# Patient Record
Sex: MALE | Race: WHITE | HISPANIC OR LATINO | Employment: UNEMPLOYED | URBAN - METROPOLITAN AREA
[De-identification: names, ages, dates, MRNs, and addresses within clinical notes are randomized per-mention and may not be internally consistent; named-entity substitution may affect disease eponyms.]

---

## 2017-04-25 PROBLEM — Z72.0 TOBACCO ABUSE: Status: ACTIVE | Noted: 2017-04-25

## 2017-04-25 PROBLEM — I25.10 CORONARY ARTERY DISEASE INVOLVING NATIVE HEART: Status: ACTIVE | Noted: 2017-04-25

## 2017-06-02 PROBLEM — R06.09 DOE (DYSPNEA ON EXERTION): Status: ACTIVE | Noted: 2017-06-02

## 2017-06-02 PROBLEM — R97.20 ELEVATED PSA: Status: ACTIVE | Noted: 2017-06-02

## 2017-06-02 PROBLEM — I10 ESSENTIAL HYPERTENSION: Status: ACTIVE | Noted: 2017-06-02

## 2017-07-12 ENCOUNTER — HOSPITAL ENCOUNTER (OUTPATIENT)
Dept: SLEEP MEDICINE | Facility: HOSPITAL | Age: 67
Discharge: HOME | End: 2017-07-12
Attending: NURSE PRACTITIONER
Payer: MEDICARE

## 2017-07-12 DIAGNOSIS — G47.33 OBSTRUCTIVE SLEEP APNEA (ADULT) (PEDIATRIC): ICD-10-CM

## 2017-07-12 PROCEDURE — 95810 POLYSOM 6/> YRS 4/> PARAM: CPT

## 2017-09-19 PROBLEM — Z95.1 HX OF CABG: Status: ACTIVE | Noted: 2017-09-19

## 2017-09-19 PROBLEM — R07.9 ACUTE CHEST PAIN: Status: ACTIVE | Noted: 2017-09-19

## 2017-09-29 PROBLEM — Z91.148 NONCOMPLIANCE WITH MEDICATIONS: Status: ACTIVE | Noted: 2017-09-29

## 2018-10-23 ENCOUNTER — HOSPITAL ENCOUNTER (INPATIENT)
Facility: HOSPITAL | Age: 68
LOS: 12 days | Discharge: HOME HEALTH | DRG: 054 | End: 2018-11-04
Attending: INTERNAL MEDICINE | Admitting: INTERNAL MEDICINE
Payer: MEDICARE

## 2018-10-23 DIAGNOSIS — Z78.9 IMPAIRED MOBILITY AND ACTIVITIES OF DAILY LIVING: ICD-10-CM

## 2018-10-23 DIAGNOSIS — N50.89 TESTICLE SWELLING: ICD-10-CM

## 2018-10-23 DIAGNOSIS — Z75.8 DISCHARGE PLANNING ISSUES: ICD-10-CM

## 2018-10-23 DIAGNOSIS — I10 ESSENTIAL HYPERTENSION: ICD-10-CM

## 2018-10-23 DIAGNOSIS — R07.9 CHEST PAIN: ICD-10-CM

## 2018-10-23 DIAGNOSIS — Z74.09 IMPAIRED MOBILITY AND ACTIVITIES OF DAILY LIVING: ICD-10-CM

## 2018-10-23 DIAGNOSIS — R91.8 LUNG MASS: ICD-10-CM

## 2018-10-23 DIAGNOSIS — C34.90 NON-SMALL CELL CARCINOMA OF LUNG: ICD-10-CM

## 2018-10-23 DIAGNOSIS — C79.31 BRAIN METASTASIS: ICD-10-CM

## 2018-10-23 DIAGNOSIS — R53.81 DEBILITY: Primary | ICD-10-CM

## 2018-10-23 DIAGNOSIS — G93.6 VASOGENIC CEREBRAL EDEMA: ICD-10-CM

## 2018-10-23 DIAGNOSIS — E87.5 HYPERKALEMIA: ICD-10-CM

## 2018-10-23 LAB — POCT GLUCOSE: 141 MG/DL (ref 70–110)

## 2018-10-23 PROCEDURE — 11000001 HC ACUTE MED/SURG PRIVATE ROOM

## 2018-10-23 PROCEDURE — 25000003 PHARM REV CODE 250: Performed by: HOSPITALIST

## 2018-10-23 RX ORDER — HYDRALAZINE HYDROCHLORIDE 25 MG/1
25 TABLET, FILM COATED ORAL EVERY 8 HOURS PRN
Status: DISCONTINUED | OUTPATIENT
Start: 2018-10-23 | End: 2018-11-04 | Stop reason: HOSPADM

## 2018-10-23 RX ORDER — CARVEDILOL 12.5 MG/1
12.5 TABLET ORAL 2 TIMES DAILY
Status: DISCONTINUED | OUTPATIENT
Start: 2018-10-23 | End: 2018-10-26

## 2018-10-23 RX ADMIN — CARVEDILOL 12.5 MG: 12.5 TABLET, FILM COATED ORAL at 10:10

## 2018-10-23 NOTE — LETTER
October 26, 2018                 [facility]  [address]  [place]  [address]  Phone: [phone]  Fax: [phone] October 26, 2018     Maria L Medina            To Whom It May Concern:    Ms. Maria L Medina missed work/school on  10/26/2018 being at bedside as a family member. She may return to school on 10/27/2018 .  If you have any questions or concerns, please don't hesitate to call [facility] office at [phone].      Sincerely,        Deborah Pham MD  Internal Medicine      Department of [facility]

## 2018-10-23 NOTE — PLAN OF CARE
Please call extension [phone] (if nobody answers, this will flip to a beeper, so put in your call back number) upon patient arrival to floor for Hospital Medicine admit team assignment and for additional admit orders for the patient.  Do not page the attending, staff physician associated with the patient on arrival (may not be in-house at the time of arrival).  Rather, always call [phone] to reach the triage physician for orders and team assignment.        Acceptance Note     Patients name:  Lorenzo Rai, MRN: [id number]     Transferring Physician or Mid-Level provider/Clinic giving report:   Dr. Lorenzo Shanks at [facility] ED    Accepting Physician for admission to hospital:   MANDEEP Rogers MD     Date of acceptance:  10/23/18    Review of patient's allergies indicates:  No Known Allergies    Past Medical History:   Diagnosis Date    Hypertension     PSA elevation     Vitamin D insufficiency         Reason for transfer:  Possible lung malignancy with brain mets     Report from Physician/Mid-Level Provider:  Mr. Tran is a 66yo man with a long history of smoking cigars.  He is brought to the ED by his family due to progressive altered mental status.  He was noted to have very faint right facial droop and right leg weakness.  CT head in the ED along with CXR revealed high likelihood of cancer with brain met.  He was given Decadron x 1 at 10mg.  He has had no seizure activity noted and the sending MD feels he is stable for [facility] bed.    VS: Temp:  [97.4 °F (36.3 °C)] 97.4 °F (36.3 °C)  Pulse:  [88-98] 88  Resp:  [16] 16  SpO2:  [100 %] 100 %  BP: (165-171)/(76-88) 165/76    Labs:  Recent Results (from the past 24 hour(s))   CBC auto differential    Collection Time: 10/23/18  2:51 PM   Result Value Ref Range    WBC 11.67 3.90 - 12.70 K/uL    RBC 4.66 4.60 - 6.20 M/uL    Hemoglobin 12.6 (L) 14.0 - 18.0 g/dL    Hematocrit 40.8 40.0 - 54.0 %    MCV 88 82 - 98 fL    MCH 27.0 27.0 - 31.0 pg    MCHC 30.9 (L)  32.0 - 36.0 g/dL    RDW 13.6 11.5 - 14.5 %    Platelets 236 150 - 350 K/uL    MPV 10.3 9.2 - 12.9 fL    Gran # (ANC) 8.8 (H) 1.8 - 7.7 K/uL    Lymph # 1.0 1.0 - 4.8 K/uL    Mono # 1.0 0.3 - 1.0 K/uL    Eos # 0.7 (H) 0.0 - 0.5 K/uL    Baso # 0.06 0.00 - 0.20 K/uL    nRBC 0 0 /100 WBC    Gran% 76.2 (H) 38.0 - 73.0 %    Lymph% 8.8 (L) 18.0 - 48.0 %    Mono% 8.3 4.0 - 15.0 %    Eosinophil% 6.2 0.0 - 8.0 %    Basophil% 0.5 0.0 - 1.9 %    Differential Method Automated    Comprehensive metabolic panel    Collection Time: 10/23/18  2:51 PM   Result Value Ref Range    Sodium 138 136 - 145 mmol/L    Potassium 3.6 3.5 - 5.1 mmol/L    Chloride 104 95 - 110 mmol/L    CO2 20 (L) 23 - 29 mmol/L    Glucose 97 70 - 110 mg/dL    BUN, Bld 9 8 - 23 mg/dL    Creatinine 0.8 0.5 - 1.4 mg/dL    Calcium 9.1 8.7 - 10.5 mg/dL    Total Protein 7.9 6.0 - 8.4 g/dL    Albumin 2.5 (L) 3.5 - 5.2 g/dL    Total Bilirubin 0.5 0.1 - 1.0 mg/dL    Alkaline Phosphatase 74 55 - 135 U/L    AST 11 10 - 40 U/L    ALT 9 (L) 10 - 44 U/L    Anion Gap 14 8 - 16 mmol/L    eGFR if African American >60.0 >60 mL/min/1.73 m^2    eGFR if non African American >60.0 >60 mL/min/1.73 m^2   Ethanol    Collection Time: 10/23/18  2:51 PM   Result Value Ref Range    Alcohol, Medical, Serum <10 <10 mg/dL   Urinalysis, Reflex to Urine Culture Urine, Clean Catch    Collection Time: 10/23/18  4:21 PM   Result Value Ref Range    Specimen UA Urine, Clean Catch     Color, UA Yellow Yellow, Straw, Padmini    Appearance, UA Clear Clear    pH, UA 7.0 5.0 - 8.0    Specific Gravity, UA 1.015 1.005 - 1.030    Protein, UA Negative Negative    Glucose, UA Negative Negative    Ketones, UA Trace (A) Negative    Bilirubin (UA) Negative Negative    Occult Blood UA 1+ (A) Negative    Nitrite, UA Negative Negative    Urobilinogen, UA >=8.0 (A) <2.0 EU/dL    Leukocytes, UA Negative Negative   Drug screen panel, emergency    Collection Time: 10/23/18  4:21 PM   Result Value Ref Range     Benzodiazepines Negative     Methadone metabolites Negative     Cocaine (Metab.) Negative     Opiate Scrn, Ur Negative     Barbiturate Screen, Ur Negative     Amphetamine Screen, Ur Negative     THC Negative     Phencyclidine Negative     Creatinine, Random Ur 90.4 23.0 - 375.0 mg/dL    Toxicology Information SEE COMMENT    Urinalysis Microscopic    Collection Time: 10/23/18  4:21 PM   Result Value Ref Range    RBC, UA 18 (H) 0 - 4 /hpf    WBC, UA 1 0 - 5 /hpf    Bacteria, UA Rare None-Occ /hpf    Squam Epithel, UA 0 /hpf    Amorphous, UA Rare None-Moderate    Microscopic Comment SEE COMMENT        Radiograph:  XR CHEST AP PORTABLE    CLINICAL HISTORY:  Cough    COMPARISON:  Most recent is single AP portable view chest 09/19/2017.    FINDINGS:  Heart size is normal with prior surgical changes including sternal wires again noted.    Now evident is masslike consolidation left hilar/perihilar region as well as masslike consolidation question right infrahilar region.  CT chest to further assess.    Mild prominence of interstitial markings noted and may be on a chronic basis.  No effusion appreciated.      Impression       Masslike consolidation left hilum and perihilar region not similarly seen on comparison CT.  Masslike consolidation also questioned right infrahilar region.  CT chest to further assess.      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  Superimposed extra-axial cyst at the medial right parietooccipital region also noted.  Dysplastic appearance of brain parenchyma  posterior right frontal region which may in part reflect heterotopic gray matter.    Vasogenic edema left frontal and anterior parietal white matter with subtle mildly hyperdense nodular lesion questioned adjacent posteromedial left frontal cortex measuring approximately 1 cm.  Combination of findings raises concern for primary or secondary neoplasm.      Impression       Vasogenic edema left frontal and anterior left parietal region with question of approximately 1 cm nodular lesion posteromedial left frontal lobe the combination of findings concerning for neoplasm either primary or secondary although likely metastatic focus given findings on chest x-ray.  Follow-up MRI brain without and with contrast can be obtained to better assess.    Underlying developmental abnormality of brain including what appears to represent changes agenesis of corpus callosum, colpocephaly, extra-axial cyst medial right parietooccipital region and underlying dysplastic appearance of brain posterior right frontal region marginating the ventricle.      Electronically signed by: Chanell Grimes MD  Date: 10/23/2018     To Do List upon arrival:    1) Consult Pulmonary  2) Consult Neurosurgery  3) Metastatic cancer work up with CT chest, abdomen and pelvis  4) MRI brain with and without contrast  5) Patient got Decadron 10mg iv x 1 in the ED - continue this +/- Empiric Keppra after discussion with NSG

## 2018-10-24 PROBLEM — R91.8 MASS OF LEFT LUNG: Status: ACTIVE | Noted: 2018-10-24

## 2018-10-24 PROBLEM — G93.6 VASOGENIC CEREBRAL EDEMA: Status: ACTIVE | Noted: 2018-10-24

## 2018-10-24 LAB
ALBUMIN SERPL BCP-MCNC: 2.3 G/DL
ALP SERPL-CCNC: 72 U/L
ALT SERPL W/O P-5'-P-CCNC: 7 U/L
ANION GAP SERPL CALC-SCNC: 10 MMOL/L
AST SERPL-CCNC: 6 U/L
BASOPHILS # BLD AUTO: 0.02 K/UL
BASOPHILS NFR BLD: 0.3 %
BILIRUB SERPL-MCNC: 0.4 MG/DL
BILIRUB UR QL STRIP: NEGATIVE
BUN SERPL-MCNC: 12 MG/DL
CALCIUM SERPL-MCNC: 8.8 MG/DL
CHLORIDE SERPL-SCNC: 106 MMOL/L
CLARITY UR REFRACT.AUTO: CLEAR
CO2 SERPL-SCNC: 21 MMOL/L
COLOR UR AUTO: YELLOW
CREAT SERPL-MCNC: 1 MG/DL
DIFFERENTIAL METHOD: ABNORMAL
EOSINOPHIL # BLD AUTO: 0 K/UL
EOSINOPHIL NFR BLD: 0.4 %
ERYTHROCYTE [DISTWIDTH] IN BLOOD BY AUTOMATED COUNT: 13.5 %
EST. GFR  (AFRICAN AMERICAN): >60 ML/MIN/1.73 M^2
EST. GFR  (NON AFRICAN AMERICAN): >60 ML/MIN/1.73 M^2
ESTIMATED AVG GLUCOSE: 105 MG/DL
GLUCOSE SERPL-MCNC: 120 MG/DL
GLUCOSE UR QL STRIP: NEGATIVE
HBA1C MFR BLD HPLC: 5.3 %
HCT VFR BLD AUTO: 39.8 %
HGB BLD-MCNC: 12.5 G/DL
HGB UR QL STRIP: NEGATIVE
IMM GRANULOCYTES # BLD AUTO: 0.03 K/UL
IMM GRANULOCYTES NFR BLD AUTO: 0.4 %
KETONES UR QL STRIP: ABNORMAL
LEUKOCYTE ESTERASE UR QL STRIP: NEGATIVE
LYMPHOCYTES # BLD AUTO: 1.2 K/UL
LYMPHOCYTES NFR BLD: 15.4 %
MAGNESIUM SERPL-MCNC: 2.1 MG/DL
MCH RBC QN AUTO: 26.9 PG
MCHC RBC AUTO-ENTMCNC: 31.4 G/DL
MCV RBC AUTO: 86 FL
MONOCYTES # BLD AUTO: 0.6 K/UL
MONOCYTES NFR BLD: 7.4 %
NEUTROPHILS # BLD AUTO: 5.8 K/UL
NEUTROPHILS NFR BLD: 76.1 %
NITRITE UR QL STRIP: NEGATIVE
NRBC BLD-RTO: 0 /100 WBC
PH UR STRIP: 6 [PH] (ref 5–8)
PHOSPHATE SERPL-MCNC: 3.4 MG/DL
PLATELET # BLD AUTO: 276 K/UL
PMV BLD AUTO: 10.4 FL
POTASSIUM SERPL-SCNC: 3.8 MMOL/L
PROT SERPL-MCNC: 7.2 G/DL
PROT UR QL STRIP: NEGATIVE
RBC # BLD AUTO: 4.65 M/UL
SODIUM SERPL-SCNC: 137 MMOL/L
SP GR UR STRIP: 1 (ref 1–1.03)
TROPONIN I SERPL DL<=0.01 NG/ML-MCNC: 0.01 NG/ML
URN SPEC COLLECT METH UR: ABNORMAL
WBC # BLD AUTO: 7.55 K/UL

## 2018-10-24 PROCEDURE — 25000003 PHARM REV CODE 250: Performed by: HOSPITALIST

## 2018-10-24 PROCEDURE — 11000001 HC ACUTE MED/SURG PRIVATE ROOM

## 2018-10-24 PROCEDURE — A9585 GADOBUTROL INJECTION: HCPCS | Performed by: HOSPITALIST

## 2018-10-24 PROCEDURE — 63600175 PHARM REV CODE 636 W HCPCS: Performed by: STUDENT IN AN ORGANIZED HEALTH CARE EDUCATION/TRAINING PROGRAM

## 2018-10-24 PROCEDURE — 25500020 PHARM REV CODE 255: Performed by: STUDENT IN AN ORGANIZED HEALTH CARE EDUCATION/TRAINING PROGRAM

## 2018-10-24 PROCEDURE — 83735 ASSAY OF MAGNESIUM: CPT

## 2018-10-24 PROCEDURE — 85025 COMPLETE CBC W/AUTO DIFF WBC: CPT

## 2018-10-24 PROCEDURE — 84484 ASSAY OF TROPONIN QUANT: CPT

## 2018-10-24 PROCEDURE — 81003 URINALYSIS AUTO W/O SCOPE: CPT

## 2018-10-24 PROCEDURE — 99223 1ST HOSP IP/OBS HIGH 75: CPT | Mod: GC,,, | Performed by: INTERNAL MEDICINE

## 2018-10-24 PROCEDURE — 84100 ASSAY OF PHOSPHORUS: CPT

## 2018-10-24 PROCEDURE — 25500020 PHARM REV CODE 255: Performed by: HOSPITALIST

## 2018-10-24 PROCEDURE — 99223 1ST HOSP IP/OBS HIGH 75: CPT | Mod: GC,,, | Performed by: HOSPITALIST

## 2018-10-24 PROCEDURE — 83036 HEMOGLOBIN GLYCOSYLATED A1C: CPT

## 2018-10-24 PROCEDURE — 36415 COLL VENOUS BLD VENIPUNCTURE: CPT

## 2018-10-24 PROCEDURE — 25000003 PHARM REV CODE 250: Performed by: STUDENT IN AN ORGANIZED HEALTH CARE EDUCATION/TRAINING PROGRAM

## 2018-10-24 PROCEDURE — 51798 US URINE CAPACITY MEASURE: CPT

## 2018-10-24 PROCEDURE — 93005 ELECTROCARDIOGRAM TRACING: CPT

## 2018-10-24 PROCEDURE — 80053 COMPREHEN METABOLIC PANEL: CPT

## 2018-10-24 PROCEDURE — 93010 ELECTROCARDIOGRAM REPORT: CPT | Mod: ,,, | Performed by: INTERNAL MEDICINE

## 2018-10-24 PROCEDURE — 25000242 PHARM REV CODE 250 ALT 637 W/ HCPCS: Performed by: STUDENT IN AN ORGANIZED HEALTH CARE EDUCATION/TRAINING PROGRAM

## 2018-10-24 RX ORDER — TAMSULOSIN HYDROCHLORIDE 0.4 MG/1
0.4 CAPSULE ORAL DAILY
Status: DISCONTINUED | OUTPATIENT
Start: 2018-10-24 | End: 2018-11-04 | Stop reason: HOSPADM

## 2018-10-24 RX ORDER — IBUPROFEN 200 MG
1 TABLET ORAL DAILY
Status: DISCONTINUED | OUTPATIENT
Start: 2018-10-25 | End: 2018-11-04 | Stop reason: HOSPADM

## 2018-10-24 RX ORDER — IBUPROFEN 200 MG
24 TABLET ORAL
Status: DISCONTINUED | OUTPATIENT
Start: 2018-10-24 | End: 2018-11-04 | Stop reason: HOSPADM

## 2018-10-24 RX ORDER — PANTOPRAZOLE SODIUM 40 MG/1
40 TABLET, DELAYED RELEASE ORAL DAILY
Status: DISCONTINUED | OUTPATIENT
Start: 2018-10-24 | End: 2018-11-04 | Stop reason: HOSPADM

## 2018-10-24 RX ORDER — NAPROXEN SODIUM 220 MG/1
81 TABLET, FILM COATED ORAL DAILY
Status: DISCONTINUED | OUTPATIENT
Start: 2018-10-24 | End: 2018-10-24

## 2018-10-24 RX ORDER — FINASTERIDE 5 MG/1
5 TABLET, FILM COATED ORAL DAILY
Status: DISCONTINUED | OUTPATIENT
Start: 2018-10-24 | End: 2018-11-04 | Stop reason: HOSPADM

## 2018-10-24 RX ORDER — HYDROCODONE BITARTRATE AND ACETAMINOPHEN 5; 325 MG/1; MG/1
1 TABLET ORAL EVERY 8 HOURS PRN
Status: DISCONTINUED | OUTPATIENT
Start: 2018-10-24 | End: 2018-11-04 | Stop reason: HOSPADM

## 2018-10-24 RX ORDER — IBUPROFEN 200 MG
16 TABLET ORAL
Status: DISCONTINUED | OUTPATIENT
Start: 2018-10-24 | End: 2018-11-04 | Stop reason: HOSPADM

## 2018-10-24 RX ORDER — DEXAMETHASONE SODIUM PHOSPHATE 4 MG/ML
8 INJECTION, SOLUTION INTRA-ARTICULAR; INTRALESIONAL; INTRAMUSCULAR; INTRAVENOUS; SOFT TISSUE EVERY 8 HOURS
Status: DISCONTINUED | OUTPATIENT
Start: 2018-10-24 | End: 2018-10-25

## 2018-10-24 RX ORDER — ATORVASTATIN CALCIUM 20 MG/1
80 TABLET, FILM COATED ORAL DAILY
Status: DISCONTINUED | OUTPATIENT
Start: 2018-10-24 | End: 2018-11-04 | Stop reason: HOSPADM

## 2018-10-24 RX ORDER — TIOTROPIUM BROMIDE 18 UG/1
1 CAPSULE ORAL; RESPIRATORY (INHALATION) DAILY
Status: DISCONTINUED | OUTPATIENT
Start: 2018-10-24 | End: 2018-11-04 | Stop reason: HOSPADM

## 2018-10-24 RX ORDER — GADOBUTROL 604.72 MG/ML
8 INJECTION INTRAVENOUS
Status: COMPLETED | OUTPATIENT
Start: 2018-10-24 | End: 2018-10-24

## 2018-10-24 RX ORDER — GLUCAGON 1 MG
1 KIT INJECTION
Status: DISCONTINUED | OUTPATIENT
Start: 2018-10-24 | End: 2018-11-04 | Stop reason: HOSPADM

## 2018-10-24 RX ORDER — DEXAMETHASONE SODIUM PHOSPHATE 4 MG/ML
8 INJECTION, SOLUTION INTRA-ARTICULAR; INTRALESIONAL; INTRAMUSCULAR; INTRAVENOUS; SOFT TISSUE EVERY 8 HOURS
Status: DISCONTINUED | OUTPATIENT
Start: 2018-10-24 | End: 2018-10-24

## 2018-10-24 RX ORDER — LEVETIRACETAM 500 MG/1
500 TABLET ORAL 2 TIMES DAILY
Status: DISCONTINUED | OUTPATIENT
Start: 2018-10-24 | End: 2018-11-04 | Stop reason: HOSPADM

## 2018-10-24 RX ORDER — SODIUM CHLORIDE 0.9 % (FLUSH) 0.9 %
5 SYRINGE (ML) INJECTION
Status: DISCONTINUED | OUTPATIENT
Start: 2018-10-24 | End: 2018-11-04 | Stop reason: HOSPADM

## 2018-10-24 RX ADMIN — IOHEXOL 15 ML: 350 INJECTION, SOLUTION INTRAVENOUS at 10:10

## 2018-10-24 RX ADMIN — IOHEXOL 15 ML: 350 INJECTION, SOLUTION INTRAVENOUS at 03:10

## 2018-10-24 RX ADMIN — GADOBUTROL 8 ML: 604.72 INJECTION INTRAVENOUS at 07:10

## 2018-10-24 RX ADMIN — IOHEXOL 15 ML: 350 INJECTION, SOLUTION INTRAVENOUS at 09:10

## 2018-10-24 RX ADMIN — CARVEDILOL 12.5 MG: 12.5 TABLET, FILM COATED ORAL at 09:10

## 2018-10-24 RX ADMIN — DEXAMETHASONE SODIUM PHOSPHATE 8 MG: 4 INJECTION, SOLUTION INTRAMUSCULAR; INTRAVENOUS at 09:10

## 2018-10-24 RX ADMIN — TIOTROPIUM BROMIDE 18 MCG: 18 CAPSULE ORAL; RESPIRATORY (INHALATION) at 09:10

## 2018-10-24 RX ADMIN — CARVEDILOL 12.5 MG: 12.5 TABLET, FILM COATED ORAL at 08:10

## 2018-10-24 RX ADMIN — IOHEXOL 15 ML: 350 INJECTION, SOLUTION INTRAVENOUS at 08:10

## 2018-10-24 RX ADMIN — PANTOPRAZOLE SODIUM 40 MG: 40 TABLET, DELAYED RELEASE ORAL at 11:10

## 2018-10-24 RX ADMIN — DEXAMETHASONE SODIUM PHOSPHATE 8 MG: 4 INJECTION, SOLUTION INTRAMUSCULAR; INTRAVENOUS at 06:10

## 2018-10-24 RX ADMIN — ATORVASTATIN CALCIUM 80 MG: 20 TABLET, FILM COATED ORAL at 09:10

## 2018-10-24 RX ADMIN — FINASTERIDE 5 MG: 5 TABLET, FILM COATED ORAL at 09:10

## 2018-10-24 RX ADMIN — DEXAMETHASONE SODIUM PHOSPHATE 8 MG: 4 INJECTION, SOLUTION INTRAMUSCULAR; INTRAVENOUS at 01:10

## 2018-10-24 RX ADMIN — HYDROCODONE BITARTRATE AND ACETAMINOPHEN 1 TABLET: 5; 325 TABLET ORAL at 02:10

## 2018-10-24 RX ADMIN — LEVETIRACETAM 500 MG: 500 TABLET ORAL at 08:10

## 2018-10-24 RX ADMIN — TAMSULOSIN HYDROCHLORIDE 0.4 MG: 0.4 CAPSULE ORAL at 09:10

## 2018-10-24 RX ADMIN — IOHEXOL 15 ML: 350 INJECTION, SOLUTION INTRAVENOUS at 02:10

## 2018-10-24 RX ADMIN — HYDROCODONE BITARTRATE AND ACETAMINOPHEN 1 TABLET: 5; 325 TABLET ORAL at 06:10

## 2018-10-24 RX ADMIN — LEVETIRACETAM 500 MG: 500 TABLET ORAL at 09:10

## 2018-10-24 NOTE — ASSESSMENT & PLAN NOTE
- hx of elevated PSA  - nursing reports of urinary incontinence, dribbling, weak stream  - resume finasteride, tamsulosin

## 2018-10-24 NOTE — NURSING
Patient ate part of his lunch.  Patient reeducated to not eat or drink anything before CT scan.  MD notified, CT rescheduled for 430PM.  Will continue to monitor.

## 2018-10-24 NOTE — ASSESSMENT & PLAN NOTE
66 yo M w/ PMH of HTN, CAD s/p CABG, carotid artery disease s/p stenting, and elevated PSA who presents as transfer for evaluation of brain lesion w/ newly found mass-like consolidations on CXR. Concern for primary lung lesion w/ significant smoking history and brain metastasis w/ vasogenic edema. Mild expressive aphasia, R>L weakness, difficulty w/ mobility. Hypertensive concerning for Cushing reflex secondary to cerebral edema. HR wnl.   - MRI brain stealth w/ contrast pending  - Consult NSG after MRI completed  - IV decadron 8 mg q8   - keppra 500 mg BID  - neurochecks q4  - CT chest/abdomen/pelvis w/ contrast pending to evaluate for primary source  - Consult Pulmonology to eval for bronch biopsy

## 2018-10-24 NOTE — CONSULTS
[facility]  Pulmonology  Consult Note    Patient Name: Lorenzo Tran Sr.  MRN: [id number]  Admission Date: 10/23/2018  Hospital Length of Stay: 1 days  Code Status: Full Code  Attending Physician: Berenice Shipman MD  Primary Care Provider: JONATHAN Dutta   Principal Problem: Brain metastasis    Consults  Subjective:     HPI:  Mr. Lorenzo Rai is a pleasant 67 year old gentleman with PMHx of HTN, CAD (s/p CABG >10 years ago), carotid artery disease (s/p stenting >10 years ago), peripheral vascular disease, and BPH who presented to [facility] on 10/23 as transfer from [facility] for chief complaint of altered mental status and generalized weakness acute in onset yesterday morning. Patient reports that he was ambulating in his home with his cane yesterday morning when he began to feel very weak and fell to his knees while struggling to get up for a few minutes. Later that morning around 10AM he reports that he was sitting in the bathtub and unable to lift himself from the tub without assistance, waiting 4 hours before family members noted this and called EMS.     In addition to his generalized fatigue he reports weakness worst in his RLE that is chronic following a work related injury 30 years ago as well as RUE weakness. 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 He has a long smoking history reporting that he smoked 1 pack per day of cigarettes for over 30 years until 2 years ago when he began smoking 1 pack of cigars per day. He denies alcohol or other recreational drug history. He endorses a chronic cough at baseline occasionally productive of sputum as well as 1 episode of small volume hemoptysis 2-3 months ago as well as shortness of breath with activity and intermittent wheezing. In the ED he was noted to have a right sided facial droop and received 10mg  decadron prior to transfer. He had an AP chest xray that was notable for masslike consolidations in the left hilar and perihilar regions of the lung as well as the right infrahilar region. A CT head was notable for vasogenic edema in the L frontal and anterior L parietal region with a 1cm nodular lesion in the posteromedial L frontal lobe concerning for a metastatic lesion. Pulmonary has been consulted for a possible EBUS/bronch with biopsy to evaluate for primary malignancy. Patient denies anticoagulant use and takes aspirin 81mg daily.    Past Medical History:   Diagnosis Date    Hypertension     PSA elevation     Vitamin D insufficiency        Past Surgical History:   Procedure Laterality Date    CARDIAC SURGERY      had stents put in neck    CAROTID STENT Bilateral     CORONARY ARTERY BYPASS GRAFT      right heel      right hip      right leg       stents put in both legs         Review of patient's allergies indicates:  No Known Allergies    Family History     Problem Relation (Age of Onset)    Cancer Father, Brother    Diabetes Brother    No Known Problems Mother        Tobacco Use    Smoking status: Current Every Day Smoker     Types: Cigars    Smokeless tobacco: Never Used   Substance and Sexual Activity    Alcohol use: No    Drug use: No    Sexual activity: Not on file         Review of Systems   Constitutional: Positive for activity change and fatigue. Negative for appetite change, chills and fever.   HENT: Positive for sinus pressure. Negative for sore throat and trouble swallowing.    Respiratory: Positive for cough, chest tightness, shortness of breath and wheezing.    Cardiovascular: Positive for chest pain and leg swelling (R>L). Negative for palpitations.   Gastrointestinal: Negative for abdominal pain, constipation and diarrhea.   Genitourinary: Positive for difficulty urinating and scrotal swelling (non-painful). Negative for frequency, hematuria, penile pain, penile swelling,  testicular pain and urgency.   Musculoskeletal: Positive for gait problem and myalgias.   Skin: Positive for color change. Negative for pallor and wound.   Neurological: Positive for facial asymmetry, speech difficulty and numbness (RLE). Negative for seizures.   Psychiatric/Behavioral: Positive for sleep disturbance. Negative for confusion and decreased concentration.   All other systems reviewed and are negative.    Objective:     Vital Signs (Most Recent):  Temp: 98.1 °F (36.7 °C) (10/24/18 1219)  Pulse: 63 (10/24/18 1219)  Resp: 15 (10/24/18 1219)  BP: (!) 148/79 (10/24/18 1219)  SpO2: (!) 92 % (10/24/18 1219) Vital Signs (24h Range):  Temp:  [97.5 °F (36.4 °C)-98.4 °F (36.9 °C)] 98.1 °F (36.7 °C)  Pulse:  [] 63  Resp:  [14-18] 15  SpO2:  [90 %-100 %] 92 %  BP: (126-212)/() 148/79     Weight: 74.8 kg (165 lb)  Body mass index is 25.84 kg/m².      Intake/Output Summary (Last 24 hours) at 10/24/2018 1525  Last data filed at 10/24/2018 1331  Gross per 24 hour   Intake 740 ml   Output 1150 ml   Net -410 ml       Physical Exam   Constitutional: He is oriented to person, place, and time. He appears well-developed and well-nourished. No distress. He is not intubated.   HENT:   Head: Normocephalic and atraumatic.   Mouth/Throat: Oropharyngeal exudate (thin white lingual exudate) present.   Eyes: EOM are normal. Pupils are equal, round, and reactive to light.   Neck: Normal range of motion. Neck supple.   Cardiovascular: Normal rate, regular rhythm and intact distal pulses.   Pulmonary/Chest: Effort normal. No accessory muscle usage. No apnea, no tachypnea and no bradypnea. He is not intubated. No respiratory distress. He has wheezes in the right upper field, the right middle field, the left upper field and the left middle field. He has no rales.   Abdominal: Soft. He exhibits no distension. There is no tenderness. There is no guarding.   Genitourinary:   Genitourinary Comments: Scrotal swelling  Condom  catheter in place draining yellow urine   Musculoskeletal: Normal range of motion. He exhibits no edema.   Lymphadenopathy:     He has no cervical adenopathy.   Neurological: He is alert and oriented to person, place, and time. No cranial nerve deficit or sensory deficit. GCS eye subscore is 4. GCS verbal subscore is 5. GCS motor subscore is 6.   There is no facial droop noted  CN II-VIII, XI, XII intact  There is a mild expressive aphasia with delayed verbal responses   Skin: Skin is warm and dry. He is not diaphoretic.   Skin mottling in BLE feet  Nail clubbing and onychomycosis present   Psychiatric: He has a normal mood and affect. His behavior is normal. Thought content normal. His speech is delayed. Cognition and memory are impaired.   Nursing note and vitals reviewed.           Lines/Drains/Airways     Peripheral Intravenous Line                 Peripheral IV - Single Lumen 10/23/18 2032 Right Wrist less than 1 day                Significant Labs:    CBC/Anemia Profile:  Recent Labs   Lab 10/23/18  1451 10/24/18  0427   WBC 11.67 7.55   HGB 12.6* 12.5*   HCT 40.8 39.8*    276   MCV 88 86   RDW 13.6 13.5        Chemistries:  Recent Labs   Lab 10/23/18  1451 10/24/18  0427    137   K 3.6 3.8    106   CO2 20* 21*   BUN 9 12   CREATININE 0.8 1.0   CALCIUM 9.1 8.8   ALBUMIN 2.5* 2.3*   PROT 7.9 7.2   BILITOT 0.5 0.4   ALKPHOS 74 72   ALT 9* 7*   AST 11 6*   MG  --  2.1   PHOS  --  3.4       All pertinent labs within the past 24 hours have been reviewed.    Significant Imaging:   X-Ray Chest AP 10/23/18:  Masslike consolidation left hilum and perihilar region not similarly seen on comparison CT.  Masslike consolidation also questioned right infrahilar region.  CT chest to further assess.    CT Head WO:  Vasogenic edema left frontal and anterior left parietal region with question of approximately 1 cm nodular lesion posteromedial left frontal lobe the combination of findings concerning for  neoplasm either primary or secondary although likely metastatic focus given findings on chest x-ray.  Follow-up MRI brain without and with contrast can be obtained to better assess.    Underlying developmental abnormality of brain including what appears to represent changes agenesis of corpus callosum, colpocephaly, extra-axial cyst medial right parietooccipital region and underlying dysplastic appearance of brain posterior right frontal region marginating the ventricle.    Assessment/Plan:     Mass of left lung    - Noted to have bilateral lung masses on CXR, not evident on prior imaging from 9/17, concerning for primary lung malignancy in this patient with prolonged reported smoking history approx 32 pack years. He presents with altered mental status and right sided generalized weakness, found to have 1cm mass in left frontal lobe with vasogenic edema and mass effect concerning for possible mets.   - Bronch with biopsy pending results CT chest abdomen pelvis to further characterize mass lesions and assess for lower risk biopsy location.  - Patient not currently on anticoagulation, on aspirin 81 daily  - Will discuss with primary team if neurosurgery plan to intervene, tentatively plan for bronch tomorrow if no neurosurgery intervention and pending results of CT neck/chest. Patient ate portion of his lunchtime meal today so CT was postponed until 1630.  - Will need to be NPO at midnight.       MCCRAY (dyspnea on exertion)    - O2 sats since admission % on room air. Complains of worsening dyspnea with ambulation or exertion. Likely sequela of undiagnosed COPD with associated smoking history.  - Agree with beginning tiotropium.  - Recommend outpatient follow up for PFTs  - Encourage smoking cessation           Thank you for your consult. I will follow-up with patient. Please contact us if you have any additional questions.     Raymundo Spence MD PGY-1  Pulmonology  [facility]
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
## 2018-10-24 NOTE — ASSESSMENT & PLAN NOTE
- Described as sharp, radiating from his R chest across to the L, non-reproducible  - Reports that this is a chronic occurrence, comes and goes   - Concern for MSK vs ACS vs PE vs likely cancerous masses on CXR  - Obtained EKG, negative for STEMI; troponin pending  - resumed aspirin 81 mg

## 2018-10-24 NOTE — PLAN OF CARE
Pt says he lives w wife  Daughter- nikole-fran schulte if she needs to speak w me  Pt says he has 4 canes  Denies other dme  gen weakness  Pulm consulted  NS consulted?bx  Dr Sierra in room to assess    No future appointments.     10/24/18 1014   Discharge Assessment   Assessment Type Discharge Planning Assessment   Confirmed/corrected address and phone number on facesheet? Yes   Assessment information obtained from? Patient   Expected Length of Stay (days) 4   Communicated expected length of stay with patient/caregiver yes   Prior to hospitilization cognitive status: Alert/Oriented   Prior to hospitalization functional status: Independent;Assistive Equipment   Current cognitive status: Alert/Oriented   Current Functional Status: Independent   Lives With spouse   Able to Return to Prior Arrangements yes   Is patient able to care for self after discharge? Unable to determine at this time (comments)   Who are your caregiver(s) and their phone number(s)? walker agustin   [phone]   Patient's perception of discharge disposition home health   Readmission Within The Last 30 Days no previous admission in last 30 days   Patient currently being followed by outpatient case management? No   Patient currently receives any other outside agency services? No   Equipment Currently Used at Home cane, straight;cane, quad   Do you have any problems affording any of your prescribed medications? No   Is the patient taking medications as prescribed? yes   Does the patient have transportation home? Yes   Transportation Available family or friend will provide   Does the patient receive services at the [facility]? No   Discharge Plan A Home Health   Discharge Plan B Skilled Nursing Facility;Other   Patient/Family In Agreement With Plan yes

## 2018-10-24 NOTE — SUBJECTIVE & OBJECTIVE
Past Medical History:   Diagnosis Date    Hypertension     PSA elevation     Vitamin D insufficiency        Past Surgical History:   Procedure Laterality Date    CARDIAC SURGERY      had stents put in neck    CAROTID STENT Bilateral     CORONARY ARTERY BYPASS GRAFT      right heel      right hip      right leg       stents put in both legs         Review of patient's allergies indicates:  No Known Allergies    Family History     Problem Relation (Age of Onset)    Cancer Father, Brother    Diabetes Brother    No Known Problems Mother        Tobacco Use    Smoking status: Current Every Day Smoker     Types: Cigars    Smokeless tobacco: Never Used   Substance and Sexual Activity    Alcohol use: No    Drug use: No    Sexual activity: Not on file         Review of Systems   Constitutional: Positive for activity change and fatigue. Negative for appetite change, chills and fever.   HENT: Positive for sinus pressure. Negative for sore throat and trouble swallowing.    Respiratory: Positive for cough, chest tightness, shortness of breath and wheezing.    Cardiovascular: Positive for chest pain and leg swelling (R>L). Negative for palpitations.   Gastrointestinal: Negative for abdominal pain, constipation and diarrhea.   Genitourinary: Positive for difficulty urinating and scrotal swelling (non-painful). Negative for frequency, hematuria, penile pain, penile swelling, testicular pain and urgency.   Musculoskeletal: Positive for gait problem and myalgias.   Skin: Positive for color change. Negative for pallor and wound.   Neurological: Positive for facial asymmetry, speech difficulty and numbness (RLE). Negative for seizures.   Psychiatric/Behavioral: Positive for sleep disturbance. Negative for confusion and decreased concentration.   All other systems reviewed and are negative.    Objective:     Vital Signs (Most Recent):  Temp: 98.1 °F (36.7 °C) (10/24/18 1219)  Pulse: 63 (10/24/18 1219)  Resp: 15 (10/24/18  1219)  BP: (!) 148/79 (10/24/18 1219)  SpO2: (!) 92 % (10/24/18 1219) Vital Signs (24h Range):  Temp:  [97.5 °F (36.4 °C)-98.4 °F (36.9 °C)] 98.1 °F (36.7 °C)  Pulse:  [] 63  Resp:  [14-18] 15  SpO2:  [90 %-100 %] 92 %  BP: (126-212)/() 148/79     Weight: 74.8 kg (165 lb)  Body mass index is 25.84 kg/m².      Intake/Output Summary (Last 24 hours) at 10/24/2018 1525  Last data filed at 10/24/2018 1331  Gross per 24 hour   Intake 740 ml   Output 1150 ml   Net -410 ml       Physical Exam   Constitutional: He is oriented to person, place, and time. He appears well-developed and well-nourished. No distress. He is not intubated.   HENT:   Head: Normocephalic and atraumatic.   Mouth/Throat: Oropharyngeal exudate (thin white lingual exudate) present.   Eyes: EOM are normal. Pupils are equal, round, and reactive to light.   Neck: Normal range of motion. Neck supple.   Cardiovascular: Normal rate, regular rhythm and intact distal pulses.   Pulmonary/Chest: Effort normal. No accessory muscle usage. No apnea, no tachypnea and no bradypnea. He is not intubated. No respiratory distress. He has wheezes in the right upper field, the right middle field, the left upper field and the left middle field. He has no rales.   Abdominal: Soft. He exhibits no distension. There is no tenderness. There is no guarding.   Genitourinary:   Genitourinary Comments: Scrotal swelling  Condom catheter in place draining yellow urine   Musculoskeletal: Normal range of motion. He exhibits no edema.   Lymphadenopathy:     He has no cervical adenopathy.   Neurological: He is alert and oriented to person, place, and time. No cranial nerve deficit or sensory deficit. GCS eye subscore is 4. GCS verbal subscore is 5. GCS motor subscore is 6.   There is no facial droop noted  CN II-VIII, XI, XII intact  There is a mild expressive aphasia with delayed verbal responses   Skin: Skin is warm and dry. He is not diaphoretic.   Skin mottling in BLE  feet  Nail clubbing and onychomycosis present   Psychiatric: He has a normal mood and affect. His behavior is normal. Thought content normal. His speech is delayed. Cognition and memory are impaired.   Nursing note and vitals reviewed.           Lines/Drains/Airways     Peripheral Intravenous Line                 Peripheral IV - Single Lumen 10/23/18 2032 Right Wrist less than 1 day                Significant Labs:    CBC/Anemia Profile:  Recent Labs   Lab 10/23/18  1451 10/24/18  0427   WBC 11.67 7.55   HGB 12.6* 12.5*   HCT 40.8 39.8*    276   MCV 88 86   RDW 13.6 13.5        Chemistries:  Recent Labs   Lab 10/23/18  1451 10/24/18  0427    137   K 3.6 3.8    106   CO2 20* 21*   BUN 9 12   CREATININE 0.8 1.0   CALCIUM 9.1 8.8   ALBUMIN 2.5* 2.3*   PROT 7.9 7.2   BILITOT 0.5 0.4   ALKPHOS 74 72   ALT 9* 7*   AST 11 6*   MG  --  2.1   PHOS  --  3.4       All pertinent labs within the past 24 hours have been reviewed.    Significant Imaging:   X-Ray Chest AP 10/23/18:  Masslike consolidation left hilum and perihilar region not similarly seen on comparison CT.  Masslike consolidation also questioned right infrahilar region.  CT chest to further assess.    CT Head WO:  Vasogenic edema left frontal and anterior left parietal region with question of approximately 1 cm nodular lesion posteromedial left frontal lobe the combination of findings concerning for neoplasm either primary or secondary although likely metastatic focus given findings on chest x-ray.  Follow-up MRI brain without and with contrast can be obtained to better assess.    Underlying developmental abnormality of brain including what appears to represent changes agenesis of corpus callosum, colpocephaly, extra-axial cyst medial right parietooccipital region and underlying dysplastic appearance of brain posterior right frontal region marginating the ventricle.

## 2018-10-24 NOTE — ASSESSMENT & PLAN NOTE
- stable  - currently normotensive  - resume coreg 12.5 mg BID  - possibly secondary to cushing reflex, will continue to monitor  - SBP <200

## 2018-10-24 NOTE — HPI
Mr. Lorenzo Rai is a pleasant 67 year old gentleman with PMHx of HTN, CAD (s/p CABG >10 years ago), carotid artery disease (s/p stenting >10 years ago), peripheral vascular disease, and BPH who presented to [facility] [place] [place] overnight on 10/23 as transfer from [facility] for chief complaint of altered mental status and generalized weakness acute in onset yesterday morning. Patient reports that he was ambulating in his home with his cane yesterday morning when he began to feel very weak and fell to his knees while struggling to get up for a few minutes. Later that morning around 10AM he reports that he was sitting in the bathtub and unable to lift himself from the tub without assistance, waiting 4 hours before family members noted this and called EMS.     In addition to his generalized fatigue he reports weakness worst in his RLE that is chronic following a work related injury 30 years ago as well as RUE weakness. 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 He has a long smoking history reporting that he smoked 1 pack per day of cigarettes for over 30 years until 2 years ago when he began smoking 1 pack of cigars per day. He denies alcohol or other recreational drug history. He endorses a chronic cough at baseline occasionally productive of sputum as well as 1 episode of small volume hemoptysis 2-3 months ago as well as shortness of breath with activity and intermittent wheezing. In the ED he was noted to have a right sided facial droop and received 10mg decadron prior to transfer. He had an AP chest xray that was notable for masslike consolidations in the left hilar and perihilar regions of the lung as well as the right infrahilar region. A CT head was notable for vasogenic edema in the L frontal and anterior L parietal region with a 1cm nodular lesion in the posteromedial L frontal lobe concerning for a  metastatic lesion. Pulmonary has been consulted for a possible EBUS/bronch with biopsy to evaluate for primary malignancy. Patient denies anticoagulant use and takes aspirin 81mg daily.

## 2018-10-24 NOTE — H&P
[facility] Hospital Medicine  History & Physical    Patient Name: Lorenzo Tran Sr.  MRN: [id number]  Admission Date: 10/23/2018  Attending Physician: Berenice Shipman MD   Primary Care Provider: Padmini Dailey, [facility] Medicine Team: [facility] 1 Doreen Dumont MD     Patient information was obtained from patient and ER records.     Subjective:     Principal Problem:Brain metastasis    Chief Complaint:   Chief Complaint   Patient presents with    Brain Tumor     Transfer; CT demonstrated edema w/ nodular lesion        HPI: 68 yo M w/ PMH of HTN, CAD s/p CABG, carotid artery disease s/p stenting, and elevated PSA who presents as transfer for evaluation of brain lesion. Patient was brought to [facility] ED by his family w/ concern of altered mental status and generalized weakness. Family was concerned that he was not acting like himself. Patient shares that yesterday he attempted to take a bath and was unable to lift himself out of the tub. He waited 4 hours until a family member called EMS. He endorses chronic RLE weakness secondary to work related incident approximately 30 years ago. He also endorses new RUE weakness. On evaluation in the ED, he was noted to have very mild R facial droop. CT head demonstrated vasogenic edema w/ 1 cm nodular lesion in the L frontal lobe. CXR was significant for mass like consolidations in the L lung. Complains of chronic cough w/ occasional sputum. Shares that he had 1 episode of hemoptysis 2-3 months ago. Patient has no previous lung history but endorses a long history of smoking cigars. Reports that he would smoke a pack of cigars a day. In the ED prior to transfer he received decadron 10mg. No seizure like activity noted and no previous seizure history. Reports shortness of breath and urinary symptoms. Denies fever, chills, chest pain, and abdominal pain.       Past Medical History:   Diagnosis Date    Hypertension     PSA elevation      Vitamin D insufficiency        Past Surgical History:   Procedure Laterality Date    CARDIAC SURGERY      had stents put in neck    CAROTID STENT Bilateral     CORONARY ARTERY BYPASS GRAFT      right heel      right hip      right leg       stents put in both legs         Review of patient's allergies indicates:  No Known Allergies    Current Facility-Administered Medications on File Prior to Encounter   Medication    [COMPLETED] dexamethasone injection 12 mg     Current Outpatient Medications on File Prior to Encounter   Medication Sig    aspirin 81 MG Chew Take 1 tablet (81 mg total) by mouth once daily.    atorvastatin (LIPITOR) 80 MG tablet Take 1 tablet (80 mg total) by mouth once daily.    carvedilol (COREG) 12.5 MG tablet Take 0.5 tablets (6.25 mg total) by mouth 2 (two) times daily with meals.    ergocalciferol (ERGOCALCIFEROL) 50,000 unit Cap Take 1 capsule (50,000 Units total) by mouth every 7 days. Take once weekly for 12 weeks.    finasteride (PROSCAR) 5 mg tablet Take 1 tablet (5 mg total) by mouth once daily.    lisinopril (PRINIVIL,ZESTRIL) 2.5 MG tablet Take 1 tablet (2.5 mg total) by mouth once daily.    nitroGLYCERIN (NITROSTAT) 0.4 MG SL tablet Place 1 tablet (0.4 mg total) under the tongue every 5 (five) minutes as needed for Chest pain.    tamsulosin (FLOMAX) 0.4 mg Cp24 Take 1 capsule (0.4 mg total) by mouth once daily.     Family History     Problem Relation (Age of Onset)    Cancer Father, Brother    Diabetes Brother    No Known Problems Mother        Tobacco Use    Smoking status: Current Every Day Smoker     Types: Cigars    Smokeless tobacco: Never Used   Substance and Sexual Activity    Alcohol use: No    Drug use: No    Sexual activity: Not on file     Review of Systems   Constitutional: Negative for chills and fever.   HENT: Positive for congestion. Negative for trouble swallowing.    Eyes: Negative for visual disturbance.   Respiratory: Positive for cough and  shortness of breath.    Cardiovascular: Positive for chest pain (From R chest across to L). Negative for leg swelling.   Gastrointestinal: Negative for abdominal pain, nausea and vomiting.   Genitourinary: Positive for dysuria and hematuria.   Skin: Negative for rash.   Neurological: Positive for weakness. Negative for dizziness, numbness and headaches.   Psychiatric/Behavioral: Positive for confusion.     Objective:     Vital Signs (Most Recent):  Temp: 98 °F (36.7 °C) (10/23/18 2232)  Pulse: 107 (10/23/18 2232)  Resp: 16 (10/23/18 2232)  BP: 134/80 (10/23/18 2357)  SpO2: (!) 94 % (10/23/18 2232) Vital Signs (24h Range):  Temp:  [97.4 °F (36.3 °C)-98 °F (36.7 °C)] 98 °F (36.7 °C)  Pulse:  [] 107  Resp:  [15-18] 16  SpO2:  [94 %-100 %] 94 %  BP: (134-212)/() 134/80        There is no height or weight on file to calculate BMI.    Physical Exam   Constitutional: He is oriented to person, place, and time. He appears well-developed and well-nourished. No distress.   HENT:   Head: Normocephalic and atraumatic.   Mouth/Throat: Oropharynx is clear and moist. No oropharyngeal exudate.   Eyes: Conjunctivae and EOM are normal.   Neck: Normal range of motion. Neck supple.   Cardiovascular: Normal rate, regular rhythm, normal heart sounds and intact distal pulses.   Pulmonary/Chest: Effort normal and breath sounds normal. No respiratory distress.   Abdominal: Soft. Bowel sounds are normal. There is no tenderness.   Musculoskeletal: Normal range of motion. He exhibits no edema.   Neurological: He is alert and oriented to person, place, and time.   AAOx3  Mild expressive aphasia  Symmetric smile; no gross CN deficits  Strength 5/5 on L, 4/5 RUE, 3/5 RLE  No sensory deficits noted   Skin: Skin is warm and dry.         CRANIAL NERVES     CN III, IV, VI   Extraocular motions are normal.        Significant Labs: All pertinent labs within the past 24 hours have been reviewed.    Significant Imaging: I have reviewed all  pertinent imaging results/findings within the past 24 hours.    Assessment/Plan:     * Brain metastasis    68 yo M w/ PMH of HTN, CAD s/p CABG, carotid artery disease s/p stenting, and elevated PSA who presents as transfer for evaluation of brain lesion w/ newly found mass-like consolidations on CXR. Concern for primary lung lesion w/ significant smoking history and brain metastasis w/ vasogenic edema. Mild expressive aphasia, R>L weakness, difficulty w/ mobility. Hypertensive concerning for Cushing reflex secondary to cerebral edema. HR wnl.   - MRI brain stealth w/ contrast pending  - Consult NSG after MRI completed  - IV decadron 8 mg q8   - keppra 500 mg BID  - neurochecks q4  - CT chest/abdomen/pelvis w/ contrast pending to evaluate for primary source  - Consult Pulmonology to eval for bronch biopsy      Chest pain    - Described as sharp, radiating from his R chest across to the L, non-reproducible  - Reports that this is a chronic occurrence, comes and goes   - Concern for MSK vs ACS vs PE vs likely cancerous masses on CXR  - Obtained EKG, negative for STEMI; troponin pending  - resumed aspirin 81 mg       Essential hypertension    - stable  - currently normotensive  - resume coreg 12.5 mg BID  - possibly secondary to cushing reflex, will continue to monitor  - SBP <200       MCCRAY (dyspnea on exertion)    - Chronic likely secondary to COPD given CXR findings and smoking history  - Not on O2 at home  - SpO2 mid 90s on RA  - duonebs PRN  - begin tiotropium daily       Elevated PSA    - hx of elevated PSA  - nursing reports of urinary incontinence, dribbling, weak stream  - resume finasteride, tamsulosin       Tobacco abuse    - acknowledges need to quit  - would benefit from smoking cessation counseling         VTE Risk Mitigation (From admission, onward)        Ordered     IP VTE HIGH RISK PATIENT  Once      10/24/18 5113             Doreen Dumont MD  Department of Hospital Medicine   [facility]  [place]

## 2018-10-24 NOTE — HPI
68 yo M w/ PMH of HTN, CAD s/p CABG, carotid artery disease s/p stenting, and elevated PSA who presents as transfer for evaluation of brain lesion. Patient was brought to [facility] ED by his family w/ concern of altered mental status and generalized weakness. Family was concerned that he was not acting like himself. Patient shares that yesterday he attempted to take a bath and was unable to lift himself out of the tub. He waited 4 hours until a family member called EMS. He endorses chronic RLE weakness secondary to work related incident approximately 30 years ago. He also endorses new RUE weakness. On evaluation in the ED, he was noted to have very mild R facial droop. CT head demonstrated vasogenic edema w/ 1 cm nodular lesion in the L frontal lobe. CXR was significant for mass like consolidations in the L lung. Complains of chronic cough w/ occasional sputum. Shares that he had 1 episode of hemoptysis 2-3 months ago. Patient has no previous lung history but endorses a long history of smoking cigars. Reports that he would smoke a pack of cigars a day. In the ED prior to transfer he received decadron 10mg. No seizure like activity noted and no previous seizure history. Reports shortness of breath and urinary symptoms. Denies fever, chills, chest pain, and abdominal pain.

## 2018-10-24 NOTE — ASSESSMENT & PLAN NOTE
- Chronic likely secondary to COPD given CXR findings and smoking history  - Not on O2 at home  - SpO2 mid 90s on RA  - duonebs PRN  - begin tiotropium daily

## 2018-10-24 NOTE — SUBJECTIVE & OBJECTIVE
Past Medical History:   Diagnosis Date    Hypertension     PSA elevation     Vitamin D insufficiency        Past Surgical History:   Procedure Laterality Date    CARDIAC SURGERY      had stents put in neck    CAROTID STENT Bilateral     CORONARY ARTERY BYPASS GRAFT      right heel      right hip      right leg       stents put in both legs         Review of patient's allergies indicates:  No Known Allergies    Current Facility-Administered Medications on File Prior to Encounter   Medication    [COMPLETED] dexamethasone injection 12 mg     Current Outpatient Medications on File Prior to Encounter   Medication Sig    aspirin 81 MG Chew Take 1 tablet (81 mg total) by mouth once daily.    atorvastatin (LIPITOR) 80 MG tablet Take 1 tablet (80 mg total) by mouth once daily.    carvedilol (COREG) 12.5 MG tablet Take 0.5 tablets (6.25 mg total) by mouth 2 (two) times daily with meals.    ergocalciferol (ERGOCALCIFEROL) 50,000 unit Cap Take 1 capsule (50,000 Units total) by mouth every 7 days. Take once weekly for 12 weeks.    finasteride (PROSCAR) 5 mg tablet Take 1 tablet (5 mg total) by mouth once daily.    lisinopril (PRINIVIL,ZESTRIL) 2.5 MG tablet Take 1 tablet (2.5 mg total) by mouth once daily.    nitroGLYCERIN (NITROSTAT) 0.4 MG SL tablet Place 1 tablet (0.4 mg total) under the tongue every 5 (five) minutes as needed for Chest pain.    tamsulosin (FLOMAX) 0.4 mg Cp24 Take 1 capsule (0.4 mg total) by mouth once daily.     Family History     Problem Relation (Age of Onset)    Cancer Father, Brother    Diabetes Brother    No Known Problems Mother        Tobacco Use    Smoking status: Current Every Day Smoker     Types: Cigars    Smokeless tobacco: Never Used   Substance and Sexual Activity    Alcohol use: No    Drug use: No    Sexual activity: Not on file     Review of Systems   Constitutional: Negative for chills and fever.   HENT: Positive for congestion. Negative for trouble swallowing.     Eyes: Negative for visual disturbance.   Respiratory: Positive for cough and shortness of breath.    Cardiovascular: Positive for chest pain (From R chest across to L). Negative for leg swelling.   Gastrointestinal: Negative for abdominal pain, nausea and vomiting.   Genitourinary: Positive for dysuria and hematuria.   Skin: Negative for rash.   Neurological: Positive for weakness. Negative for dizziness, numbness and headaches.   Psychiatric/Behavioral: Positive for confusion.     Objective:     Vital Signs (Most Recent):  Temp: 98 °F (36.7 °C) (10/23/18 2232)  Pulse: 107 (10/23/18 2232)  Resp: 16 (10/23/18 2232)  BP: 134/80 (10/23/18 2357)  SpO2: (!) 94 % (10/23/18 2232) Vital Signs (24h Range):  Temp:  [97.4 °F (36.3 °C)-98 °F (36.7 °C)] 98 °F (36.7 °C)  Pulse:  [] 107  Resp:  [15-18] 16  SpO2:  [94 %-100 %] 94 %  BP: (134-212)/() 134/80        There is no height or weight on file to calculate BMI.    Physical Exam   Constitutional: He is oriented to person, place, and time. He appears well-developed and well-nourished. No distress.   HENT:   Head: Normocephalic and atraumatic.   Mouth/Throat: Oropharynx is clear and moist. No oropharyngeal exudate.   Eyes: Conjunctivae and EOM are normal.   Neck: Normal range of motion. Neck supple.   Cardiovascular: Normal rate, regular rhythm, normal heart sounds and intact distal pulses.   Pulmonary/Chest: Effort normal and breath sounds normal. No respiratory distress.   Abdominal: Soft. Bowel sounds are normal. There is no tenderness.   Musculoskeletal: Normal range of motion. He exhibits no edema.   Neurological: He is alert and oriented to person, place, and time.   AAOx3  Mild expressive aphasia  Symmetric smile; no gross CN deficits  Strength 5/5 on L, 4/5 RUE, 3/5 RLE  No sensory deficits noted   Skin: Skin is warm and dry.         CRANIAL NERVES     CN III, IV, VI   Extraocular motions are normal.        Significant Labs: All pertinent labs within the  past 24 hours have been reviewed.    Significant Imaging: I have reviewed all pertinent imaging results/findings within the past 24 hours.

## 2018-10-24 NOTE — NURSING
Entering room family had arrived from out of town.  Patient was seen eating again.  Patient and family once again educated on the importance of nothing to eat or drink until CT scan complete.  Multiple signs placed in patient's room and on door.  CT department notified, patient will have scan in 4 hours.  MRI also contacted because hip xray is complete.  MRI states will send for patient when ready.  Will continue to monitor patient.

## 2018-10-24 NOTE — ASSESSMENT & PLAN NOTE
- O2 sats since admission % on room air. Complains of worsening dyspnea with ambulation or exertion. Likely sequela of undiagnosed COPD with associated smoking history.  - Agree with beginning tiotropium.  - Recommend outpatient follow up for PFTs  - Encourage smoking cessation

## 2018-10-24 NOTE — PLAN OF CARE
POC reviewed with pt, pt C/O dysuria and frequency, UA collected. Condom cath applied. Pt AAOx4 but states that he has problems with memory long and short term. Pt oriented to room and safety precautions maintained. Pt BP high 200/100 one time dose of coreg given with full reliefs. See flowsheet for VS.     4am pt C/O of intermitten CP that started under R armpit and radiated to L armpit 10/10 burning pain. Pt states CP happened a few days ago. Troponin and EKG ordered. Will continue to monitor.

## 2018-10-24 NOTE — CONSULTS
Consult received. Please see full consult note.     Alex De Jesus MD  Pulm CCM Fellow  My cell: [phone]

## 2018-10-25 PROBLEM — N50.89 TESTICLE SWELLING: Status: ACTIVE | Noted: 2018-10-25

## 2018-10-25 LAB
ALBUMIN SERPL BCP-MCNC: 2.2 G/DL
ALP SERPL-CCNC: 59 U/L
ALT SERPL W/O P-5'-P-CCNC: 7 U/L
ANION GAP SERPL CALC-SCNC: 8 MMOL/L
AST SERPL-CCNC: 10 U/L
BASOPHILS # BLD AUTO: 0 K/UL
BASOPHILS NFR BLD: 0 %
BILIRUB SERPL-MCNC: 0.3 MG/DL
BUN SERPL-MCNC: 19 MG/DL
CALCIUM SERPL-MCNC: 8.6 MG/DL
CHLORIDE SERPL-SCNC: 103 MMOL/L
CO2 SERPL-SCNC: 23 MMOL/L
COMPLEXED PSA SERPL-MCNC: 2.2 NG/ML
CREAT SERPL-MCNC: 0.7 MG/DL
DIFFERENTIAL METHOD: ABNORMAL
EOSINOPHIL # BLD AUTO: 0 K/UL
EOSINOPHIL NFR BLD: 0.1 %
ERYTHROCYTE [DISTWIDTH] IN BLOOD BY AUTOMATED COUNT: 13.6 %
EST. GFR  (AFRICAN AMERICAN): >60 ML/MIN/1.73 M^2
EST. GFR  (NON AFRICAN AMERICAN): >60 ML/MIN/1.73 M^2
GLUCOSE SERPL-MCNC: 143 MG/DL
HCT VFR BLD AUTO: 36.9 %
HGB BLD-MCNC: 11.9 G/DL
IMM GRANULOCYTES # BLD AUTO: 0.04 K/UL
IMM GRANULOCYTES NFR BLD AUTO: 0.4 %
LYMPHOCYTES # BLD AUTO: 1.1 K/UL
LYMPHOCYTES NFR BLD: 12.1 %
MAGNESIUM SERPL-MCNC: 2.4 MG/DL
MCH RBC QN AUTO: 26.9 PG
MCHC RBC AUTO-ENTMCNC: 32.2 G/DL
MCV RBC AUTO: 84 FL
MONOCYTES # BLD AUTO: 0.4 K/UL
MONOCYTES NFR BLD: 4.5 %
NEUTROPHILS # BLD AUTO: 7.6 K/UL
NEUTROPHILS NFR BLD: 82.9 %
NRBC BLD-RTO: 0 /100 WBC
PHOSPHATE SERPL-MCNC: 3.8 MG/DL
PLATELET # BLD AUTO: 257 K/UL
PMV BLD AUTO: 10.3 FL
POTASSIUM SERPL-SCNC: 4 MMOL/L
PROT SERPL-MCNC: 6.6 G/DL
RBC # BLD AUTO: 4.42 M/UL
SODIUM SERPL-SCNC: 134 MMOL/L
WBC # BLD AUTO: 9.16 K/UL

## 2018-10-25 PROCEDURE — 88341 IMHCHEM/IMCYTCHM EA ADD ANTB: CPT | Performed by: PATHOLOGY

## 2018-10-25 PROCEDURE — 63600175 PHARM REV CODE 636 W HCPCS: Performed by: STUDENT IN AN ORGANIZED HEALTH CARE EDUCATION/TRAINING PROGRAM

## 2018-10-25 PROCEDURE — 99152 MOD SED SAME PHYS/QHP 5/>YRS: CPT | Performed by: INTERNAL MEDICINE

## 2018-10-25 PROCEDURE — 99153 MOD SED SAME PHYS/QHP EA: CPT | Performed by: INTERNAL MEDICINE

## 2018-10-25 PROCEDURE — 80053 COMPREHEN METABOLIC PANEL: CPT

## 2018-10-25 PROCEDURE — 63600175 PHARM REV CODE 636 W HCPCS: Performed by: PHYSICIAN ASSISTANT

## 2018-10-25 PROCEDURE — 88104 CYTOPATH FL NONGYN SMEARS: CPT | Mod: 26,,, | Performed by: PATHOLOGY

## 2018-10-25 PROCEDURE — 88341 IMHCHEM/IMCYTCHM EA ADD ANTB: CPT | Mod: 26,,, | Performed by: PATHOLOGY

## 2018-10-25 PROCEDURE — 99223 1ST HOSP IP/OBS HIGH 75: CPT | Mod: ,,, | Performed by: PHYSICIAN ASSISTANT

## 2018-10-25 PROCEDURE — 88342 IMHCHEM/IMCYTCHM 1ST ANTB: CPT | Mod: 26,,, | Performed by: PATHOLOGY

## 2018-10-25 PROCEDURE — 25000003 PHARM REV CODE 250: Performed by: HOSPITALIST

## 2018-10-25 PROCEDURE — 88342 IMHCHEM/IMCYTCHM 1ST ANTB: CPT | Performed by: PATHOLOGY

## 2018-10-25 PROCEDURE — 63600175 PHARM REV CODE 636 W HCPCS: Performed by: INTERNAL MEDICINE

## 2018-10-25 PROCEDURE — 31628 BRONCHOSCOPY/LUNG BX EACH: CPT | Performed by: INTERNAL MEDICINE

## 2018-10-25 PROCEDURE — 31624 DX BRONCHOSCOPE/LAVAGE: CPT | Performed by: INTERNAL MEDICINE

## 2018-10-25 PROCEDURE — 31623 DX BRONCHOSCOPE/BRUSH: CPT | Performed by: INTERNAL MEDICINE

## 2018-10-25 PROCEDURE — 36415 COLL VENOUS BLD VENIPUNCTURE: CPT

## 2018-10-25 PROCEDURE — 25000003 PHARM REV CODE 250: Performed by: STUDENT IN AN ORGANIZED HEALTH CARE EDUCATION/TRAINING PROGRAM

## 2018-10-25 PROCEDURE — 25000003 PHARM REV CODE 250: Performed by: INTERNAL MEDICINE

## 2018-10-25 PROCEDURE — 25500020 PHARM REV CODE 255: Performed by: HOSPITALIST

## 2018-10-25 PROCEDURE — 88305 TISSUE EXAM BY PATHOLOGIST: CPT | Performed by: PATHOLOGY

## 2018-10-25 PROCEDURE — 27201011 HC FORCEPS DISPOSABLE: Performed by: INTERNAL MEDICINE

## 2018-10-25 PROCEDURE — 25000242 PHARM REV CODE 250 ALT 637 W/ HCPCS: Performed by: STUDENT IN AN ORGANIZED HEALTH CARE EDUCATION/TRAINING PROGRAM

## 2018-10-25 PROCEDURE — 84100 ASSAY OF PHOSPHORUS: CPT

## 2018-10-25 PROCEDURE — 88305 TISSUE EXAM BY PATHOLOGIST: CPT | Mod: 26,,, | Performed by: PATHOLOGY

## 2018-10-25 PROCEDURE — S4991 NICOTINE PATCH NONLEGEND: HCPCS | Performed by: STUDENT IN AN ORGANIZED HEALTH CARE EDUCATION/TRAINING PROGRAM

## 2018-10-25 PROCEDURE — 11000001 HC ACUTE MED/SURG PRIVATE ROOM

## 2018-10-25 PROCEDURE — 0BDJ8ZX EXTRACTION OF LEFT LOWER LUNG LOBE, VIA NATURAL OR ARTIFICIAL OPENING ENDOSCOPIC, DIAGNOSTIC: ICD-10-PCS | Performed by: INTERNAL MEDICINE

## 2018-10-25 PROCEDURE — 85025 COMPLETE CBC W/AUTO DIFF WBC: CPT

## 2018-10-25 PROCEDURE — 83735 ASSAY OF MAGNESIUM: CPT

## 2018-10-25 PROCEDURE — 84153 ASSAY OF PSA TOTAL: CPT

## 2018-10-25 RX ORDER — DEXAMETHASONE SODIUM PHOSPHATE 4 MG/ML
4 INJECTION, SOLUTION INTRA-ARTICULAR; INTRALESIONAL; INTRAMUSCULAR; INTRAVENOUS; SOFT TISSUE EVERY 6 HOURS
Status: DISCONTINUED | OUTPATIENT
Start: 2018-10-25 | End: 2018-10-26

## 2018-10-25 RX ORDER — FENTANYL CITRATE 50 UG/ML
INJECTION, SOLUTION INTRAMUSCULAR; INTRAVENOUS CODE/TRAUMA/SEDATION MEDICATION
Status: COMPLETED | OUTPATIENT
Start: 2018-10-25 | End: 2018-10-25

## 2018-10-25 RX ORDER — ASPIRIN 81 MG/1
81 TABLET ORAL DAILY
Status: DISCONTINUED | OUTPATIENT
Start: 2018-10-26 | End: 2018-11-04 | Stop reason: HOSPADM

## 2018-10-25 RX ORDER — MIDAZOLAM HYDROCHLORIDE 5 MG/ML
INJECTION INTRAMUSCULAR; INTRAVENOUS CODE/TRAUMA/SEDATION MEDICATION
Status: COMPLETED | OUTPATIENT
Start: 2018-10-25 | End: 2018-10-25

## 2018-10-25 RX ORDER — LIDOCAINE HYDROCHLORIDE 20 MG/ML
INJECTION, SOLUTION INFILTRATION; PERINEURAL CODE/TRAUMA/SEDATION MEDICATION
Status: COMPLETED | OUTPATIENT
Start: 2018-10-25 | End: 2018-10-25

## 2018-10-25 RX ORDER — LIDOCAINE HYDROCHLORIDE 20 MG/ML
SOLUTION OROPHARYNGEAL CODE/TRAUMA/SEDATION MEDICATION
Status: COMPLETED | OUTPATIENT
Start: 2018-10-25 | End: 2018-10-25

## 2018-10-25 RX ADMIN — LEVETIRACETAM 500 MG: 500 TABLET ORAL at 10:10

## 2018-10-25 RX ADMIN — LEVETIRACETAM 500 MG: 500 TABLET ORAL at 08:10

## 2018-10-25 RX ADMIN — CARVEDILOL 12.5 MG: 12.5 TABLET, FILM COATED ORAL at 08:10

## 2018-10-25 RX ADMIN — TAMSULOSIN HYDROCHLORIDE 0.4 MG: 0.4 CAPSULE ORAL at 10:10

## 2018-10-25 RX ADMIN — LIDOCAINE HYDROCHLORIDE 10 ML: 20 INJECTION, SOLUTION INFILTRATION; PERINEURAL at 08:10

## 2018-10-25 RX ADMIN — CARVEDILOL 12.5 MG: 12.5 TABLET, FILM COATED ORAL at 10:10

## 2018-10-25 RX ADMIN — MIDAZOLAM HYDROCHLORIDE 1 MG: 5 INJECTION, SOLUTION INTRAMUSCULAR; INTRAVENOUS at 08:10

## 2018-10-25 RX ADMIN — LIDOCAINE HYDROCHLORIDE 5 ML: 20 SOLUTION OROPHARYNGEAL at 08:10

## 2018-10-25 RX ADMIN — FENTANYL CITRATE 25 MCG: 50 INJECTION, SOLUTION INTRAMUSCULAR; INTRAVENOUS at 08:10

## 2018-10-25 RX ADMIN — TIOTROPIUM BROMIDE 18 MCG: 18 CAPSULE ORAL; RESPIRATORY (INHALATION) at 10:10

## 2018-10-25 RX ADMIN — IOHEXOL 100 ML: 350 INJECTION, SOLUTION INTRAVENOUS at 12:10

## 2018-10-25 RX ADMIN — TOPICAL ANESTHETIC 0.5 ML: 200 SPRAY DENTAL; PERIODONTAL at 08:10

## 2018-10-25 RX ADMIN — DEXAMETHASONE SODIUM PHOSPHATE 8 MG: 4 INJECTION, SOLUTION INTRAMUSCULAR; INTRAVENOUS at 05:10

## 2018-10-25 RX ADMIN — ATORVASTATIN CALCIUM 80 MG: 20 TABLET, FILM COATED ORAL at 10:10

## 2018-10-25 RX ADMIN — PANTOPRAZOLE SODIUM 40 MG: 40 TABLET, DELAYED RELEASE ORAL at 10:10

## 2018-10-25 RX ADMIN — NICOTINE 1 PATCH: 14 PATCH, EXTENDED RELEASE TRANSDERMAL at 12:10

## 2018-10-25 RX ADMIN — DEXAMETHASONE SODIUM PHOSPHATE 4 MG: 4 INJECTION, SOLUTION INTRAMUSCULAR; INTRAVENOUS at 04:10

## 2018-10-25 RX ADMIN — FINASTERIDE 5 MG: 5 TABLET, FILM COATED ORAL at 10:10

## 2018-10-25 NOTE — HOSPITAL COURSE
10/25 underwent bronchoscopy, biopsy was taken from the his lung mass, MRI brain showed frontal mass with vasogenic edema, abdominal CT showed multiple liver mass concerning for met, neurosurgery consulted will involve hem/och tomorrow.   10/26 U/S testicles showed right sided hydrocele,will follow with urology out patient, biopsy still in process, no acute interventions by neurosurgery. hem/onc following and developing treatment plan.  10/27-28-29 Pt is stable, except his BP in which we added amlodipine for a better control.  10/30 Plan for rehab placement is pending. Pt will F/U with hem/onc @ \[facility]". In regards to radiation therapy for his brain lesion, Neurosurgery recommended to perform the procedure in [facility] as an outpatient.  10/31 patient denied multiple rehabs, SW/CM working on placement issues.     11/1-3 No changes, pt leaving to home with home health. Then post oncology appointment and gamma radiation therapy if pt needed to go rehab, it will organized by .  11/4 appointments and H/H arranged, Patient should go to rehab after finishing radiotherapy, will discharge home with home health today.

## 2018-10-25 NOTE — HOSPITAL COURSE
10/25: Radiosurgery vs. Resection, Dr. De Jesus to see   10/26: Radiosurgery as outpatient. Will continue to follow  10/27: Radiosurgery as outpatient. Will continue to follow  10/28: SARINA SANTOS, exam stable w/ continued RUE weakness, will continue to follow

## 2018-10-25 NOTE — ASSESSMENT & PLAN NOTE
Mr. 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 Resection of tumor, will round with staff  -q4h neuro checks  -Continue dex for cerebral edema, changed to 4mg q6h  -PPI while on dex  -SBP <160  -Continue Keppra 500mg for seizure prophylaxis   -Pulm lesion - s/p bronch with pulm, will f/u cytology & pathology results  -Discussed with Dr. De Jesus

## 2018-10-25 NOTE — PHARMACY MED REC
"Admission Medication Reconciliation - Pharmacy Consult Note    The home medication history was taken by Isatu Caraballo, Pharmacy Technician. Based on information gathered and subsequent review by the clinical pharmacist, the items below may need attention.    You may go to "Admission" then "Reconcile Home Medications" tabs to review and/or act upon these items.    No issues noted with the medication reconciliation.    Please address this information as you see fit.  Feel free to contact us if you have any questions or require assistance.    Ofelia Markham, PharmD, BCPS  [phone]    No medications prior to admission.       .    .          "

## 2018-10-25 NOTE — ASSESSMENT & PLAN NOTE
Lung mass  Vasogenic cerebral edema  66 yo M w/ PMH of HTN, CAD s/p CABG, carotid artery disease s/p stenting, and elevated PSA who presents as transfer for evaluation of brain lesion w/ newly found mass-like consolidations on CXR. Concern for primary lung lesion w/ significant smoking history and brain metastasis w/ vasogenic edema. Mild expressive aphasia, R>L weakness, difficulty w/ mobility. Hypertensive concerning for Cushing reflex secondary to cerebral edema. HR wnl.   - IV decadron 4mg q6  - keppra 500 mg BID  - neurochecks q4  - CT Neck/chest/abdomen/pelvis w/ contrast showed b/l lung mass, liver masses and  MRI brain showed frontal brain mass  - underwent bronch biopsy   - neurosurgery following appreciate their recs   - vladislav consult hem/onc in the morning.   - follow biopsy results .

## 2018-10-25 NOTE — PLAN OF CARE
DAUGHTER MONET CALLED ME THIS AM. WANTS MD TO UPDATE HER TODAY ON POC. EMAIL TO DR SADLER. ALSO, DAUGHTER WANTED ME TO PUT IN STICKY NOTE THAT HER DAD HAS 2ND GRADE EDUCATION AND CAN'T READ TOO WELL.  CM ADDED THIS TO STICKY NOTE  DAUGHTER WILL BE HERE TOMORROW AM  REC'     10/25/18 0815   Right Care Assessment   Can the patient answer the patient profile reliably? Yes, cognitively intact   How often would a person be available to care for the patient? Often   Describe the patient's ability to walk at the present time. Major restrictions/daily assistance from another person   How does the patient rate their overall health at the present time? Fair   Number of comorbid conditions (as recorded on the chart) Three   During the past month, has the patient often been bothered by feeling down, depressed or hopeless? No   During the past month, has the patient often been bothered by little interest or pleasure in doing things? No   D NOTICE FROM [facility] FOR INITIAL REVIEW . SENT THAT AS WELL AS REVIEW FOR TODAY  ASKED MD TO ORDER PT/OT IN ADDITION TO PROGRESSIVE MOBILITY THAT'S ALREADY ORDERED.      DC PLAN: JAVAD

## 2018-10-25 NOTE — SEDATION DOCUMENTATION
Specimens obtained during Bronchoscopy:  TBBX LLL, [place] cyto LLL.  Verbal report given to Gary to include documentation charted in procedural sedation documentation.  Patient to be NPO 1 hour post procedure and place in PO tolerance at 1005.  Moderate concious sedation was performed and cardiorespiratory functions were monitored the entire procedure by Leola Day RN.  Sedation began at 0831  and concluded at 0910.  Leola Day RN

## 2018-10-25 NOTE — ASSESSMENT & PLAN NOTE
- acknowledges need to quit  - would benefit from smoking cessation counseling  - nicotine patch.

## 2018-10-25 NOTE — ASSESSMENT & PLAN NOTE
Unsure duration  - patient reported it was noticed by his PCP 6 month ago  - denies pain, reported difficulty urination, dysuria and dripping   - right testicular swelling, soft,non tender. Soft non tender prostate on exam.  PSA   Lab Results   Component Value Date    PSA 2.2 10/25/2018    PSA 6.9 (H) 05/18/2017   - follow up testicular U/S  - continue Flomax and finasteride  - condom cath

## 2018-10-25 NOTE — SUBJECTIVE & OBJECTIVE
Interval History: underwent bronchoscopy with biopsy   Review of Systems   Constitutional: Negative for chills and fever.   HENT: Positive for congestion and trouble swallowing.    Eyes: Negative for visual disturbance.   Respiratory: Positive for cough and shortness of breath.    Cardiovascular: Positive for chest pain (From R chest across to L). Negative for leg swelling.   Gastrointestinal: Negative for abdominal pain, nausea and vomiting.   Genitourinary: Positive for difficulty urinating, dysuria, frequency (rt side ), hematuria and scrotal swelling.   Skin: Negative for rash.   Neurological: Positive for weakness. Negative for dizziness, numbness and headaches.   Psychiatric/Behavioral: Positive for confusion.     Objective:     Vital Signs (Most Recent):  Temp: 96.3 °F (35.7 °C) (10/25/18 1207)  Pulse: 70 (10/25/18 1207)  Resp: 18 (10/25/18 1207)  BP: (!) 144/64 (10/25/18 1207)  SpO2: 98 % (10/25/18 1207) Vital Signs (24h Range):  Temp:  [96.3 °F (35.7 °C)-97.9 °F (36.6 °C)] 96.3 °F (35.7 °C)  Pulse:  [43-70] 70  Resp:  [11-28] 18  SpO2:  [82 %-100 %] 98 %  BP: (102-170)/(54-92) 144/64     Weight: 74.8 kg (165 lb)  Body mass index is 25.84 kg/m².    Intake/Output Summary (Last 24 hours) at 10/25/2018 1632  Last data filed at 10/25/2018 0327  Gross per 24 hour   Intake --   Output 1450 ml   Net -1450 ml      Physical Exam   Constitutional: He is oriented to person, place, and time. He appears well-developed and well-nourished. No distress.   HENT:   Head: Normocephalic and atraumatic.   Mouth/Throat: Oropharynx is clear and moist. No oropharyngeal exudate.   Eyes: Conjunctivae and EOM are normal.   Neck: Normal range of motion. Neck supple.   Cardiovascular: Normal rate, regular rhythm, normal heart sounds and intact distal pulses.   Pulmonary/Chest: Effort normal and breath sounds normal. No respiratory distress.   Abdominal: Soft. Bowel sounds are normal. There is no tenderness.   Genitourinary:    Genitourinary Comments: Right sided scrotal swelling, soft, non tender, prostate exam -ve for hard masses or tenderness    Musculoskeletal: Normal range of motion. He exhibits no edema.   Neurological: He is alert and oriented to person, place, and time.   AAOx3  Mild expressive aphasia  Symmetric smile; no gross CN deficits  Strength 5/5 on L, 4/5 RUE, 3/5 RLE  No sensory deficits noted   Skin: Skin is warm and dry.       Significant Labs:   CBC:   Recent Labs   Lab 10/24/18  0427 10/25/18  0457   WBC 7.55 9.16   HGB 12.5* 11.9*   HCT 39.8* 36.9*    257     CMP:   Recent Labs   Lab 10/24/18  0427 10/25/18  0457    134*   K 3.8 4.0    103   CO2 21* 23   * 143*   BUN 12 19   CREATININE 1.0 0.7   CALCIUM 8.8 8.6*   PROT 7.2 6.6   ALBUMIN 2.3* 2.2*   BILITOT 0.4 0.3   ALKPHOS 72 59   AST 6* 10   ALT 7* 7*   ANIONGAP 10 8   EGFRNONAA >60.0 >60.0       Significant Imaging: I have reviewed all pertinent imaging results/findings within the past 24 hours.

## 2018-10-25 NOTE — SUBJECTIVE & OBJECTIVE
Medications:  Continuous Infusions:  Scheduled Meds:   atorvastatin  80 mg Oral Daily    carvedilol  12.5 mg Oral BID    dexamethasone  4 mg Intravenous Q6H    finasteride  5 mg Oral Daily    levETIRAcetam  500 mg Oral BID    nicotine  1 patch Transdermal Daily    pantoprazole  40 mg Oral Daily    tamsulosin  0.4 mg Oral Daily    tiotropium  1 capsule Inhalation Daily     PRN Meds:dextrose 50%, dextrose 50%, glucagon (human recombinant), glucose, glucose, hydrALAZINE, HYDROcodone-acetaminophen, sodium chloride 0.9%     Review of Systems   Unable to obtain secondary to AMS    Objective:     Weight: 74.8 kg (165 lb)  Body mass index is 25.84 kg/m².  Vital Signs (Most Recent):  Temp: 96.3 °F (35.7 °C) (10/25/18 1207)  Pulse: 70 (10/25/18 1207)  Resp: 18 (10/25/18 1207)  BP: (!) 144/64 (10/25/18 1207)  SpO2: 98 % (10/25/18 1207) Vital Signs (24h Range):  Temp:  [96.3 °F (35.7 °C)-97.9 °F (36.6 °C)] 96.3 °F (35.7 °C)  Pulse:  [43-70] 70  Resp:  [11-28] 18  SpO2:  [82 %-100 %] 98 %  BP: (102-170)/(54-92) 144/64                 Oxygen Concentration (%):  [100] 100         Neurosurgery Physical Exam   General: well developed, well nourished, no distress  Head: normocephalic, atraumatic  Neurologic: Alert and oriented. Thought content appropriate  GCS: Motor: 6/Verbal: 5/Eyes: 4 GCS Total: 15  Mental Status: Awake, Alert, Oriented x 4  Language: No aphasia  Speech: No dysarthria  Cranial nerves: slight right facial droop, tongue midline, CN II-XII grossly intact.   Eyes: pupils equal, round, reactive to light with accommodation, EOMI  Pulmonary: normal respirations, not labored, no accessory muscles used  Abdomen: soft, non-distended, not tender to palpation  Sensory: intact to light touch throughout  Motor Strength: Moves all extremities spontaneously with good tone.   No abnormal movements seen.     Strength  Deltoids Triceps Biceps Wrist Extension Wrist Flexion Hand    Upper: R 4/5 4/5 4/5 4/5 4/5 4+/5    L  5/5 5/5 5/5 5/5 5/5 5/5     Iliopsoas Quadriceps Knee  Flexion Tibialis  anterior Gastro- cnemius EHL   Lower: R 4/5 4/5 4/5 4/5 4/5 4/5    L 5/5 5/5 5/5 5/5 5/5 5/5     Pronator Drift: right drift   Finger-to-nose: Intact bilaterally  Singh: absent  Clonus: absent  Babinski: absent  Pulses: 2+ and symmetric radial and dorsalis pedis  Skin: warm, dry and intact, no rashes          Significant Labs:  Recent Labs   Lab 10/23/18  1451 10/24/18  0427 10/25/18  0457   GLU 97 120* 143*    137 134*   K 3.6 3.8 4.0    106 103   CO2 20* 21* 23   BUN 9 12 19   CREATININE 0.8 1.0 0.7   CALCIUM 9.1 8.8 8.6*   MG  --  2.1 2.4     Recent Labs   Lab 10/23/18  1451 10/24/18  0427 10/25/18  0457   WBC 11.67 7.55 9.16   HGB 12.6* 12.5* 11.9*   HCT 40.8 39.8* 36.9*    276 257     No results for input(s): LABPT, INR, APTT in the last 48 hours.  Microbiology Results (last 7 days)     ** No results found for the last 168 hours. **          Significant Diagnostics:  I personally reviewed CT Head & agree with the findings: Vasogenic edema left frontal and anterior left parietal region with question of approximately 1 cm nodular lesion posteromedial left frontal lobe the combination of findings concerning for neoplasm either primary or secondary although likely metastatic focus given findings on chest x-ray.  Follow-up MRI brain without and with contrast can be obtained to better assess.    Underlying developmental abnormality of brain including what appears to represent changes agenesis of corpus callosum, colpocephaly, extra-axial cyst medial right parietooccipital region and underlying dysplastic appearance of brain posterior right frontal region marginating the ventricle.    I personally reviewed MRI Brain & agree with the findings: Small left parasagittal posterior frontal cortical enhancing lesion with moderate to large underlying vasogenic edema with subcentimeter nodular region of enhancement in the right  caudate.  In light of history concerning for possible metastatic disease.    Superimposed dysmorphic right cerebral hemisphere with underlying prominent gray matter heterotopia and the right parietal encephalomalacia compatible with migrational anomaly and possible prior vascular event.    There is a large extra-axial cyst overlying the right parasagittal parietal lobe suggestive for an arachnoid cyst.    Prominence of the 3rd and right lateral ventricle and adjacent sulci likely compensatory to volume loss without definite hydrocephalus

## 2018-10-25 NOTE — SEDATION DOCUMENTATION
Patient arrived to room, patient placed on cardiac monitor   Anesthesia Plan:  conscious sedation   ASA verified-yes  Airway exam performed-yes  Personal or Family history of anesthesia complications-No  Consent signed and witnessed, Leola Day RN

## 2018-10-25 NOTE — ASSESSMENT & PLAN NOTE
- Described as sharp, radiating from his R chest across to the L, non-reproducible  - Reports that this is a chronic occurrence, comes and goes   - Concern for MSK vs ACS vs PE vs likely cancerous masses on CXR  - Obtained EKG, negative for STEMI; troponin pending  - resumed aspirin 81 mg  --- resolved
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
## 2018-10-25 NOTE — PLAN OF CARE
Problem: Patient Care Overview  Goal: Plan of Care Review  Outcome: Ongoing (interventions implemented as appropriate)  Patient AAOx4. Patient VSS. Patient denies pain. Patient free from falls or injury during shift. Patient free from skin breakdown during shift. Patient repositioned every two hours. Patient in bed, bed in lowest position, call light in reach, bed alarm set, and personal items at bedside. Will continue to monitor.

## 2018-10-25 NOTE — ASSESSMENT & PLAN NOTE
- Noted to have bilateral lung masses on CXR, not evident on prior imaging from 9/17, concerning for primary lung malignancy in this patient with prolonged reported smoking history approx 32 pack years. He presents with altered mental status and right sided generalized weakness, found to have 1cm mass in left frontal lobe with vasogenic edema and mass effect concerning for possible mets.   - Transbronchial biopsy of LLL lung mass with brush cytology this morning.   - Please keep patient NPO until 1 hour post procedure  - Would consult heme/onc for recs pending pathology results.

## 2018-10-25 NOTE — NURSING
Contacted IM-1.  Pt returned from bronchoscopy.  VSS on room air.  Per pulm lab, OK to advance pt to clear liquid diet after 1005.  Provider will order diet and advance as tolerated diet.

## 2018-10-25 NOTE — CONSULTS
[facility]  Neurosurgery  Consult Note    Consults  Subjective:     Chief Complaint/Reason for Admission: Brain mass    History of Present Illness: Mr. Tran is a 67yoM with PMHx HTN, CAD s/p CABG, carotid artery disease s/p stent, who transferred from [facility] ED for altered mental status & generalized weakness, found to have a brain lesion on CTH.  He was transferred to [facility] for Nsurg evaluation.  History is obtained from patient & chart, as patient continues to have AMS.  Per chart, his family brought him in after he attempted to take a bath & was unable to get out of the bathtub and his family did not feel he was acting himself.  He reports baseline RLE weakness due to a work accident many years ago, but feels RUE weakness is new.  Patient does have a smoking history & was found to have lung mass, as well as liver lesions.  He is not on any antiplatelet or anticoagulant agents.  Unknown if prior stroke, no family available bedside.      Medications:  Continuous Infusions:  Scheduled Meds:   atorvastatin  80 mg Oral Daily    carvedilol  12.5 mg Oral BID    dexamethasone  4 mg Intravenous Q6H    finasteride  5 mg Oral Daily    levETIRAcetam  500 mg Oral BID    nicotine  1 patch Transdermal Daily    pantoprazole  40 mg Oral Daily    tamsulosin  0.4 mg Oral Daily    tiotropium  1 capsule Inhalation Daily     PRN Meds:dextrose 50%, dextrose 50%, glucagon (human recombinant), glucose, glucose, hydrALAZINE, HYDROcodone-acetaminophen, sodium chloride 0.9%     Review of Systems   Unable to obtain secondary to AMS    Objective:     Weight: 74.8 kg (165 lb)  Body mass index is 25.84 kg/m².  Vital Signs (Most Recent):  Temp: 96.3 °F (35.7 °C) (10/25/18 1207)  Pulse: 70 (10/25/18 1207)  Resp: 18 (10/25/18 1207)  BP: (!) 144/64 (10/25/18 1207)  SpO2: 98 % (10/25/18 1207) Vital Signs (24h Range):  Temp:  [96.3 °F (35.7 °C)-97.9 °F (36.6 °C)] 96.3 °F (35.7 °C)  Pulse:  [43-70] 70  Resp:   [11-28] 18  SpO2:  [82 %-100 %] 98 %  BP: (102-170)/(54-92) 144/64                 Oxygen Concentration (%):  [100] 100         Neurosurgery Physical Exam   General: well developed, well nourished, no distress  Head: normocephalic, atraumatic  Neurologic: Alert and oriented. Thought content appropriate  GCS: Motor: 6/Verbal: 5/Eyes: 4 GCS Total: 15  Mental Status: Awake, Alert, Oriented x 4  Language: No aphasia  Speech: No dysarthria  Cranial nerves: slight right facial droop, tongue midline, CN II-XII grossly intact.   Eyes: pupils equal, round, reactive to light with accommodation, EOMI  Pulmonary: normal respirations, not labored, no accessory muscles used  Abdomen: soft, non-distended, not tender to palpation  Sensory: intact to light touch throughout  Motor Strength: Moves all extremities spontaneously with good tone.   No abnormal movements seen.     Strength  Deltoids Triceps Biceps Wrist Extension Wrist Flexion Hand    Upper: R 4/5 4/5 4/5 4/5 4/5 4+/5    L 5/5 5/5 5/5 5/5 5/5 5/5     Iliopsoas Quadriceps Knee  Flexion Tibialis  anterior Gastro- cnemius EHL   Lower: R 4/5 4/5 4/5 4/5 4/5 4/5    L 5/5 5/5 5/5 5/5 5/5 5/5     Pronator Drift: right drift   Finger-to-nose: Intact bilaterally  Singh: absent  Clonus: absent  Babinski: absent  Pulses: 2+ and symmetric radial and dorsalis pedis  Skin: warm, dry and intact, no rashes          Significant Labs:  Recent Labs   Lab 10/23/18  1451 10/24/18  0427 10/25/18  0457   GLU 97 120* 143*    137 134*   K 3.6 3.8 4.0    106 103   CO2 20* 21* 23   BUN 9 12 19   CREATININE 0.8 1.0 0.7   CALCIUM 9.1 8.8 8.6*   MG  --  2.1 2.4     Recent Labs   Lab 10/23/18  1451 10/24/18  0427 10/25/18  0457   WBC 11.67 7.55 9.16   HGB 12.6* 12.5* 11.9*   HCT 40.8 39.8* 36.9*    276 257     No results for input(s): LABPT, INR, APTT in the last 48 hours.  Microbiology Results (last 7 days)     ** No results found for the last 168 hours. **          Significant  Diagnostics:  I personally reviewed CT Head & agree with the findings: Vasogenic edema left frontal and anterior left parietal region with question of approximately 1 cm nodular lesion posteromedial left frontal lobe the combination of findings concerning for neoplasm either primary or secondary although likely metastatic focus given findings on chest x-ray.  Follow-up MRI brain without and with contrast can be obtained to better assess.    Underlying developmental abnormality of brain including what appears to represent changes agenesis of corpus callosum, colpocephaly, extra-axial cyst medial right parietooccipital region and underlying dysplastic appearance of brain posterior right frontal region marginating the ventricle.    I personally reviewed MRI Brain & agree with the findings: Small left parasagittal posterior frontal cortical enhancing lesion with moderate to large underlying vasogenic edema with subcentimeter nodular region of enhancement in the right caudate.  In light of history concerning for possible metastatic disease.    Superimposed dysmorphic right cerebral hemisphere with underlying prominent gray matter heterotopia and the right parietal encephalomalacia compatible with migrational anomaly and possible prior vascular event.    There is a large extra-axial cyst overlying the right parasagittal parietal lobe suggestive for an arachnoid cyst.    Prominence of the 3rd and right lateral ventricle and adjacent sulci likely compensatory to volume loss without definite hydrocephalus    Assessment/Plan:     * Brain metastasis    Mr. 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 Resection of tumor, will round with staff  -q4h neuro checks  -Continue dex for cerebral edema, changed to 4mg q6h  -PPI while  on dex  -SBP <160  -Continue Keppra 500mg for seizure prophylaxis   -Pulm lesion - s/p bronch with pulm, will f/u cytology & pathology results  -Discussed with Dr. De Jesus         Thank you for your consult. I will follow-up with patient. Please contact us if you have any additional questions.    Verena Nicholas PA-C  Neurosurgery  [facility]

## 2018-10-25 NOTE — ASSESSMENT & PLAN NOTE
- O2 sats room air 91-94% last 24 hours. Complains of worsening dyspnea with ambulation or exertion along with a chronic cough with wheezing and occasional sputum production. Likely sequela of undiagnosed COPD in this patient with long history of tobacco use.  - Continue spiriva, with short acting bronchodilator PRN  - Recommend outpatient follow up for PFTs  - Encourage smoking cessation  - Recommend influenza and PPSV23 vaccines (Patient received PCV13 on 4/25/17)

## 2018-10-25 NOTE — PROGRESS NOTES
[facility] Hospital Medicine  Progress Note    Patient Name: Lorenzo Tran Sr.  MRN: [id number]  Patient Class: IP- Inpatient   Admission Date: 10/23/2018  Length of Stay: 2 days  Attending Physician: Berenice Shipman MD  Primary Care Provider: Padmini Dailey [facility] Medicine Team: [facility] [place] [place] HOSP MED 1 YUNIOR Wu    Subjective:     Principal Problem:Brain metastasis    HPI:  68 yo M w/ PMH of HTN, CAD s/p CABG, carotid artery disease s/p stenting, and elevated PSA who presents as transfer for evaluation of brain lesion. Patient was brought to [facility] ED by his family w/ concern of altered mental status and generalized weakness. Family was concerned that he was not acting like himself. Patient shares that yesterday he attempted to take a bath and was unable to lift himself out of the tub. He waited 4 hours until a family member called EMS. He endorses chronic RLE weakness secondary to work related incident approximately 30 years ago. He also endorses new RUE weakness. On evaluation in the ED, he was noted to have very mild R facial droop. CT head demonstrated vasogenic edema w/ 1 cm nodular lesion in the L frontal lobe. CXR was significant for mass like consolidations in the L lung. Complains of chronic cough w/ occasional sputum. Shares that he had 1 episode of hemoptysis 2-3 months ago. Patient has no previous lung history but endorses a long history of smoking cigars. Reports that he would smoke a pack of cigars a day. In the ED prior to transfer he received decadron 10mg. No seizure like activity noted and no previous seizure history. Reports shortness of breath and urinary symptoms. Denies fever, chills, chest pain, and abdominal pain.       Hospital Course:  10/25 underwent bronchoscopy, biopsy was taken from the his lung mass, MRI brain showed frontal mass with vasogenic edema, abdominal CT showed multiple liver mass concerning for met, neurosurgery consulted will  involve hem/och tomorrow.     Interval History: underwent bronchoscopy with biopsy   Review of Systems   Constitutional: Negative for chills and fever.   HENT: Positive for congestion and trouble swallowing.    Eyes: Negative for visual disturbance.   Respiratory: Positive for cough and shortness of breath.    Cardiovascular: Positive for chest pain (From R chest across to L). Negative for leg swelling.   Gastrointestinal: Negative for abdominal pain, nausea and vomiting.   Genitourinary: Positive for difficulty urinating, dysuria, frequency (rt side ), hematuria and scrotal swelling.   Skin: Negative for rash.   Neurological: Positive for weakness. Negative for dizziness, numbness and headaches.   Psychiatric/Behavioral: Positive for confusion.     Objective:     Vital Signs (Most Recent):  Temp: 96.3 °F (35.7 °C) (10/25/18 1207)  Pulse: 70 (10/25/18 1207)  Resp: 18 (10/25/18 1207)  BP: (!) 144/64 (10/25/18 1207)  SpO2: 98 % (10/25/18 1207) Vital Signs (24h Range):  Temp:  [96.3 °F (35.7 °C)-97.9 °F (36.6 °C)] 96.3 °F (35.7 °C)  Pulse:  [43-70] 70  Resp:  [11-28] 18  SpO2:  [82 %-100 %] 98 %  BP: (102-170)/(54-92) 144/64     Weight: 74.8 kg (165 lb)  Body mass index is 25.84 kg/m².    Intake/Output Summary (Last 24 hours) at 10/25/2018 1632  Last data filed at 10/25/2018 0327  Gross per 24 hour   Intake --   Output 1450 ml   Net -1450 ml      Physical Exam   Constitutional: He is oriented to person, place, and time. He appears well-developed and well-nourished. No distress.   HENT:   Head: Normocephalic and atraumatic.   Mouth/Throat: Oropharynx is clear and moist. No oropharyngeal exudate.   Eyes: Conjunctivae and EOM are normal.   Neck: Normal range of motion. Neck supple.   Cardiovascular: Normal rate, regular rhythm, normal heart sounds and intact distal pulses.   Pulmonary/Chest: Effort normal and breath sounds normal. No respiratory distress.   Abdominal: Soft. Bowel sounds are normal. There is no tenderness.    Genitourinary:   Genitourinary Comments: Right sided scrotal swelling, soft, non tender, prostate exam -ve for hard masses or tenderness    Musculoskeletal: Normal range of motion. He exhibits no edema.   Neurological: He is alert and oriented to person, place, and time.   AAOx3  Mild expressive aphasia  Symmetric smile; no gross CN deficits  Strength 5/5 on L, 4/5 RUE, 3/5 RLE  No sensory deficits noted   Skin: Skin is warm and dry.       Significant Labs:   CBC:   Recent Labs   Lab 10/24/18  0427 10/25/18  0457   WBC 7.55 9.16   HGB 12.5* 11.9*   HCT 39.8* 36.9*    257     CMP:   Recent Labs   Lab 10/24/18  0427 10/25/18  0457    134*   K 3.8 4.0    103   CO2 21* 23   * 143*   BUN 12 19   CREATININE 1.0 0.7   CALCIUM 8.8 8.6*   PROT 7.2 6.6   ALBUMIN 2.3* 2.2*   BILITOT 0.4 0.3   ALKPHOS 72 59   AST 6* 10   ALT 7* 7*   ANIONGAP 10 8   EGFRNONAA >60.0 >60.0       Significant Imaging: I have reviewed all pertinent imaging results/findings within the past 24 hours.    Assessment/Plan:      * Brain metastasis    Lung mass  Vasogenic cerebral edema  66 yo M w/ PMH of HTN, CAD s/p CABG, carotid artery disease s/p stenting, and elevated PSA who presents as transfer for evaluation of brain lesion w/ newly found mass-like consolidations on CXR. Concern for primary lung lesion w/ significant smoking history and brain metastasis w/ vasogenic edema. Mild expressive aphasia, R>L weakness, difficulty w/ mobility. Hypertensive concerning for Cushing reflex secondary to cerebral edema. HR wnl.   - IV decadron 4mg q6  - keppra 500 mg BID  - neurochecks q4  - CT Neck/chest/abdomen/pelvis w/ contrast showed b/l lung mass, liver masses and  MRI brain showed frontal brain mass  - underwent bronch biopsy   - neurosurgery following appreciate their recs   - vladislav consult hem/onc in the morning.   - follow biopsy results .     Testicle swelling    Unsure duration  - patient reported it was noticed by his PCP 6  month ago  - denies pain, reported difficulty urination, dysuria and dripping   - right testicular swelling, soft,non tender. Soft non tender prostate on exam.  PSA   Lab Results   Component Value Date    PSA 2.2 10/25/2018    PSA 6.9 (H) 05/18/2017   - follow up testicular U/S  - continue Flomax and finasteride  - condom cath                  Chest pain    - Described as sharp, radiating from his R chest across to the L, non-reproducible  - Reports that this is a chronic occurrence, comes and goes   - Concern for MSK vs ACS vs PE vs likely cancerous masses on CXR  - Obtained EKG, negative for STEMI; troponin pending  - resumed aspirin 81 mg  --- resolved      Essential hypertension    - stable  - currently normotensive  - resume coreg 12.5 mg BID  - possibly secondary to cushing reflex, will continue to monitor  - SBP <200       MCCRAY (dyspnea on exertion)    - Chronic likely secondary to COPD given CXR findings and smoking history  - Not on O2 at home  - SpO2 mid 90s on RA  - duonebs PRN  - begin tiotropium daily       Elevated PSA    - hx of elevated PSA  - nursing reports of urinary incontinence, dribbling, weak stream  - resume finasteride, tamsulosin       Coronary artery disease involving native heart    Continue coreg, Asprin ant atorvastatin          Tobacco abuse    - acknowledges need to quit  - would benefit from smoking cessation counseling  - nicotine patch.          VTE Risk Mitigation (From admission, onward)        Ordered     IP VTE HIGH RISK PATIENT  Once      10/24/18 0342              YUNIOR Wu  Department of Hospital Medicine   [facility]
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
## 2018-10-26 ENCOUNTER — TELEPHONE (OUTPATIENT)
Dept: NEUROSURGERY | Facility: CLINIC | Age: 68
End: 2018-10-26

## 2018-10-26 PROBLEM — Z75.8 DISCHARGE PLANNING ISSUES: Status: ACTIVE | Noted: 2018-10-26

## 2018-10-26 LAB
ALBUMIN SERPL BCP-MCNC: 2.3 G/DL
ALP SERPL-CCNC: 71 U/L
ALT SERPL W/O P-5'-P-CCNC: 15 U/L
ANION GAP SERPL CALC-SCNC: 10 MMOL/L
AST SERPL-CCNC: 17 U/L
BASOPHILS # BLD AUTO: 0.01 K/UL
BASOPHILS NFR BLD: 0.1 %
BILIRUB SERPL-MCNC: 0.3 MG/DL
BUN SERPL-MCNC: 17 MG/DL
CALCIUM SERPL-MCNC: 8.7 MG/DL
CHLORIDE SERPL-SCNC: 104 MMOL/L
CO2 SERPL-SCNC: 23 MMOL/L
CREAT SERPL-MCNC: 0.7 MG/DL
DIFFERENTIAL METHOD: ABNORMAL
EOSINOPHIL # BLD AUTO: 0 K/UL
EOSINOPHIL NFR BLD: 0.4 %
ERYTHROCYTE [DISTWIDTH] IN BLOOD BY AUTOMATED COUNT: 13.8 %
EST. GFR  (AFRICAN AMERICAN): >60 ML/MIN/1.73 M^2
EST. GFR  (NON AFRICAN AMERICAN): >60 ML/MIN/1.73 M^2
GLUCOSE SERPL-MCNC: 85 MG/DL
HCT VFR BLD AUTO: 40.9 %
HGB BLD-MCNC: 12.8 G/DL
IMM GRANULOCYTES # BLD AUTO: 0.11 K/UL
IMM GRANULOCYTES NFR BLD AUTO: 1 %
LYMPHOCYTES # BLD AUTO: 1.1 K/UL
LYMPHOCYTES NFR BLD: 10.3 %
MAGNESIUM SERPL-MCNC: 2.3 MG/DL
MCH RBC QN AUTO: 27.1 PG
MCHC RBC AUTO-ENTMCNC: 31.3 G/DL
MCV RBC AUTO: 87 FL
MONOCYTES # BLD AUTO: 0.6 K/UL
MONOCYTES NFR BLD: 6 %
NEUTROPHILS # BLD AUTO: 8.8 K/UL
NEUTROPHILS NFR BLD: 82.2 %
NRBC BLD-RTO: 0 /100 WBC
PHOSPHATE SERPL-MCNC: 3.4 MG/DL
PLATELET # BLD AUTO: 283 K/UL
PMV BLD AUTO: 10.4 FL
POCT GLUCOSE: 132 MG/DL (ref 70–110)
POCT GLUCOSE: 158 MG/DL (ref 70–110)
POCT GLUCOSE: 194 MG/DL (ref 70–110)
POCT GLUCOSE: 313 MG/DL (ref 70–110)
POTASSIUM SERPL-SCNC: 4 MMOL/L
PROT SERPL-MCNC: 6.6 G/DL
RBC # BLD AUTO: 4.73 M/UL
SODIUM SERPL-SCNC: 137 MMOL/L
WBC # BLD AUTO: 10.74 K/UL

## 2018-10-26 PROCEDURE — 87086 URINE CULTURE/COLONY COUNT: CPT

## 2018-10-26 PROCEDURE — 99233 SBSQ HOSP IP/OBS HIGH 50: CPT | Mod: ,,, | Performed by: PHYSICIAN ASSISTANT

## 2018-10-26 PROCEDURE — 25000003 PHARM REV CODE 250: Performed by: STUDENT IN AN ORGANIZED HEALTH CARE EDUCATION/TRAINING PROGRAM

## 2018-10-26 PROCEDURE — 25000242 PHARM REV CODE 250 ALT 637 W/ HCPCS: Performed by: STUDENT IN AN ORGANIZED HEALTH CARE EDUCATION/TRAINING PROGRAM

## 2018-10-26 PROCEDURE — 63600175 PHARM REV CODE 636 W HCPCS: Performed by: STUDENT IN AN ORGANIZED HEALTH CARE EDUCATION/TRAINING PROGRAM

## 2018-10-26 PROCEDURE — S4991 NICOTINE PATCH NONLEGEND: HCPCS | Performed by: STUDENT IN AN ORGANIZED HEALTH CARE EDUCATION/TRAINING PROGRAM

## 2018-10-26 PROCEDURE — 83735 ASSAY OF MAGNESIUM: CPT

## 2018-10-26 PROCEDURE — 97530 THERAPEUTIC ACTIVITIES: CPT

## 2018-10-26 PROCEDURE — 97162 PT EVAL MOD COMPLEX 30 MIN: CPT

## 2018-10-26 PROCEDURE — 97165 OT EVAL LOW COMPLEX 30 MIN: CPT

## 2018-10-26 PROCEDURE — 99232 SBSQ HOSP IP/OBS MODERATE 35: CPT | Mod: ,,, | Performed by: INTERNAL MEDICINE

## 2018-10-26 PROCEDURE — 97535 SELF CARE MNGMENT TRAINING: CPT

## 2018-10-26 PROCEDURE — 80053 COMPREHEN METABOLIC PANEL: CPT

## 2018-10-26 PROCEDURE — 99233 SBSQ HOSP IP/OBS HIGH 50: CPT | Mod: ,,, | Performed by: HOSPITALIST

## 2018-10-26 PROCEDURE — 63600175 PHARM REV CODE 636 W HCPCS: Performed by: PHYSICIAN ASSISTANT

## 2018-10-26 PROCEDURE — 85025 COMPLETE CBC W/AUTO DIFF WBC: CPT

## 2018-10-26 PROCEDURE — 11000001 HC ACUTE MED/SURG PRIVATE ROOM

## 2018-10-26 PROCEDURE — 36415 COLL VENOUS BLD VENIPUNCTURE: CPT

## 2018-10-26 PROCEDURE — 84100 ASSAY OF PHOSPHORUS: CPT

## 2018-10-26 RX ORDER — DEXAMETHASONE 4 MG/1
4 TABLET ORAL EVERY 6 HOURS
Qty: 120 TABLET | Refills: 0 | Status: SHIPPED | OUTPATIENT
Start: 2018-10-26

## 2018-10-26 RX ORDER — INSULIN ASPART 100 [IU]/ML
5 INJECTION, SOLUTION INTRAVENOUS; SUBCUTANEOUS ONCE
Status: DISCONTINUED | OUTPATIENT
Start: 2018-10-26 | End: 2018-10-29

## 2018-10-26 RX ORDER — ATORVASTATIN CALCIUM 80 MG/1
80 TABLET, FILM COATED ORAL DAILY
Qty: 30 TABLET | Refills: 3 | Status: SHIPPED | OUTPATIENT
Start: 2018-10-26 | End: 2019-10-26

## 2018-10-26 RX ORDER — LANCETS 28 GAUGE
EACH MISCELLANEOUS
Qty: 100 EACH | Refills: 3 | Status: SHIPPED | OUTPATIENT
Start: 2018-10-26

## 2018-10-26 RX ORDER — INSULIN LISPRO 100 [IU]/ML
INJECTION, SOLUTION INTRAVENOUS; SUBCUTANEOUS
Qty: 15 ML | Refills: 0 | Status: SHIPPED | OUTPATIENT
Start: 2018-10-26 | End: 2018-11-02 | Stop reason: SDUPTHER

## 2018-10-26 RX ORDER — PANTOPRAZOLE SODIUM 40 MG/1
40 TABLET, DELAYED RELEASE ORAL DAILY
Qty: 30 TABLET | Refills: 0 | Status: SHIPPED | OUTPATIENT
Start: 2018-10-27 | End: 2019-10-27

## 2018-10-26 RX ORDER — HEPARIN SODIUM 5000 [USP'U]/ML
5000 INJECTION, SOLUTION INTRAVENOUS; SUBCUTANEOUS EVERY 8 HOURS
Status: DISCONTINUED | OUTPATIENT
Start: 2018-10-26 | End: 2018-11-04 | Stop reason: HOSPADM

## 2018-10-26 RX ORDER — INSULIN ASPART 100 [IU]/ML
INJECTION, SOLUTION INTRAVENOUS; SUBCUTANEOUS
Qty: 15 ML | Refills: 0 | Status: SHIPPED | OUTPATIENT
Start: 2018-10-26 | End: 2018-10-26 | Stop reason: HOSPADM

## 2018-10-26 RX ORDER — DEXAMETHASONE 1 MG/1
4 TABLET ORAL EVERY 6 HOURS
Status: DISCONTINUED | OUTPATIENT
Start: 2018-10-26 | End: 2018-10-30

## 2018-10-26 RX ORDER — FINASTERIDE 5 MG/1
5 TABLET, FILM COATED ORAL DAILY
Qty: 30 TABLET | Refills: 3 | Status: SHIPPED | OUTPATIENT
Start: 2018-10-26 | End: 2019-10-26

## 2018-10-26 RX ORDER — LEVETIRACETAM 500 MG/1
500 TABLET ORAL 2 TIMES DAILY
Qty: 60 TABLET | Refills: 0 | Status: SHIPPED | OUTPATIENT
Start: 2018-10-26 | End: 2019-10-26

## 2018-10-26 RX ORDER — INSULIN ASPART 100 [IU]/ML
0-5 INJECTION, SOLUTION INTRAVENOUS; SUBCUTANEOUS
Status: DISCONTINUED | OUTPATIENT
Start: 2018-10-26 | End: 2018-11-04 | Stop reason: HOSPADM

## 2018-10-26 RX ORDER — ASPIRIN 81 MG/1
81 TABLET ORAL DAILY
Refills: 0 | COMMUNITY
Start: 2018-10-27 | End: 2019-10-27

## 2018-10-26 RX ORDER — INSULIN PUMP SYRINGE, 3 ML
EACH MISCELLANEOUS
Qty: 1 EACH | Refills: 0 | Status: SHIPPED | OUTPATIENT
Start: 2018-10-26 | End: 2019-10-26

## 2018-10-26 RX ORDER — CARVEDILOL 6.25 MG/1
6.25 TABLET ORAL 2 TIMES DAILY
Status: DISCONTINUED | OUTPATIENT
Start: 2018-10-26 | End: 2018-10-26

## 2018-10-26 RX ORDER — TIOTROPIUM BROMIDE 18 UG/1
1 CAPSULE ORAL; RESPIRATORY (INHALATION) DAILY
Qty: 30 CAPSULE | Refills: 3 | Status: SHIPPED | OUTPATIENT
Start: 2018-10-27

## 2018-10-26 RX ORDER — TAMSULOSIN HYDROCHLORIDE 0.4 MG/1
0.4 CAPSULE ORAL DAILY
Qty: 30 CAPSULE | Refills: 3 | Status: SHIPPED | OUTPATIENT
Start: 2018-10-26 | End: 2019-01-24

## 2018-10-26 RX ORDER — PEN NEEDLE, DIABETIC 30 GX3/16"
NEEDLE, DISPOSABLE MISCELLANEOUS
Qty: 100 EACH | Refills: 2 | Status: SHIPPED | OUTPATIENT
Start: 2018-10-26

## 2018-10-26 RX ADMIN — LEVETIRACETAM 500 MG: 500 TABLET ORAL at 09:10

## 2018-10-26 RX ADMIN — HEPARIN SODIUM 5000 UNITS: 5000 INJECTION, SOLUTION INTRAVENOUS; SUBCUTANEOUS at 09:10

## 2018-10-26 RX ADMIN — DEXAMETHASONE 4 MG: 1 TABLET ORAL at 06:10

## 2018-10-26 RX ADMIN — FINASTERIDE 5 MG: 5 TABLET, FILM COATED ORAL at 09:10

## 2018-10-26 RX ADMIN — NICOTINE 1 PATCH: 14 PATCH, EXTENDED RELEASE TRANSDERMAL at 09:10

## 2018-10-26 RX ADMIN — TAMSULOSIN HYDROCHLORIDE 0.4 MG: 0.4 CAPSULE ORAL at 09:10

## 2018-10-26 RX ADMIN — HEPARIN SODIUM 5000 UNITS: 5000 INJECTION, SOLUTION INTRAVENOUS; SUBCUTANEOUS at 02:10

## 2018-10-26 RX ADMIN — PANTOPRAZOLE SODIUM 40 MG: 40 TABLET, DELAYED RELEASE ORAL at 09:10

## 2018-10-26 RX ADMIN — ATORVASTATIN CALCIUM 80 MG: 20 TABLET, FILM COATED ORAL at 09:10

## 2018-10-26 RX ADMIN — DEXAMETHASONE SODIUM PHOSPHATE 4 MG: 4 INJECTION, SOLUTION INTRAMUSCULAR; INTRAVENOUS at 12:10

## 2018-10-26 RX ADMIN — DEXAMETHASONE 4 MG: 1 TABLET ORAL at 11:10

## 2018-10-26 RX ADMIN — DEXAMETHASONE SODIUM PHOSPHATE 4 MG: 4 INJECTION, SOLUTION INTRAMUSCULAR; INTRAVENOUS at 05:10

## 2018-10-26 RX ADMIN — TIOTROPIUM BROMIDE 18 MCG: 18 CAPSULE ORAL; RESPIRATORY (INHALATION) at 09:10

## 2018-10-26 RX ADMIN — ASPIRIN 81 MG: 81 TABLET, COATED ORAL at 09:10

## 2018-10-26 NOTE — PLAN OF CARE
Problem: Occupational Therapy Goal  Goal: Occupational Therapy Goal  Goals to be met by: 11/20     Patient will increase functional independence with ADLs by performing:    UE Dressing with Set-up Assistance.  LE Dressing with Contact Guard Assistance.  Grooming while seated with Set-up Assistance.  Toileting from toilet with Contact Guard Assistance for hygiene and clothing management.     Outcome: Ongoing (interventions implemented as appropriate)  Evaluation complete and goals set.  Cont with POC  Dahiana Ward OT  10/26/2018

## 2018-10-26 NOTE — PLAN OF CARE
Spoke with pt in person/wife on phone--they want [place] area rehab/snf.  [facility] notified that they want [place] area placement.     10/26/18 1529   Right Care Assessment   Can the patient answer the patient profile reliably? Yes, cognitively intact   How often would a person be available to care for the patient? Often   Describe the patient's ability to walk at the present time. Major restrictions/daily assistance from another person   How does the patient rate their overall health at the present time? Fair   Number of comorbid conditions (as recorded on the chart) Three   During the past month, has the patient often been bothered by feeling down, depressed or hopeless? No   During the past month, has the patient often been bothered by little interest or pleasure in doing things? No

## 2018-10-26 NOTE — ASSESSMENT & PLAN NOTE
Mr. Tran is a 67yoM with PMHx HTN, CAD s/p CABG, carotid artery disease s/p stenting, BPH, who presented to [facility] with AMS and found to have left frontal brain mass, likely metastasis    -No acute neurosurgical intervention indicated  -Radiosurgery vs. Resection of tumor, will round with staff  -q4h neuro checks  -Continue dex for cerebral edema, changed to 4mg q6h  -PPI while on dex  -SBP <160  -Continue Keppra 500mg for seizure prophylaxis   -Pulm lesion - s/p bronch with pulm, cytology & pathology results in process    Discussed with Dr. De Jesus

## 2018-10-26 NOTE — PLAN OF CARE
Problem: Patient Care Overview  Goal: Plan of Care Review  Outcome: Ongoing (interventions implemented as appropriate)   10/26/18 0239   Coping/Psychosocial   Plan Of Care Reviewed With patient       Problem: Fall Risk (Adult)  Goal: Absence of Falls  Patient will demonstrate the desired outcomes by discharge/transition of care.  Outcome: Ongoing (interventions implemented as appropriate)   10/26/18 0239   Fall Risk (Adult)   Absence of Falls making progress toward outcome       Problem: Pressure Ulcer Risk (Avel Scale) (Adult,Obstetrics,Pediatric)  Goal: Skin Integrity  Patient will demonstrate the desired outcomes by discharge/transition of care.  Outcome: Ongoing (interventions implemented as appropriate)   10/26/18 0239   Pressure Ulcer Risk (Avel Scale) (Adult,Obstetrics,Pediatric)   Skin Integrity making progress toward outcome

## 2018-10-26 NOTE — PLAN OF CARE
Problem: Physical Therapy Goal  Goal: Physical Therapy Goal  Goals to be met by: 18     Patient will increase functional independence with mobility by performin. Sit to stand transfer with Standby Assistance  2. Gait  x 50 feet with Contact Guard Assistance using Rolling Walker.   3. Ascend/descend 6 stair with bilateral Handrails Minimal Assistance using no AD.   4. Lower extremity exercise program x15 reps per handout, with supervision     Outcome: Ongoing (interventions implemented as appropriate)  PT evaluation completed, pt educated on role of PT, POC    Dariana Mijares, SPT  10/26/18

## 2018-10-26 NOTE — NURSING
Contacted Dr. Shipman.  Alerted provider that pt's family is at bedside.    Pre lunch .  Pt scheduled to receive one time 5 units aspart insulin.  Per provider, cancel med.

## 2018-10-26 NOTE — PLAN OF CARE
October 26, 2018             [facility]  [address]  [place] [place]  [address]  Phone: [phone]  Fax: [phone] October 26, 2018     Maria L Medina            To Whom It May Concern:    Ms. Maria L Medina missed work/school on  10/26/2018 being at bedside as a family member. She may return to school on 10/27/2018 .  If you have any questions or concerns, please don't hesitate to call [facility] office at [phone].      Sincerely,        Deborah Pham MD  Internal Medicine      Department of [facility]

## 2018-10-26 NOTE — PT/OT/SLP EVAL
"Physical Therapy Evaluation    Patient Name:  Lorenzo Tran Sr.   MRN:  [id number]    Recommendations:     Discharge Recommendations:  nursing facility, skilled(Simultaneous filing. User may not have seen previous data.)   Discharge Equipment Recommendations: walker, rolling   Barriers to discharge: Inaccessible home    Assessment:     Lorenzo Tran Sr. is a 67 y.o. male admitted with a medical diagnosis of Brain metastasis.  He presents with the following impairments/functional limitations:  weakness, gait instability, impaired functional mobilty, impaired cognition, impaired balance, impaired self care skills, decreased lower extremity function. Pt demonstrates mild difficulty following 1 step commands, R sided weakness, and decreased steadiness and distance abilities c/ambulation. Prior to admit, he required no assistance to ambulate community distances. He would benefit from intensive interdisciplinary therapy to address his recent onset of unilateral weakness and safety concerns. Patient would also benefit from acute skilled PT services to address these deficits and reach maximum level of function.        Rehab Prognosis:  fair; patient would benefit from acute skilled PT services to address these deficits and reach maximum level of function.      Recent Surgery: * No surgery found *      Plan:     During this hospitalization, patient to be seen 3 x/week(Simultaneous filing. User may not have seen previous data.) to address the above listed problems via gait training, therapeutic activities, therapeutic exercises, neuromuscular re-education  · Plan of Care Expires:  11/25/18   Plan of Care Reviewed with: patient    Subjective     Communicated with nsg prior to session.  Patient found supine upon PT entry to room, agreeable to evaluation.      Chief Complaint: none stated  Patient comments/goals: "I'm sorry that I'm grumpy"  Pain/Comfort:  Pain Rating 1: other (see comments)(NPRS not " "obtained)  Location - Side 1: Right  Location - Orientation 1: generalized  Location 1: shoulder  Pain Addressed 1: Distraction    Patients cultural, spiritual, Amish conflicts given the current situation: none stated    Living Environment:  Pt lives c/wife in mobile home c/6 steps to enter c/bilateral rails.  Prior to admission, patients level of function was independent c/ambulation at home and in the community and did not use an AD. He is retired, able to drive, and requires no assistance at home. He reports multiple recent falls that he attributes to dizziness and bilateral leg weakness. Patient has the following equipment: none. Upon discharge, patient will have assistance from wife, whose mobility status is unknown.    Objective:     Patient found with: peripheral IV     General Precautions: Standard, fall   Orthopedic Precautions:N/A   Braces: N/A     Exams:  · Cognitive Exam:  Patient is oriented to Person, Place and Situation  · RLE ROM: WFL  · RLE Strength: *scores may not accurately reflect pts strength 2/2 pts impaired ability to follow commands.  · Hip flexion: 4/5  · Hip abduction: 4/5  · Knee flexion: 3+/5  · Knee extension: 4/5  · Ankle dorsiflexion: 3+/5  · Ankle plantarflexion: 4/5  · LLE ROM: WFL  · LLE Strength:   · Hip flexion: 4/5  · Hip abduction: 4/5  · Knee flexion: 4/5  · Knee extension: 5/5  · Ankle dorsiflexion: 5/5  · Ankle plantarflexion: 5/5    Functional Mobility:  · Bed Mobility:     · Supine to Sit: stand by assistance  · Sit to Supine: stand by assistance  · Transfers:     · Sit to Stand:  minimum assistance with no AD x1 trial; minimum assistance with RW x1 trial  · Gait: Pt ambulated 12 feet c/RW and minimum assistance. He demonstrated decreased step length, decreased pedrito, rounded shoulders, increased reliance on RW to maintain balance.    Balance:   · Dynamic standing balance: Pt stood for approx 3" c/RW and CGA for cleaning of perianal area. He demonstrated adequate " balance c/1 UE for support to perform cleaning independently, requiring assistance to don/doff diaper.     AM-PAC 6 CLICK MOBILITY  Total Score:17       Therapeutic Activities and Exercises:   PT evaluation completed, pt educated on role of PT, POC    Pt safe to ambulate in room with RW and 1 person assist.    Patient left supine with all lines intact and call button in reach.    GOALS:   Multidisciplinary Problems     Physical Therapy Goals        Problem: Physical Therapy Goal    Goal Priority Disciplines Outcome Goal Variances Interventions   Physical Therapy Goal     PT, PT/OT Ongoing (interventions implemented as appropriate)     Description:  Goals to be met by: 18     Patient will increase functional independence with mobility by performin. Sit to stand transfer with Standby Assistance  2. Gait  x 50 feet with Contact Guard Assistance using Rolling Walker.   3. Ascend/descend 6 stair with bilateral Handrails Minimal Assistance using no AD.   4. Lower extremity exercise program x15 reps per handout, with supervision                       History:     Past Medical History:   Diagnosis Date    Hypertension     PSA elevation     Vitamin D insufficiency        Past Surgical History:   Procedure Laterality Date    CARDIAC SURGERY      had stents put in neck    CAROTID STENT Bilateral     CORONARY ARTERY BYPASS GRAFT      right heel      right hip      right leg       stents put in both legs         Clinical Decision Making:     History  Co-morbidities and personal factors that may impact the plan of care Examination  Body Structures and Functions, activity limitations and participation restrictions that may impact the plan of care Clinical Presentation   Decision Making/ Complexity Score   Co-morbidities:   [] Time since onset of injury / illness / exacerbation  [x] Status of current condition  [x]Patient's cognitive status and safety concerns    [] Multiple Medical Problems (see med  hx)  Personal Factors:   [] Patient's age  [] Prior Level of function   [] Patient's home situation (environment and family support)  [] Patient's level of motivation  [x] Expected progression of patient      HISTORY:(criteria)    [] 07688 - no personal factors/history    [] 53596 - has 1-2 personal factor/comorbidity     [x] 67828 - has >3 personal factor/comorbidity     Body Regions:  [] Objective examination findings  [] Head     []  Neck  [] Trunk   [] Upper Extremity  [x] Lower Extremity    Body Systems:  [x] For communication ability, affect, cognition, language, and learning style: the assessment of the ability to make needs known, consciousness, orientation (person, place, and time), expected emotional /behavioral responses, and learning preferences (eg, learning barriers, education  needs)  [x] For the neuromuscular system: a general assessment of gross coordinated movement (eg, balance, gait, locomotion, transfers, and transitions) and motor function  (motor control and motor learning)  [] For the musculoskeletal system: the assessment of gross symmetry, gross range of motion, gross strength, height, and weight  [] For the integumentary system: the assessment of pliability(texture), presence of scar formation, skin color, and skin integrity  [] For cardiovascular/pulmonary system: the assessment of heart rate, respiratory rate, blood pressure, and edema     Activity limitations:    [x] Patient's cognitive status and saf ety concerns          [] Status of current condition      [] Weight bearing restriction  [] Cardiopulmunary Restriction    Participation Restrictions:   [] Goals and goal agreement with the patient     [] Rehab potential (prognosis) and probable outcome      Examination of Body System: (criteria)    [] 72870 - addressing 1-2 elements    [] 24442 - addressing a total of 3 or more elements     [x] 44222 -  Addressing a total of 4 or more elements         Clinical Presentation: (criteria)   Evolving - 10565     On examination of body system using standardized tests and measures patient presents with 4 or more elements from any of the following: body structures and functions, activity limitations, and/or participation restrictions.  Leading to a clinical presentation that is considered evolving with changing characteristics                              Clinical Decision Making  (Eval Complexity):  Moderate - 11698     Time Tracking:     PT Received On: 10/26/18  PT Start Time: 0750     PT Stop Time: 0817  PT Total Time (min): 27 min     Billable Minutes: Evaluation 17 min and Therapeutic Activity 10 min      Dariana Mijares, SPT  10/26/2018

## 2018-10-26 NOTE — PROGRESS NOTES
[facility]  Progress Note    Patient Name: Lorenzo Tran Sr.  MRN: [id number]  Patient Class: IP- Inpatient   Admission Date: 10/23/2018  Length of Stay: 3 days  Attending Physician: Berenice Shipman MD  Primary Care Provider: Padmini Dailey [facility] Medicine Team: [facility] 1 YUNIOR Wu    Subjective:     Principal Problem:Brain metastasis    HPI:  66 yo M w/ PMH of HTN, CAD s/p CABG, carotid artery disease s/p stenting, and elevated PSA who presents as transfer for evaluation of brain lesion. Patient was brought to [facility] ED by his family w/ concern of altered mental status and generalized weakness. Family was concerned that he was not acting like himself. Patient shares that yesterday he attempted to take a bath and was unable to lift himself out of the tub. He waited 4 hours until a family member called EMS. He endorses chronic RLE weakness secondary to work related incident approximately 30 years ago. He also endorses new RUE weakness. On evaluation in the ED, he was noted to have very mild R facial droop. CT head demonstrated vasogenic edema w/ 1 cm nodular lesion in the L frontal lobe. CXR was significant for mass like consolidations in the L lung. Complains of chronic cough w/ occasional sputum. Shares that he had 1 episode of hemoptysis 2-3 months ago. Patient has no previous lung history but endorses a long history of smoking cigars. Reports that he would smoke a pack of cigars a day. In the ED prior to transfer he received decadron 10mg. No seizure like activity noted and no previous seizure history. Reports shortness of breath and urinary symptoms. Denies fever, chills, chest pain, and abdominal pain.       Hospital Course:  10/25 underwent bronchoscopy, biopsy was taken from the his lung mass, MRI brain showed frontal mass with vasogenic edema, abdominal CT showed multiple liver mass concerning for met, neurosurgery consulted will  involve hem/och tomorrow.   10/26 No events overnight, u/s testicles showed right sided hydrocele,will follow with urology out patient, biopsy still in process, no acute interventions by neurosurgery, hem/onc following and developing treatment plan.    Interval History: No acute events overnight     Review of Systems   Constitutional: Negative for chills and fever.   HENT: Positive for congestion and trouble swallowing.    Eyes: Negative for visual disturbance.   Respiratory: Positive for cough and shortness of breath.    Cardiovascular: Positive for chest pain (From R chest across to L). Negative for leg swelling.   Gastrointestinal: Negative for abdominal pain, nausea and vomiting.   Genitourinary: Positive for difficulty urinating, dysuria, frequency (rt side ), hematuria and scrotal swelling.   Skin: Negative for rash.   Neurological: Positive for weakness. Negative for dizziness, numbness and headaches.   Psychiatric/Behavioral: Positive for confusion.     Objective:     Vital Signs (Most Recent):  Temp: 97.5 °F (36.4 °C) (10/26/18 1218)  Pulse: (!) 57 (10/26/18 1218)  Resp: 17 (10/26/18 1218)  BP: (!) 118/58 (10/26/18 1218)  SpO2: 97 % (10/26/18 1218) Vital Signs (24h Range):  Temp:  [96.1 °F (35.6 °C)-97.6 °F (36.4 °C)] 97.5 °F (36.4 °C)  Pulse:  [47-65] 57  Resp:  [16-20] 17  SpO2:  [94 %-99 %] 97 %  BP: (102-155)/(58-70) 118/58     Weight: 74.8 kg (165 lb 0 oz)  Body mass index is 25.84 kg/m².    Intake/Output Summary (Last 24 hours) at 10/26/2018 1517  Last data filed at 10/26/2018 0400  Gross per 24 hour   Intake 2100 ml   Output --   Net 2100 ml      Physical Exam   Constitutional: He is oriented to person, place, and time. He appears well-developed and well-nourished. No distress.   HENT:   Head: Normocephalic and atraumatic.   Mouth/Throat: Oropharynx is clear and moist. No oropharyngeal exudate.   Eyes: Conjunctivae and EOM are normal.   Neck: Normal range of motion. Neck supple.   Cardiovascular:  Normal rate, regular rhythm, normal heart sounds and intact distal pulses.   Pulmonary/Chest: Effort normal and breath sounds normal. No respiratory distress.   Abdominal: Soft. Bowel sounds are normal. There is no tenderness.   Genitourinary:   Genitourinary Comments: Right sided scrotal swelling, soft, non tender, prostate exam -ve for hard masses or tenderness    Musculoskeletal: Normal range of motion. He exhibits no edema.   Neurological: He is alert and oriented to person, place, and time.   AAOx3  Mild expressive aphasia(improving)  Symmetric smile; no gross CN deficits  Strength 5/5 on L, 4/5 RUE, 3/5 RLE  No sensory deficits noted   Skin: Skin is warm and dry.   Vitals reviewed.      Significant Labs:   CBC:   Recent Labs   Lab 10/25/18  0457 10/26/18  0422   WBC 9.16 10.74   HGB 11.9* 12.8*   HCT 36.9* 40.9    283     CMP:   Recent Labs   Lab 10/25/18  0457 10/26/18  0422   * 137   K 4.0 4.0    104   CO2 23 23   * 85   BUN 19 17   CREATININE 0.7 0.7   CALCIUM 8.6* 8.7   PROT 6.6 6.6   ALBUMIN 2.2* 2.3*   BILITOT 0.3 0.3   ALKPHOS 59 71   AST 10 17   ALT 7* 15   ANIONGAP 8 10   EGFRNONAA >60.0 >60.0       Significant Imaging: I have reviewed all pertinent imaging results/findings within the past 24 hours.    Assessment/Plan:      * Brain metastasis    Lung mass  Vasogenic cerebral edema  68 yo M w/ PMH of HTN, CAD s/p CABG, carotid artery disease s/p stenting, and elevated PSA who presents as transfer for evaluation of brain lesion w/ newly found mass-like consolidations on CXR. Concern for primary lung lesion w/ significant smoking history and brain metastasis w/ vasogenic edema. Mild expressive aphasia, R>L weakness, difficulty w/ mobility. Hypertensive concerning for Cushing reflex secondary to cerebral edema. HR wnl.   - IV decadron 4mg q6  - keppra 500 mg BID  - neurochecks q4  - CT Neck/chest/abdomen/pelvis w/ contrast showed b/l lung mass, liver masses and  MRI brain showed  frontal brain mass  - underwent bronch biopsy   - neurosurgery following appreciate their recs   - hem/onc following  - follow biopsy results .     Discharge planning issues    PT/OT recommended SNF        Testicle swelling    Unsure duration  - patient reported it was noticed by his PCP 6 month ago  - denies pain, reported difficulty urination, dysuria and dripping   - right testicular swelling, soft,non tender. Soft non tender prostate on exam.  - continue Flomax and finasteride  - U/s testicles consistent with right testicles hydrocele   - will arrange urology appointment upon discharge      Lab Results   Component Value Date    PSA 2.2 10/25/2018    PSA 6.9 (H) 05/18/2017              Lung mass           Chest pain    - Described as sharp, radiating from his R chest across to the L, non-reproducible  - Reports that this is a chronic occurrence, comes and goes   - Concern for MSK vs ACS vs PE vs likely cancerous masses on CXR  - Obtained EKG, negative for STEMI; troponin pending  - resumed aspirin 81 mg  --- resolved      Essential hypertension    - stable  - currently normotensive  - resume coreg 12.5 mg BID  - possibly secondary to cushing reflex, will continue to monitor  - SBP <200       MCCRAY (dyspnea on exertion)    - Chronic likely secondary to COPD given CXR findings and smoking history  - Not on O2 at home  - SpO2 mid 90s on RA  - duonebs PRN  - begin tiotropium daily       Elevated PSA    - hx of elevated PSA  - nursing reports of urinary incontinence, dribbling, weak stream  - resume finasteride, tamsulosin       Coronary artery disease involving native heart    Continue coreg, Asprin ant atorvastatin          Tobacco abuse    - acknowledges need to quit  - would benefit from smoking cessation counseling  - nicotine patch.          VTE Risk Mitigation (From admission, onward)        Ordered     heparin (porcine) injection 5,000 Units  Every 8 hours      10/26/18 0915     IP VTE HIGH RISK PATIENT  Once       10/24/18 0342              YUNIOR Wu  Department of Hospital Medicine   [facility]                    10/26/2018                             STAFF PHYSICIAN NOTE                                   Attending Attestation for Rounds with Resident  I have reviewed and concur with the resident's history, physical, assessment, and plan.  I have personally interviewed and examined the patient at bedside and agree with the resident's findings.                                  ________________________________________                                     REASON FOR ADMISSION:     Patient is 67 y.o.male    Body mass index is 25.84 kg/m².,  Brain metastasis

## 2018-10-26 NOTE — HPI
66 yo M w/ PMH of HTN, CAD s/p CABG, carotid artery disease s/p stenting, and elevated PSA who presents as transfer for evaluation of brain lesion. Patient was brought to [facility] ED by his family w/ concern of altered mental status and generalized weakness. Family was concerned that he was not acting like himself. Patient shares that yesterday he attempted to take a bath and was unable to lift himself out of the tub. He waited 4 hours until a family member called EMS. He endorses chronic RLE weakness secondary to work related incident approximately 30 years ago. He also endorses new RUE weakness. On evaluation in the ED, he was noted to have very mild R facial droop. CT head demonstrated vasogenic edema w/ 1 cm nodular lesion in the L frontal lobe. CXR was significant for mass like consolidations in the L lung. Complains of chronic cough w/ occasional sputum. Shares that he had 1 episode of hemoptysis 2-3 months ago. Patient has no previous lung history but endorses a long history of smoking cigars. Reports that he would smoke a pack of cigars a day. In the ED prior to transfer he received decadron 10mg. No seizure like activity noted and no previous seizure history. Reports shortness of breath and urinary symptoms. Denies fever, chills, chest pain, and abdominal pain.  Heme onc consulted for suspected primary lung cancer with metastases to brain.

## 2018-10-26 NOTE — PLAN OF CARE
Problem: Patient Care Overview  Goal: Plan of Care Review  Outcome: Ongoing (interventions implemented as appropriate)  Pt disoriented to place, time, situation. Pt bradycardic, all other VSS on room air.  Pt denies pain.  BG monitored d/t steroid therapy.  Dexamethasone transitioned from IV to PO.  Condom cath applied for urinary incontinence.  Urine culture sent to lab.     Fall precautions in place.  Bed alarm set, call bell within reach, frequent rounding performed.  Will continue to monitor pt.

## 2018-10-26 NOTE — ASSESSMENT & PLAN NOTE
Mr. Tran is a 67yoM with PMHx HTN, CAD s/p CABG, carotid artery disease s/p stenting, BPH, who presented to [facility] with AMS and found to have left frontal brain mass, likely metastasis    -No acute neurosurgical intervention indicated  -Will plan for outpatient radiosurgery. Appts will be set up and communicated with patient and family.   -q4h neuro checks  -Continue dex for cerebral edema, 4mg q6h  -PPI while on dex  -SBP <160  -Continue Keppra 500mg for seizure prophylaxis   -Pulm lesion - s/p bronch with pulm, suspecting NSCLC  -will continue to follow  -Discussed with Dr. De Jesus
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
## 2018-10-26 NOTE — PLAN OF CARE
"Problem: Patient Care Overview  Goal: Plan of Care Review  Outcome: Ongoing (interventions implemented as appropriate)  Pt disoriented to place, time, situation.  Neuro checks stable.  Pt c/o "drowsiness" at 1200 round, subsided by end of shift.  VSS on room air.  Pt denies pain.  Incontinent of urine and stool.  Urine culture and u/a pending collection.  Pt received bronchoscopy today.  US to scrotum/testicles completed.    Fall precautions in place.  Bed alarm set, call bell within reach, frequent rounding performed.          "
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
## 2018-10-26 NOTE — SUBJECTIVE & OBJECTIVE
Interval History: No acute events overnight     Review of Systems   Constitutional: Negative for chills and fever.   HENT: Positive for congestion and trouble swallowing.    Eyes: Negative for visual disturbance.   Respiratory: Positive for cough and shortness of breath.    Cardiovascular: Positive for chest pain (From R chest across to L). Negative for leg swelling.   Gastrointestinal: Negative for abdominal pain, nausea and vomiting.   Genitourinary: Positive for difficulty urinating, dysuria, frequency (rt side ), hematuria and scrotal swelling.   Skin: Negative for rash.   Neurological: Positive for weakness. Negative for dizziness, numbness and headaches.   Psychiatric/Behavioral: Positive for confusion.     Objective:     Vital Signs (Most Recent):  Temp: 97.5 °F (36.4 °C) (10/26/18 1218)  Pulse: (!) 57 (10/26/18 1218)  Resp: 17 (10/26/18 1218)  BP: (!) 118/58 (10/26/18 1218)  SpO2: 97 % (10/26/18 1218) Vital Signs (24h Range):  Temp:  [96.1 °F (35.6 °C)-97.6 °F (36.4 °C)] 97.5 °F (36.4 °C)  Pulse:  [47-65] 57  Resp:  [16-20] 17  SpO2:  [94 %-99 %] 97 %  BP: (102-155)/(58-70) 118/58     Weight: 74.8 kg (165 lb 0 oz)  Body mass index is 25.84 kg/m².    Intake/Output Summary (Last 24 hours) at 10/26/2018 1517  Last data filed at 10/26/2018 0400  Gross per 24 hour   Intake 2100 ml   Output --   Net 2100 ml      Physical Exam   Constitutional: He is oriented to person, place, and time. He appears well-developed and well-nourished. No distress.   HENT:   Head: Normocephalic and atraumatic.   Mouth/Throat: Oropharynx is clear and moist. No oropharyngeal exudate.   Eyes: Conjunctivae and EOM are normal.   Neck: Normal range of motion. Neck supple.   Cardiovascular: Normal rate, regular rhythm, normal heart sounds and intact distal pulses.   Pulmonary/Chest: Effort normal and breath sounds normal. No respiratory distress.   Abdominal: Soft. Bowel sounds are normal. There is no tenderness.   Genitourinary:    Genitourinary Comments: Right sided scrotal swelling, soft, non tender, prostate exam -ve for hard masses or tenderness    Musculoskeletal: Normal range of motion. He exhibits no edema.   Neurological: He is alert and oriented to person, place, and time.   AAOx3  Mild expressive aphasia(improving)  Symmetric smile; no gross CN deficits  Strength 5/5 on L, 4/5 RUE, 3/5 RLE  No sensory deficits noted   Skin: Skin is warm and dry.   Vitals reviewed.      Significant Labs:   CBC:   Recent Labs   Lab 10/25/18  0457 10/26/18  0422   WBC 9.16 10.74   HGB 11.9* 12.8*   HCT 36.9* 40.9    283     CMP:   Recent Labs   Lab 10/25/18  0457 10/26/18  0422   * 137   K 4.0 4.0    104   CO2 23 23   * 85   BUN 19 17   CREATININE 0.7 0.7   CALCIUM 8.6* 8.7   PROT 6.6 6.6   ALBUMIN 2.2* 2.3*   BILITOT 0.3 0.3   ALKPHOS 59 71   AST 10 17   ALT 7* 15   ANIONGAP 8 10   EGFRNONAA >60.0 >60.0       Significant Imaging: I have reviewed all pertinent imaging results/findings within the past 24 hours.

## 2018-10-26 NOTE — TELEPHONE ENCOUNTER
Called pt's daughter, Lillie. Discussed GSRS. She will be coming to hospital tomorrow. I told her I would leave info in her father's room.    Did as stated.

## 2018-10-26 NOTE — PT/OT/SLP EVAL
Occupational Therapy   Evaluation    Name: Lorenzo Tran Sr.  MRN: [id number]  Admitting Diagnosis:  Brain metastasis      Recommendations:     Discharge Recommendations: rehabilitation facility  Discharge Equipment Recommendations:  walker, rolling, cane, straight  Barriers to discharge:  Inaccessible home environment, Decreased caregiver support    History:     Occupational Profile:  Living Environment: Pt lives with wife in trailer with 6 steps to enter   Previous level of function: Pt was independent with self care prior to admission per pt   Equipment Used at Home:  none  Assistance upon Discharge: family with limited assistance after DC     Past Medical History:   Diagnosis Date    Hypertension     PSA elevation     Vitamin D insufficiency        Past Surgical History:   Procedure Laterality Date    CARDIAC SURGERY      had stents put in neck    CAROTID STENT Bilateral     CORONARY ARTERY BYPASS GRAFT      right heel      right hip      right leg       stents put in both legs         Subjective     Chief Complaint: poor activity tolerance   Patient/Family Comments/goals: return to home     Pain/Comfort:  · Location - Side 1: Right  · Location - Orientation 1: generalized  · Location 1: shoulder  · Pain Addressed 1: Distraction    Patients cultural, spiritual, Restoration conflicts given the current situation: none stated    Objective:     Communicated with: RN prior to session.  Patient found all lines intact and peripheral IV upon OT entry to room.    General Precautions: Standard, fall   Orthopedic Precautions:N/A   Braces: N/A     Occupational Performance:    Bed Mobility:    · Pt with min a for safe bed mobility     Functional Mobility/Transfers:  · Functional Mobility: Pt with poor mobility and requires Min A for safe performance     Activities of Daily Living:  · Pt with min A for safe self care completion     Cognitive/Visual Perceptual:  Cognitive/Psychosocial Skills:     -       Oriented  "to: Person and Place   -       Follows Commands/attention:Easily distracted and Follows one-step commands  -       Communication: clear/fluent  -       Memory:    -       Safety awareness/insight to disability: impaired   -       Mood/Affect/Coping skills/emotional control: Anxious and labile    Physical Exam:  Upper Extremity Range of Motion:     -       Right Upper Extremity: arthritis pain inhibits    -       Left Upper Extremity: WFL      Upper Extremity Strength:    -       Right Upper Extremity: 3/5  -       Left Upper Extremity: WFL      AMPAC 6 Click ADL:  AMPAC Total Score: 19    Treatment & Education:  Evaluation complete and goals set.  Pt educated on safety, role of OT, importance of increased participation in self care for gains , expectations for participation, expectations for gains, POC, energy conservation, caregiver strain. White board updated.   - toielting with min A for safety  - bed mobility min A for safety   Education:    Patient left supine with all lines intact and call button in reach    Assessment:     Lorenzo Tran . is a 67 y.o. male with a medical diagnosis of Brain metastasis. Pt with slow performance and requires verbal cues for safe participation.  Pleasant but will lash out unexpectedly then tearfully apologetic.  Educated pt on effects of brain mets and staff understands deficits.  He presents with the following performance deficits affecting function: weakness, impaired endurance, impaired self care skills, impaired functional mobilty, impaired balance.      Rehab Prognosis: Fair; patient would benefit from acute skilled OT services to address these deficits and reach maximum level of function.         Clinical Decision Makin.  OT Low:  "Pt evaluation falls under low complexity for evaluation coding due to performance deficits noted in 1-3 areas as stated above and 0 co-morbities affecting current functional status. Data obtained from problem focused " "assessments. No modifications or assistance was required for completion of evaluation. Only brief occupational profile and history review completed."     Plan:     Patient to be seen 3 x/week to address the above listed problems via self-care/home management, therapeutic activities, therapeutic exercises  · Plan of Care Expires: 11/26/18  · Plan of Care Reviewed with: patient    This Plan of care has been discussed with the patient who was involved in its development and understands and is in agreement with the identified goals and treatment plan    GOALS:   Multidisciplinary Problems     Occupational Therapy Goals        Problem: Occupational Therapy Goal    Goal Priority Disciplines Outcome Interventions   Occupational Therapy Goal     OT, PT/OT Ongoing (interventions implemented as appropriate)    Description:  Goals to be met by: 11/20     Patient will increase functional independence with ADLs by performing:    UE Dressing with Set-up Assistance.  LE Dressing with Contact Guard Assistance.  Grooming while seated with Set-up Assistance.  Toileting from toilet with Contact Guard Assistance for hygiene and clothing management.                       Time Tracking:     OT Date of Treatment: 10/26/18  OT Start Time: 0745  OT Stop Time: 0810  OT Total Time (min): 25 min    Billable Minutes:Evaluation 10  Self Care/Home Management 15    Dahiana Ward, OT  10/26/2018    "

## 2018-10-26 NOTE — SUBJECTIVE & OBJECTIVE
Interval History: No complaints today. Resting comfortably in bed. Daughter/granddaughter at bedside. Daughter states she is unsure of how to take care of the patient at home should he be discharged.    Medications:  Continuous Infusions:  Scheduled Meds:   aspirin  81 mg Oral Daily    atorvastatin  80 mg Oral Daily    dexamethasone  4 mg Intravenous Q6H    finasteride  5 mg Oral Daily    heparin (porcine)  5,000 Units Subcutaneous Q8H    insulin aspart U-100  5 Units Subcutaneous Once    levETIRAcetam  500 mg Oral BID    nicotine  1 patch Transdermal Daily    pantoprazole  40 mg Oral Daily    tamsulosin  0.4 mg Oral Daily    tiotropium  1 capsule Inhalation Daily     PRN Meds:dextrose 50%, dextrose 50%, glucagon (human recombinant), glucose, glucose, hydrALAZINE, HYDROcodone-acetaminophen, insulin aspart U-100, sodium chloride 0.9%     Review of Systems  Objective:     Weight: 74.8 kg (165 lb 0 oz)  Body mass index is 25.84 kg/m².  Vital Signs (Most Recent):  Temp: 97.5 °F (36.4 °C) (10/26/18 1218)  Pulse: (!) 57 (10/26/18 1218)  Resp: 17 (10/26/18 1218)  BP: (!) 118/58 (10/26/18 1218)  SpO2: 97 % (10/26/18 1218) Vital Signs (24h Range):  Temp:  [96.1 °F (35.6 °C)-97.6 °F (36.4 °C)] 97.5 °F (36.4 °C)  Pulse:  [47-65] 57  Resp:  [16-20] 17  SpO2:  [94 %-99 %] 97 %  BP: (102-155)/(58-70) 118/58      Neurosurgery Physical Exam   General: well developed, well nourished, no distress  Head: normocephalic, atraumatic  Neurologic: Alert and oriented. Thought content appropriate  GCS: Motor: 6/Verbal: 4/Eyes: 4 GCS Total: 14  Mental Status: Awake, Alert, Oriented x 3- unaware of why he is in the hospital   Language: No aphasia  Speech: No dysarthria  Cranial nerves: slight right facial droop, tongue midline, CN II-XII grossly intact.   Eyes: pupils equal, round, reactive to light with accommodation, EOMI  Pulmonary: normal respirations, not labored, no accessory muscles used  Abdomen: soft, non-distended, not  tender to palpation  Sensory: intact to light touch throughout  Motor Strength: Moves all extremities spontaneously with good tone.   No abnormal movements seen.      Strength   Deltoids Triceps Biceps Wrist Extension Wrist Flexion Hand    Upper: R 4/5 4/5 4/5 4/5 4/5 4+/5     L 5/5 5/5 5/5 5/5 5/5 5/5       Iliopsoas Quadriceps Knee  Flexion Tibialis  anterior Gastro- cnemius EHL   Lower: R 4/5 4/5 4/5 4/5 4/5 4/5     L 5/5 5/5 5/5 5/5 5/5 5/5      Pronator Drift: no drift noted   Finger-to-nose: Intact bilaterally  Singh: absent  Clonus: absent  Babinski: absent  Pulses: 2+ and symmetric radial and dorsalis pedis  Skin: warm, dry and intact, no rashes      Significant Labs:  Recent Labs   Lab 10/25/18  0457 10/26/18  0422   * 85   * 137   K 4.0 4.0    104   CO2 23 23   BUN 19 17   CREATININE 0.7 0.7   CALCIUM 8.6* 8.7   MG 2.4 2.3     Recent Labs   Lab 10/25/18  0457 10/26/18  0422   WBC 9.16 10.74   HGB 11.9* 12.8*   HCT 36.9* 40.9    283     No results for input(s): LABPT, INR, APTT in the last 48 hours.  Microbiology Results (last 7 days)     Procedure Component Value Units Date/Time    Urine culture [[id number]] Collected:  10/26/18 1054    Order Status:  Sent Specimen:  Urine, Clean Catch Updated:  10/26/18 1054            Significant Diagnostics:  No new imaging for review

## 2018-10-26 NOTE — CONSULTS
Inpatient consult to Physical Medicine Rehab  Consult performed by: Chanell Amezquita NP  Consult ordered by: Deborah Pham MD  Reason for consult: assess rehab needs        Reviewed patient history and current admission.  Rehab team following.  Full consult to follow.    DAMIEN Collins, FNP-C  Physical Medicine & Rehabilitation   10/26/2018  Spectralink: [id number]

## 2018-10-26 NOTE — ASSESSMENT & PLAN NOTE
Unsure duration  - patient reported it was noticed by his PCP 6 month ago  - denies pain, reported difficulty urination, dysuria and dripping   - right testicular swelling, soft,non tender. Soft non tender prostate on exam.  - continue Flomax and finasteride  - U/s testicles consistent with right testicles hydrocele   - will arrange urology appointment upon discharge      Lab Results   Component Value Date    PSA 2.2 10/25/2018    PSA 6.9 (H) 05/18/2017

## 2018-10-26 NOTE — ASSESSMENT & PLAN NOTE
Lung mass  Vasogenic cerebral edema  66 yo M w/ PMH of HTN, CAD s/p CABG, carotid artery disease s/p stenting, and elevated PSA who presents as transfer for evaluation of brain lesion w/ newly found mass-like consolidations on CXR. Concern for primary lung lesion w/ significant smoking history and brain metastasis w/ vasogenic edema. Mild expressive aphasia, R>L weakness, difficulty w/ mobility. Hypertensive concerning for Cushing reflex secondary to cerebral edema. HR wnl.   - IV decadron 4mg q6  - keppra 500 mg BID  - neurochecks q4  - CT Neck/chest/abdomen/pelvis w/ contrast showed b/l lung mass, liver masses and  MRI brain showed frontal brain mass  - underwent bronch biopsy   - neurosurgery following appreciate their recs   - hem/onc following  - follow biopsy results .

## 2018-10-26 NOTE — ASSESSMENT & PLAN NOTE
66 yo man with pmhx significant for tobacco presenting with AMS and neurologic deficits found to have 2 brain lesions with chest mass, supraclavicular lymphadenopathy, and a 2.5cm liver lesion overall suggestive of primary lung cancer with metastases. Stage IV [id number], official pathology report pending.  -neurosurgery planning SRS versus minimally invasive  --SRS preferred if possible, per Dr. Muhammad  -continue dexamethasone per neurosurgery  -Treatment decisions pending molecular marker testing  -Lives in [place] and will arrange for outpatient follow up with Dr. Romero  --Contact information for Dr. Romero provided.

## 2018-10-26 NOTE — PROGRESS NOTES
ATTENDING NOTE, ONCOLOGY INPATIENT TEAM      Patient seen and examined, chart reviewed.  Full note to follow by consult fellow  Radiologic studies reviewed.   Labs reviewed.  mr ppears comfortable, in NAD.  Lungs are clear to auscultation.  Abdomen is soft, nontender.      RECOMMEND  Follow up on lung biopsy; I suspect this will be a non small cell lung carcinoma (NSCLC), most likely an adenocarcinoma.  If it is an adenocarcinoma, additional stains will need to be run and we will advise accordingly.  Discuss with the Neurosurgery Service whether they recommend stereotactic XRT versus a craniotomy.  We would favor stereotactic surgery, assuming this is a NSCLC.  We will follow with you.    Bello Sterling MD

## 2018-10-26 NOTE — PROGRESS NOTES
[facility]  Neurosurgery  Progress Note    Subjective:     History of Present Illness: Mr. Tran is a 67yoM with PMHx HTN, CAD s/p CABG, carotid artery disease s/p stent, who transferred from [facility] ED for altered mental status & generalized weakness, found to have a brain lesion on CTH.  He was transferred to [facility] for Nsurg evaluation.  History is obtained from patient & chart, as patient continues to have AMS.  Per chart, his family brought him in after he attempted to take a bath & was unable to get out of the bathtub and his family did not feel he was acting himself.  He reports baseline RLE weakness due to a work accident many years ago, but feels RUE weakness is new.  Patient does have a smoking history & was found to have lung mass, as well as liver lesions.  He is not on any antiplatelet or anticoagulant agents.  Unknown if prior stroke, no family available bedside.    Post-Op Info:  * No surgery found *         Interval History: No complaints today. Resting comfortably in bed. Daughter/granddaughter at bedside. Daughter states she is unsure of how to take care of the patient at home should he be discharged.    Medications:  Continuous Infusions:  Scheduled Meds:   aspirin  81 mg Oral Daily    atorvastatin  80 mg Oral Daily    dexamethasone  4 mg Intravenous Q6H    finasteride  5 mg Oral Daily    heparin (porcine)  5,000 Units Subcutaneous Q8H    insulin aspart U-100  5 Units Subcutaneous Once    levETIRAcetam  500 mg Oral BID    nicotine  1 patch Transdermal Daily    pantoprazole  40 mg Oral Daily    tamsulosin  0.4 mg Oral Daily    tiotropium  1 capsule Inhalation Daily     PRN Meds:dextrose 50%, dextrose 50%, glucagon (human recombinant), glucose, glucose, hydrALAZINE, HYDROcodone-acetaminophen, insulin aspart U-100, sodium chloride 0.9%     Review of Systems  Objective:     Weight: 74.8 kg (165 lb 0 oz)  Body mass index is 25.84 kg/m².  Vital Signs (Most  Recent):  Temp: 97.5 °F (36.4 °C) (10/26/18 1218)  Pulse: (!) 57 (10/26/18 1218)  Resp: 17 (10/26/18 1218)  BP: (!) 118/58 (10/26/18 1218)  SpO2: 97 % (10/26/18 1218) Vital Signs (24h Range):  Temp:  [96.1 °F (35.6 °C)-97.6 °F (36.4 °C)] 97.5 °F (36.4 °C)  Pulse:  [47-65] 57  Resp:  [16-20] 17  SpO2:  [94 %-99 %] 97 %  BP: (102-155)/(58-70) 118/58      Neurosurgery Physical Exam   General: well developed, well nourished, no distress  Head: normocephalic, atraumatic  Neurologic: Alert and oriented. Thought content appropriate  GCS: Motor: 6/Verbal: 4/Eyes: 4 GCS Total: 14  Mental Status: Awake, Alert, Oriented x 3- unaware of why he is in the hospital   Language: No aphasia  Speech: No dysarthria  Cranial nerves: slight right facial droop, tongue midline, CN II-XII grossly intact.   Eyes: pupils equal, round, reactive to light with accommodation, EOMI  Pulmonary: normal respirations, not labored, no accessory muscles used  Abdomen: soft, non-distended, not tender to palpation  Sensory: intact to light touch throughout  Motor Strength: Moves all extremities spontaneously with good tone.   No abnormal movements seen.      Strength   Deltoids Triceps Biceps Wrist Extension Wrist Flexion Hand    Upper: R 4/5 4/5 4/5 4/5 4/5 4+/5     L 5/5 5/5 5/5 5/5 5/5 5/5       Iliopsoas Quadriceps Knee  Flexion Tibialis  anterior Gastro- cnemius EHL   Lower: R 4/5 4/5 4/5 4/5 4/5 4/5     L 5/5 5/5 5/5 5/5 5/5 5/5      Pronator Drift: no drift noted   Finger-to-nose: Intact bilaterally  Singh: absent  Clonus: absent  Babinski: absent  Pulses: 2+ and symmetric radial and dorsalis pedis  Skin: warm, dry and intact, no rashes      Significant Labs:  Recent Labs   Lab 10/25/18  0457 10/26/18  0422   * 85   * 137   K 4.0 4.0    104   CO2 23 23   BUN 19 17   CREATININE 0.7 0.7   CALCIUM 8.6* 8.7   MG 2.4 2.3     Recent Labs   Lab 10/25/18  0457 10/26/18  0422   WBC 9.16 10.74   HGB 11.9* 12.8*   HCT 36.9* 40.9   PLT  257 283     No results for input(s): LABPT, INR, APTT in the last 48 hours.  Microbiology Results (last 7 days)     Procedure Component Value Units Date/Time    Urine culture [[id number]] Collected:  10/26/18 1054    Order Status:  Sent Specimen:  Urine, Clean Catch Updated:  10/26/18 1054            Significant Diagnostics:  No new imaging for review     Assessment/Plan:     * Brain metastasis    Mr. Tran is a 67yoM with PMHx HTN, CAD s/p CABG, carotid artery disease s/p stenting, BPH, who presented to [facility] with AMS and found to have left frontal brain mass, likely metastasis    -No acute neurosurgical intervention indicated  -Will plan for outpatient radiosurgery. Appts will be set up and communicated with patient and family.   -q4h neuro checks  -Continue dex for cerebral edema, changed to 4mg q6h  -PPI while on dex  -SBP <160  -Continue Keppra 500mg for seizure prophylaxis   -Pulm lesion - s/p bronch with pulm, cytology & pathology results in process  -will continue to follow    Discussed with Dr. Neal Holcomb PAAlejandroC  Neurosurgery  [facility]

## 2018-10-27 PROBLEM — E87.1 HYPONATREMIA: Status: ACTIVE | Noted: 2018-10-27

## 2018-10-27 LAB
ALBUMIN SERPL BCP-MCNC: 2.2 G/DL
ALP SERPL-CCNC: 64 U/L
ALT SERPL W/O P-5'-P-CCNC: 20 U/L
ANION GAP SERPL CALC-SCNC: 6 MMOL/L
AST SERPL-CCNC: 15 U/L
BACTERIA UR CULT: NO GROWTH
BASOPHILS # BLD AUTO: 0 K/UL
BASOPHILS NFR BLD: 0 %
BILIRUB SERPL-MCNC: 0.2 MG/DL
BUN SERPL-MCNC: 17 MG/DL
CALCIUM SERPL-MCNC: 8.3 MG/DL
CHLORIDE SERPL-SCNC: 103 MMOL/L
CO2 SERPL-SCNC: 24 MMOL/L
CREAT SERPL-MCNC: 0.7 MG/DL
DIFFERENTIAL METHOD: ABNORMAL
EOSINOPHIL # BLD AUTO: 0 K/UL
EOSINOPHIL NFR BLD: 0.4 %
ERYTHROCYTE [DISTWIDTH] IN BLOOD BY AUTOMATED COUNT: 13.6 %
EST. GFR  (AFRICAN AMERICAN): >60 ML/MIN/1.73 M^2
EST. GFR  (NON AFRICAN AMERICAN): >60 ML/MIN/1.73 M^2
GLUCOSE SERPL-MCNC: 108 MG/DL
HCT VFR BLD AUTO: 37.9 %
HGB BLD-MCNC: 11.8 G/DL
IMM GRANULOCYTES # BLD AUTO: 0.06 K/UL
IMM GRANULOCYTES NFR BLD AUTO: 0.6 %
LYMPHOCYTES # BLD AUTO: 1.2 K/UL
LYMPHOCYTES NFR BLD: 12.5 %
MAGNESIUM SERPL-MCNC: 2.1 MG/DL
MCH RBC QN AUTO: 26.4 PG
MCHC RBC AUTO-ENTMCNC: 31.1 G/DL
MCV RBC AUTO: 85 FL
MONOCYTES # BLD AUTO: 0.6 K/UL
MONOCYTES NFR BLD: 6.4 %
NEUTROPHILS # BLD AUTO: 7.6 K/UL
NEUTROPHILS NFR BLD: 80.1 %
NRBC BLD-RTO: 0 /100 WBC
PHOSPHATE SERPL-MCNC: 2.7 MG/DL
PLATELET # BLD AUTO: 247 K/UL
PMV BLD AUTO: 10.7 FL
POCT GLUCOSE: 102 MG/DL (ref 70–110)
POCT GLUCOSE: 110 MG/DL (ref 70–110)
POCT GLUCOSE: 112 MG/DL (ref 70–110)
POTASSIUM SERPL-SCNC: 3.8 MMOL/L
PROT SERPL-MCNC: 5.9 G/DL
RBC # BLD AUTO: 4.47 M/UL
SODIUM SERPL-SCNC: 133 MMOL/L
WBC # BLD AUTO: 9.46 K/UL

## 2018-10-27 PROCEDURE — 85025 COMPLETE CBC W/AUTO DIFF WBC: CPT

## 2018-10-27 PROCEDURE — 99233 SBSQ HOSP IP/OBS HIGH 50: CPT | Mod: ,,, | Performed by: HOSPITALIST

## 2018-10-27 PROCEDURE — 36415 COLL VENOUS BLD VENIPUNCTURE: CPT

## 2018-10-27 PROCEDURE — 25000003 PHARM REV CODE 250: Performed by: STUDENT IN AN ORGANIZED HEALTH CARE EDUCATION/TRAINING PROGRAM

## 2018-10-27 PROCEDURE — 84100 ASSAY OF PHOSPHORUS: CPT

## 2018-10-27 PROCEDURE — 63600175 PHARM REV CODE 636 W HCPCS: Performed by: STUDENT IN AN ORGANIZED HEALTH CARE EDUCATION/TRAINING PROGRAM

## 2018-10-27 PROCEDURE — 11000001 HC ACUTE MED/SURG PRIVATE ROOM

## 2018-10-27 PROCEDURE — 99232 SBSQ HOSP IP/OBS MODERATE 35: CPT | Mod: ,,, | Performed by: PHYSICIAN ASSISTANT

## 2018-10-27 PROCEDURE — S4991 NICOTINE PATCH NONLEGEND: HCPCS | Performed by: STUDENT IN AN ORGANIZED HEALTH CARE EDUCATION/TRAINING PROGRAM

## 2018-10-27 PROCEDURE — 25000003 PHARM REV CODE 250: Performed by: HOSPITALIST

## 2018-10-27 PROCEDURE — 80053 COMPREHEN METABOLIC PANEL: CPT

## 2018-10-27 PROCEDURE — 25000242 PHARM REV CODE 250 ALT 637 W/ HCPCS: Performed by: STUDENT IN AN ORGANIZED HEALTH CARE EDUCATION/TRAINING PROGRAM

## 2018-10-27 PROCEDURE — 83735 ASSAY OF MAGNESIUM: CPT

## 2018-10-27 RX ADMIN — TAMSULOSIN HYDROCHLORIDE 0.4 MG: 0.4 CAPSULE ORAL at 08:10

## 2018-10-27 RX ADMIN — LEVETIRACETAM 500 MG: 500 TABLET ORAL at 08:10

## 2018-10-27 RX ADMIN — ATORVASTATIN CALCIUM 80 MG: 20 TABLET, FILM COATED ORAL at 08:10

## 2018-10-27 RX ADMIN — DEXAMETHASONE 4 MG: 1 TABLET ORAL at 05:10

## 2018-10-27 RX ADMIN — DEXAMETHASONE 4 MG: 1 TABLET ORAL at 12:10

## 2018-10-27 RX ADMIN — PANTOPRAZOLE SODIUM 40 MG: 40 TABLET, DELAYED RELEASE ORAL at 08:10

## 2018-10-27 RX ADMIN — LEVETIRACETAM 500 MG: 500 TABLET ORAL at 09:10

## 2018-10-27 RX ADMIN — HEPARIN SODIUM 5000 UNITS: 5000 INJECTION, SOLUTION INTRAVENOUS; SUBCUTANEOUS at 06:10

## 2018-10-27 RX ADMIN — FINASTERIDE 5 MG: 5 TABLET, FILM COATED ORAL at 08:10

## 2018-10-27 RX ADMIN — DEXAMETHASONE 4 MG: 1 TABLET ORAL at 06:10

## 2018-10-27 RX ADMIN — ASPIRIN 81 MG: 81 TABLET, COATED ORAL at 08:10

## 2018-10-27 RX ADMIN — HYDRALAZINE HYDROCHLORIDE 25 MG: 25 TABLET ORAL at 01:10

## 2018-10-27 RX ADMIN — NICOTINE 1 PATCH: 14 PATCH, EXTENDED RELEASE TRANSDERMAL at 08:10

## 2018-10-27 RX ADMIN — HEPARIN SODIUM 5000 UNITS: 5000 INJECTION, SOLUTION INTRAVENOUS; SUBCUTANEOUS at 01:10

## 2018-10-27 RX ADMIN — HEPARIN SODIUM 5000 UNITS: 5000 INJECTION, SOLUTION INTRAVENOUS; SUBCUTANEOUS at 09:10

## 2018-10-27 RX ADMIN — TIOTROPIUM BROMIDE 18 MCG: 18 CAPSULE ORAL; RESPIRATORY (INHALATION) at 08:10

## 2018-10-27 NOTE — SUBJECTIVE & OBJECTIVE
Interval History: No pain, no new weakness.  Patient is oriented but intermittent confusion.    Medications:  Continuous Infusions:  Scheduled Meds:   aspirin  81 mg Oral Daily    atorvastatin  80 mg Oral Daily    dexamethasone  4 mg Oral Q6H    finasteride  5 mg Oral Daily    heparin (porcine)  5,000 Units Subcutaneous Q8H    insulin aspart U-100  5 Units Subcutaneous Once    levETIRAcetam  500 mg Oral BID    nicotine  1 patch Transdermal Daily    pantoprazole  40 mg Oral Daily    tamsulosin  0.4 mg Oral Daily    tiotropium  1 capsule Inhalation Daily     PRN Meds:dextrose 50%, dextrose 50%, glucagon (human recombinant), glucose, glucose, hydrALAZINE, HYDROcodone-acetaminophen, insulin aspart U-100, sodium chloride 0.9%     Review of Systems  Objective:     Weight: 74.8 kg (165 lb 0 oz)  Body mass index is 25.84 kg/m².  Vital Signs (Most Recent):  Temp: 96.7 °F (35.9 °C) (10/27/18 1152)  Pulse: (!) 53 (10/27/18 1152)  Resp: 20 (10/27/18 1152)  BP: (!) 187/88 (10/27/18 1152)  SpO2: 98 % (10/27/18 1152) Vital Signs (24h Range):  Temp:  [96.1 °F (35.6 °C)-98.6 °F (37 °C)] 96.7 °F (35.9 °C)  Pulse:  [52-60] 53  Resp:  [17-20] 20  SpO2:  [94 %-98 %] 98 %  BP: (107-187)/(54-88) 187/88     Date 10/27/18 0700 - 10/28/18 0659   Shift 6985-5715 8679-2621 3663-0785 24 Hour Total   INTAKE   Shift Total(mL/kg)       OUTPUT   Urine(mL/kg/hr) 850   850   Emesis/NG output 0   0   Other 0   0   Stool 1   1   Blood 0   0   Shift Total(mL/kg) 851(11.4)   851(11.4)   Weight (kg) 74.8 74.8 74.8 74.8                   Male External Urinary Catheter 10/26/18 1510 (Active)   Collection Container Standard drainage bag 10/27/2018  7:30 AM   Securement Method secured to top of thigh w/ adhesive device 10/27/2018  7:30 AM   Skin no redness;no breakdown 10/27/2018  7:30 AM   Tolerance no signs/symptoms of discomfort 10/27/2018  7:30 AM   Output (mL) 400 mL 10/26/2018  6:00 PM   Catheter Change Date 10/26/18 10/26/2018  4:30 PM    Catheter Change Time 1600 10/26/2018  4:30 PM       Neurosurgery Physical Exam   General: well developed, well nourished, no distress  Head: normocephalic, atraumatic  Neurologic: Alert and oriented. Thought content appropriate  GCS: Motor: 6/Verbal: 4/Eyes: 4 GCS Total: 14  Mental Status: Awake, Alert, Oriented x 3- unaware of why he is in the hospital   Language: No aphasia  Speech: No dysarthria  Cranial nerves: slight right facial droop, tongue midline, CN II-XII grossly intact.   Eyes: pupils equal, round, reactive to light with accommodation, EOMI  Pulmonary: normal respirations, not labored, no accessory muscles used  Abdomen: soft, non-distended, not tender to palpation  Sensory: intact to light touch throughout  Motor Strength: Moves all extremities spontaneously with good tone.   No abnormal movements seen.      Strength   Deltoids Triceps Biceps Wrist Extension Wrist Flexion Hand    Upper: R 4/5 4/5 4/5 4/5 4/5 4+/5     L 5/5 5/5 5/5 5/5 5/5 5/5       Iliopsoas Quadriceps Knee  Flexion Tibialis  anterior Gastro- cnemius EHL   Lower: R 4/5 4/5 4/5 4/5 4/5 4/5     L 5/5 5/5 5/5 5/5 5/5 5/5      Pronator Drift: no drift noted   Finger-to-nose: Intact bilaterally  Singh: absent  Clonus: absent  Babinski: absent  Pulses: 2+ and symmetric radial and dorsalis pedis  Skin: warm, dry and intact, no rashes             Significant Labs:  Recent Labs   Lab 10/26/18  0422 10/27/18  0413   GLU 85 108    133*   K 4.0 3.8    103   CO2 23 24   BUN 17 17   CREATININE 0.7 0.7   CALCIUM 8.7 8.3*   MG 2.3 2.1     Recent Labs   Lab 10/26/18  0422 10/27/18  0413   WBC 10.74 9.46   HGB 12.8* 11.8*   HCT 40.9 37.9*    247     No results for input(s): LABPT, INR, APTT in the last 48 hours.  Microbiology Results (last 7 days)     Procedure Component Value Units Date/Time    Urine culture [[id number]] Collected:  10/26/18 1054    Order Status:  Sent Specimen:  Urine, Clean Catch Updated:  10/26/18 8874           Significant Diagnostics:  No new imaging

## 2018-10-27 NOTE — ASSESSMENT & PLAN NOTE
Lung mass  Vasogenic cerebral edema  66 yo M w/ PMH of HTN, CAD s/p CABG, carotid artery disease s/p stenting, and elevated PSA who presents as transfer for evaluation of brain lesion w/ newly found mass-like consolidations on CXR. Concern for primary lung lesion w/ significant smoking history and brain metastasis w/ vasogenic edema. Mild expressive aphasia, R>L weakness, difficulty w/ mobility. Hypertensive concerning for Cushing reflex secondary to cerebral edema. HR wnl.   - IV decadron 4mg q6  - keppra 500 mg BID  - neurochecks q4  - CT Neck/chest/abdomen/pelvis w/ contrast showed b/l lung mass, liver masses and  MRI brain showed frontal brain mass  - underwent bronch biopsy   - neurosurgery following appreciate their recs, they F/U with as an outpatient, and no procedures to be done other than XRT treatment   - hem/onc following  - follow biopsy results still pending

## 2018-10-27 NOTE — ASSESSMENT & PLAN NOTE
- Chronic likely secondary to COPD given CXR findings and smoking history  - Not on O2 at home  - SpO2 mid 90s on RA  - duonebs PRN  - On tiotropium daily

## 2018-10-27 NOTE — PROGRESS NOTES
[facility] Hospital Medicine  Progress Note    Patient Name: Lorenzo Tran Sr.  MRN: [id number]  Patient Class: IP- Inpatient   Admission Date: 10/23/2018  Length of Stay: 4 days  Attending Physician: Berenice Shipman MD  Primary Care Provider: Padmini Dailey [facility] Medicine Team: [facility] HOSP MED 1 Tawnya Corbin MD    Subjective:     Principal Problem:Brain metastasis    HPI:  66 yo M w/ PMH of HTN, CAD s/p CABG, carotid artery disease s/p stenting, and elevated PSA who presents as transfer for evaluation of brain lesion. Patient was brought to [facility] ED by his family w/ concern of altered mental status and generalized weakness. Family was concerned that he was not acting like himself. Patient shares that yesterday he attempted to take a bath and was unable to lift himself out of the tub. He waited 4 hours until a family member called EMS. He endorses chronic RLE weakness secondary to work related incident approximately 30 years ago. He also endorses new RUE weakness. On evaluation in the ED, he was noted to have very mild R facial droop. CT head demonstrated vasogenic edema w/ 1 cm nodular lesion in the L frontal lobe. CXR was significant for mass like consolidations in the L lung. Complains of chronic cough w/ occasional sputum. Shares that he had 1 episode of hemoptysis 2-3 months ago. Patient has no previous lung history but endorses a long history of smoking cigars. Reports that he would smoke a pack of cigars a day. In the ED prior to transfer he received decadron 10mg. No seizure like activity noted and no previous seizure history. Reports shortness of breath and urinary symptoms. Denies fever, chills, chest pain, and abdominal pain.       Hospital Course:  10/25 underwent bronchoscopy, biopsy was taken from the his lung mass, MRI brain showed frontal mass with vasogenic edema, abdominal CT showed multiple liver mass concerning for met, neurosurgery consulted will  involve hem/och tomorrow.   10/26 No events overnight, u/s testicles showed right sided hydrocele,will follow with urology out patient, biopsy still in process, no acute interventions by neurosurgery, hem/onc following and developing treatment plan.    Review of Systems   Constitutional: Negative for activity change, appetite change, chills, diaphoresis, fatigue and fever.   HENT: Negative for rhinorrhea, sinus pressure, sinus pain, sore throat and voice change.    Eyes: Positive for visual disturbance (When he doesn't use his glasses). Negative for pain and discharge.   Respiratory: Positive for cough and shortness of breath (When he changes positions ). Negative for apnea and chest tightness.    Cardiovascular: Negative for chest pain, palpitations and leg swelling.   Gastrointestinal: Negative for abdominal distention, abdominal pain, blood in stool, constipation, diarrhea, nausea and vomiting.        Difficulty swallowing    Genitourinary: Positive for difficulty urinating and scrotal swelling (Due to hydrocele). Negative for flank pain, hematuria, testicular pain and urgency.   Musculoskeletal: Negative for joint swelling.   Neurological: Positive for headaches. Negative for tremors, seizures and speech difficulty.   Psychiatric/Behavioral: Positive for confusion (Mildly). Negative for agitation and behavioral problems.     Objective:     Vital Signs (Most Recent):  Temp: 97.6 °F (36.4 °C) (10/27/18 0729)  Pulse: (!) 59 (10/27/18 0853)  Resp: 20 (10/27/18 0853)  BP: (!) 144/65 (10/27/18 0729)  SpO2: 97 % (10/27/18 0729) Vital Signs (24h Range):  Temp:  [96.1 °F (35.6 °C)-98.6 °F (37 °C)] 97.6 °F (36.4 °C)  Pulse:  [52-60] 59  Resp:  [17-20] 20  SpO2:  [94 %-97 %] 97 %  BP: (107-147)/(54-65) 144/65     Weight: 74.8 kg (165 lb 0 oz)  Body mass index is 25.84 kg/m².    Intake/Output Summary (Last 24 hours) at 10/27/2018 1051  Last data filed at 10/27/2018 0500  Gross per 24 hour   Intake 2500 ml   Output 400 ml    Net 2100 ml      Physical Exam   Constitutional: He is oriented to person, place, and time. He appears well-developed and well-nourished. No distress.   HENT:   Head: Normocephalic.   Neck: No JVD present.   Cardiovascular: Normal rate, regular rhythm and normal heart sounds.   Pulmonary/Chest: Effort normal. No respiratory distress.   Couldn't assess breathing posteriorly, and the pt was refusing    Abdominal: Soft. Bowel sounds are normal. He exhibits no distension. There is no tenderness.   Musculoskeletal: Normal range of motion. He exhibits no edema, tenderness or deformity.   Neurological: He is alert and oriented to person, place, and time.   Skin: He is not diaphoretic.       Significant Labs:   BMP:   Recent Labs   Lab 10/27/18  0413      *   K 3.8      CO2 24   BUN 17   CREATININE 0.7   CALCIUM 8.3*   MG 2.1     CBC:   Recent Labs   Lab 10/26/18  0422 10/27/18  0413   WBC 10.74 9.46   HGB 12.8* 11.8*   HCT 40.9 37.9*    247     CMP:   Recent Labs   Lab 10/26/18  0422 10/27/18  0413    133*   K 4.0 3.8    103   CO2 23 24   GLU 85 108   BUN 17 17   CREATININE 0.7 0.7   CALCIUM 8.7 8.3*   PROT 6.6 5.9*   ALBUMIN 2.3* 2.2*   BILITOT 0.3 0.2   ALKPHOS 71 64   AST 17 15   ALT 15 20   ANIONGAP 10 6*   EGFRNONAA >60.0 >60.0         Assessment/Plan:      * Brain metastasis    Lung mass  Vasogenic cerebral edema  66 yo M w/ PMH of HTN, CAD s/p CABG, carotid artery disease s/p stenting, and elevated PSA who presents as transfer for evaluation of brain lesion w/ newly found mass-like consolidations on CXR. Concern for primary lung lesion w/ significant smoking history and brain metastasis w/ vasogenic edema. Mild expressive aphasia, R>L weakness, difficulty w/ mobility. Hypertensive concerning for Cushing reflex secondary to cerebral edema. HR wnl.   - IV decadron 4mg q6  - keppra 500 mg BID  - neurochecks q4  - CT Neck/chest/abdomen/pelvis w/ contrast showed b/l lung mass,  liver masses and  MRI brain showed frontal brain mass  - underwent bronch biopsy   - neurosurgery following appreciate their recs, they F/U with as an outpatient, and no procedures to be done other than XRT treatment   - hem/onc following  - follow biopsy results still pending           Hyponatremia    Levels are mildly low  - Most likely due to SIADH    Plan:  - Fluid restriction to 1 L per day       Discharge planning issues    PT/OT recommended SNF        Testicle swelling    Unsure duration  - patient reported it was noticed by his PCP 6 month ago  - denies pain, reported difficulty urination, dysuria and dripping   - right testicular swelling, soft,non tender. Soft non tender prostate on exam.  - continue Flomax and finasteride  - U/s testicles consistent with right testicles hydrocele   - will arrange urology appointment upon discharge      Lab Results   Component Value Date    PSA 2.2 10/25/2018    PSA 6.9 (H) 05/18/2017              Lung mass    - Hem/Onc suspecting non small cell lung carcinoma (NSCLC), most likely an adenocarcinoma       Chest pain    - Described as sharp, radiating from his R chest across to the L, non-reproducible  - Reports that this is a chronic occurrence, comes and goes   - Concern for MSK vs ACS vs PE vs likely cancerous masses on CXR  - Obtained EKG, negative for STEMI;   - resumed aspirin 81 mg  --- resolved      Essential hypertension    - stable  - currently normotensive  - resume coreg 12.5 mg BID  - possibly secondary to cushing reflex, will continue to monitor  - SBP <200       MCCRAY (dyspnea on exertion)    - Chronic likely secondary to COPD given CXR findings and smoking history  - Not on O2 at home  - SpO2 mid 90s on RA  - duonebs PRN  - On tiotropium daily       Elevated PSA    - hx of elevated PSA  - nursing reports of urinary incontinence, dribbling, weak stream  - resume finasteride, tamsulosin       Coronary artery disease involving native heart    Continue coreg,  Asprin ant atorvastatin          Tobacco abuse    - acknowledges need to quit  - would benefit from smoking cessation counseling  - nicotine patch.          VTE Risk Mitigation (From admission, onward)        Ordered     heparin (porcine) injection 5,000 Units  Every 8 hours      10/26/18 0915     IP VTE HIGH RISK PATIENT  Once      10/24/18 0342              Tawnya Corbin MD  Department of Hospital Medicine   [facility]                    10/27/2018                             STAFF PHYSICIAN NOTE                                   Attending Attestation for Rounds with Resident  I have reviewed and concur with the resident's history, physical, assessment, and plan.  I have personally interviewed and examined the patient at bedside and agree with the resident's findings.                                  ________________________________________                                     REASON FOR ADMISSION:     Patient is 67 y.o.male    Body mass index is 25.84 kg/m².,  Brain metastasis

## 2018-10-27 NOTE — ASSESSMENT & PLAN NOTE
- Described as sharp, radiating from his R chest across to the L, non-reproducible  - Reports that this is a chronic occurrence, comes and goes   - Concern for MSK vs ACS vs PE vs likely cancerous masses on CXR  - Obtained EKG, negative for STEMI;   - resumed aspirin 81 mg  --- resolved

## 2018-10-27 NOTE — PLAN OF CARE
Problem: Patient Care Overview  Goal: Plan of Care Review  Outcome: Ongoing (interventions implemented as appropriate)  POC reviewed with pt. AAO. accu-checks. Condom cath in place.   Pt remained free from falls. Questions and concerns addressed. Pt progressing towards goals. Will continue to monitor. See flow sheets for full assessment and VS

## 2018-10-27 NOTE — SUBJECTIVE & OBJECTIVE
Review of Systems   Constitutional: Negative for activity change, appetite change, chills, diaphoresis, fatigue and fever.   HENT: Negative for rhinorrhea, sinus pressure, sinus pain, sore throat and voice change.    Eyes: Positive for visual disturbance (When he doesn't use his glasses). Negative for pain and discharge.   Respiratory: Positive for cough and shortness of breath (When he changes positions ). Negative for apnea and chest tightness.    Cardiovascular: Negative for chest pain, palpitations and leg swelling.   Gastrointestinal: Negative for abdominal distention, abdominal pain, blood in stool, constipation, diarrhea, nausea and vomiting.        Difficulty swallowing    Genitourinary: Positive for difficulty urinating and scrotal swelling (Due to hydrocele). Negative for flank pain, hematuria, testicular pain and urgency.   Musculoskeletal: Negative for joint swelling.   Neurological: Positive for headaches. Negative for tremors, seizures and speech difficulty.   Psychiatric/Behavioral: Positive for confusion (Mildly). Negative for agitation and behavioral problems.     Objective:     Vital Signs (Most Recent):  Temp: 97.6 °F (36.4 °C) (10/27/18 0729)  Pulse: (!) 59 (10/27/18 0853)  Resp: 20 (10/27/18 0853)  BP: (!) 144/65 (10/27/18 0729)  SpO2: 97 % (10/27/18 0729) Vital Signs (24h Range):  Temp:  [96.1 °F (35.6 °C)-98.6 °F (37 °C)] 97.6 °F (36.4 °C)  Pulse:  [52-60] 59  Resp:  [17-20] 20  SpO2:  [94 %-97 %] 97 %  BP: (107-147)/(54-65) 144/65     Weight: 74.8 kg (165 lb 0 oz)  Body mass index is 25.84 kg/m².    Intake/Output Summary (Last 24 hours) at 10/27/2018 1051  Last data filed at 10/27/2018 0500  Gross per 24 hour   Intake 2500 ml   Output 400 ml   Net 2100 ml      Physical Exam   Constitutional: He is oriented to person, place, and time. He appears well-developed and well-nourished. No distress.   HENT:   Head: Normocephalic.   Neck: No JVD present.   Cardiovascular: Normal rate, regular rhythm  and normal heart sounds.   Pulmonary/Chest: Effort normal. No respiratory distress.   Couldn't assess breathing posteriorly, and the pt was refusing    Abdominal: Soft. Bowel sounds are normal. He exhibits no distension. There is no tenderness.   Musculoskeletal: Normal range of motion. He exhibits no edema, tenderness or deformity.   Neurological: He is alert and oriented to person, place, and time.   Skin: He is not diaphoretic.       Significant Labs:   BMP:   Recent Labs   Lab 10/27/18  0413      *   K 3.8      CO2 24   BUN 17   CREATININE 0.7   CALCIUM 8.3*   MG 2.1     CBC:   Recent Labs   Lab 10/26/18  0422 10/27/18  0413   WBC 10.74 9.46   HGB 12.8* 11.8*   HCT 40.9 37.9*    247     CMP:   Recent Labs   Lab 10/26/18  0422 10/27/18  0413    133*   K 4.0 3.8    103   CO2 23 24   GLU 85 108   BUN 17 17   CREATININE 0.7 0.7   CALCIUM 8.7 8.3*   PROT 6.6 5.9*   ALBUMIN 2.3* 2.2*   BILITOT 0.3 0.2   ALKPHOS 71 64   AST 17 15   ALT 15 20   ANIONGAP 10 6*   EGFRNONAA >60.0 >60.0

## 2018-10-27 NOTE — PROGRESS NOTES
[facility]  Neurosurgery  Progress Note    Subjective:     History of Present Illness: Mr. Tran is a 67yoM with PMHx HTN, CAD s/p CABG, carotid artery disease s/p stent, who transferred from [facility] ED for altered mental status & generalized weakness, found to have a brain lesion on CTH.  He was transferred to [facility] for Nsurg evaluation.  History is obtained from patient & chart, as patient continues to have AMS.  Per chart, his family brought him in after he attempted to take a bath & was unable to get out of the bathtub and his family did not feel he was acting himself.  He reports baseline RLE weakness due to a work accident many years ago, but feels RUE weakness is new.  Patient does have a smoking history & was found to have lung mass, as well as liver lesions.  He is not on any antiplatelet or anticoagulant agents.  Unknown if prior stroke, no family available bedside.    Post-Op Info:  * No surgery found *         Interval History: No pain, no new weakness.  Patient is oriented but intermittent confusion.    Medications:  Continuous Infusions:  Scheduled Meds:   aspirin  81 mg Oral Daily    atorvastatin  80 mg Oral Daily    dexamethasone  4 mg Oral Q6H    finasteride  5 mg Oral Daily    heparin (porcine)  5,000 Units Subcutaneous Q8H    insulin aspart U-100  5 Units Subcutaneous Once    levETIRAcetam  500 mg Oral BID    nicotine  1 patch Transdermal Daily    pantoprazole  40 mg Oral Daily    tamsulosin  0.4 mg Oral Daily    tiotropium  1 capsule Inhalation Daily     PRN Meds:dextrose 50%, dextrose 50%, glucagon (human recombinant), glucose, glucose, hydrALAZINE, HYDROcodone-acetaminophen, insulin aspart U-100, sodium chloride 0.9%     Review of Systems  Objective:     Weight: 74.8 kg (165 lb 0 oz)  Body mass index is 25.84 kg/m².  Vital Signs (Most Recent):  Temp: 96.7 °F (35.9 °C) (10/27/18 1152)  Pulse: (!) 53 (10/27/18 1152)  Resp: 20 (10/27/18 1152)  BP: (!) 187/88  (10/27/18 1152)  SpO2: 98 % (10/27/18 1152) Vital Signs (24h Range):  Temp:  [96.1 °F (35.6 °C)-98.6 °F (37 °C)] 96.7 °F (35.9 °C)  Pulse:  [52-60] 53  Resp:  [17-20] 20  SpO2:  [94 %-98 %] 98 %  BP: (107-187)/(54-88) 187/88     Date 10/27/18 0700 - 10/28/18 0659   Shift 6937-8346 2577-5182 8337-4192 24 Hour Total   INTAKE   Shift Total(mL/kg)       OUTPUT   Urine(mL/kg/hr) 850   850   Emesis/NG output 0   0   Other 0   0   Stool 1   1   Blood 0   0   Shift Total(mL/kg) 851(11.4)   851(11.4)   Weight (kg) 74.8 74.8 74.8 74.8                   Male External Urinary Catheter 10/26/18 1510 (Active)   Collection Container Standard drainage bag 10/27/2018  7:30 AM   Securement Method secured to top of thigh w/ adhesive device 10/27/2018  7:30 AM   Skin no redness;no breakdown 10/27/2018  7:30 AM   Tolerance no signs/symptoms of discomfort 10/27/2018  7:30 AM   Output (mL) 400 mL 10/26/2018  6:00 PM   Catheter Change Date 10/26/18 10/26/2018  4:30 PM   Catheter Change Time 1600 10/26/2018  4:30 PM       Neurosurgery Physical Exam   General: well developed, well nourished, no distress  Head: normocephalic, atraumatic  Neurologic: Alert and oriented. Thought content appropriate  GCS: Motor: 6/Verbal: 4/Eyes: 4 GCS Total: 14  Mental Status: Awake, Alert, Oriented x 3- unaware of why he is in the hospital   Language: No aphasia  Speech: No dysarthria  Cranial nerves: slight right facial droop, tongue midline, CN II-XII grossly intact.   Eyes: pupils equal, round, reactive to light with accommodation, EOMI  Pulmonary: normal respirations, not labored, no accessory muscles used  Abdomen: soft, non-distended, not tender to palpation  Sensory: intact to light touch throughout  Motor Strength: Moves all extremities spontaneously with good tone.   No abnormal movements seen.      Strength   Deltoids Triceps Biceps Wrist Extension Wrist Flexion Hand    Upper: R 4/5 4/5 4/5 4/5 4/5 4+/5     L 5/5 5/5 5/5 5/5 5/5 5/5        Iliopsoas Quadriceps Knee  Flexion Tibialis  anterior Gastro- cnemius EHL   Lower: R 4/5 4/5 4/5 4/5 4/5 4/5     L 5/5 5/5 5/5 5/5 5/5 5/5      Pronator Drift: no drift noted   Finger-to-nose: Intact bilaterally  Singh: absent  Clonus: absent  Babinski: absent  Pulses: 2+ and symmetric radial and dorsalis pedis  Skin: warm, dry and intact, no rashes             Significant Labs:  Recent Labs   Lab 10/26/18  0422 10/27/18  0413   GLU 85 108    133*   K 4.0 3.8    103   CO2 23 24   BUN 17 17   CREATININE 0.7 0.7   CALCIUM 8.7 8.3*   MG 2.3 2.1     Recent Labs   Lab 10/26/18  0422 10/27/18  0413   WBC 10.74 9.46   HGB 12.8* 11.8*   HCT 40.9 37.9*    247     No results for input(s): LABPT, INR, APTT in the last 48 hours.  Microbiology Results (last 7 days)     Procedure Component Value Units Date/Time    Urine culture [[id number]] Collected:  10/26/18 1054    Order Status:  Sent Specimen:  Urine, Clean Catch Updated:  10/26/18 1334          Significant Diagnostics:  No new imaging    Assessment/Plan:     * Brain metastasis    Mr. Tran is a 67yoM with PMHx HTN, CAD s/p CABG, carotid artery disease s/p stenting, BPH, who presented to [facility] with AMS and found to have left frontal brain mass, likely metastasis    -No acute neurosurgical intervention indicated  -Will plan for outpatient radiosurgery. Appts will be set up and communicated with patient and family.   -q4h neuro checks  -Continue dex for cerebral edema, 4mg q6h  -PPI while on dex  -SBP <160  -Continue Keppra 500mg for seizure prophylaxis   -Pulm lesion - s/p bronch with pulm, suspecting NSCLC  -will continue to follow  -Discussed with Dr. Neal Nicholas, PA-C  Neurosurgery  [facility]

## 2018-10-28 LAB
ALBUMIN SERPL BCP-MCNC: 2.4 G/DL
ALP SERPL-CCNC: 62 U/L
ALT SERPL W/O P-5'-P-CCNC: 23 U/L
ANION GAP SERPL CALC-SCNC: 6 MMOL/L
AST SERPL-CCNC: 11 U/L
BASOPHILS # BLD AUTO: 0.01 K/UL
BASOPHILS NFR BLD: 0.1 %
BILIRUB SERPL-MCNC: 0.3 MG/DL
BUN SERPL-MCNC: 18 MG/DL
CALCIUM SERPL-MCNC: 8.4 MG/DL
CHLORIDE SERPL-SCNC: 103 MMOL/L
CO2 SERPL-SCNC: 25 MMOL/L
CREAT SERPL-MCNC: 0.8 MG/DL
DIFFERENTIAL METHOD: ABNORMAL
EOSINOPHIL # BLD AUTO: 0.1 K/UL
EOSINOPHIL NFR BLD: 0.9 %
ERYTHROCYTE [DISTWIDTH] IN BLOOD BY AUTOMATED COUNT: 13.8 %
EST. GFR  (AFRICAN AMERICAN): >60 ML/MIN/1.73 M^2
EST. GFR  (NON AFRICAN AMERICAN): >60 ML/MIN/1.73 M^2
GLUCOSE SERPL-MCNC: 131 MG/DL
HCT VFR BLD AUTO: 39.5 %
HGB BLD-MCNC: 12.2 G/DL
IMM GRANULOCYTES # BLD AUTO: 0.12 K/UL
IMM GRANULOCYTES NFR BLD AUTO: 1.1 %
LYMPHOCYTES # BLD AUTO: 1.5 K/UL
LYMPHOCYTES NFR BLD: 14.3 %
MAGNESIUM SERPL-MCNC: 2.2 MG/DL
MCH RBC QN AUTO: 26.1 PG
MCHC RBC AUTO-ENTMCNC: 30.9 G/DL
MCV RBC AUTO: 85 FL
MONOCYTES # BLD AUTO: 0.7 K/UL
MONOCYTES NFR BLD: 6.2 %
NEUTROPHILS # BLD AUTO: 8.3 K/UL
NEUTROPHILS NFR BLD: 77.4 %
NRBC BLD-RTO: 0 /100 WBC
PHOSPHATE SERPL-MCNC: 2.9 MG/DL
PLATELET # BLD AUTO: 242 K/UL
PMV BLD AUTO: 10.9 FL
POCT GLUCOSE: 144 MG/DL (ref 70–110)
POCT GLUCOSE: 155 MG/DL (ref 70–110)
POCT GLUCOSE: 176 MG/DL (ref 70–110)
POCT GLUCOSE: 283 MG/DL (ref 70–110)
POTASSIUM SERPL-SCNC: 4 MMOL/L
PROT SERPL-MCNC: 6.1 G/DL
RBC # BLD AUTO: 4.67 M/UL
SODIUM SERPL-SCNC: 134 MMOL/L
WBC # BLD AUTO: 10.68 K/UL

## 2018-10-28 PROCEDURE — 99232 SBSQ HOSP IP/OBS MODERATE 35: CPT | Mod: ,,, | Performed by: HOSPITALIST

## 2018-10-28 PROCEDURE — 25000003 PHARM REV CODE 250: Performed by: STUDENT IN AN ORGANIZED HEALTH CARE EDUCATION/TRAINING PROGRAM

## 2018-10-28 PROCEDURE — 63600175 PHARM REV CODE 636 W HCPCS: Performed by: STUDENT IN AN ORGANIZED HEALTH CARE EDUCATION/TRAINING PROGRAM

## 2018-10-28 PROCEDURE — 11000001 HC ACUTE MED/SURG PRIVATE ROOM

## 2018-10-28 PROCEDURE — 25000242 PHARM REV CODE 250 ALT 637 W/ HCPCS: Performed by: STUDENT IN AN ORGANIZED HEALTH CARE EDUCATION/TRAINING PROGRAM

## 2018-10-28 PROCEDURE — 84100 ASSAY OF PHOSPHORUS: CPT

## 2018-10-28 PROCEDURE — 80053 COMPREHEN METABOLIC PANEL: CPT

## 2018-10-28 PROCEDURE — S4991 NICOTINE PATCH NONLEGEND: HCPCS | Performed by: STUDENT IN AN ORGANIZED HEALTH CARE EDUCATION/TRAINING PROGRAM

## 2018-10-28 PROCEDURE — 99232 SBSQ HOSP IP/OBS MODERATE 35: CPT | Mod: ,,, | Performed by: NEUROLOGICAL SURGERY

## 2018-10-28 PROCEDURE — 83735 ASSAY OF MAGNESIUM: CPT

## 2018-10-28 PROCEDURE — 85025 COMPLETE CBC W/AUTO DIFF WBC: CPT

## 2018-10-28 PROCEDURE — 36415 COLL VENOUS BLD VENIPUNCTURE: CPT

## 2018-10-28 RX ORDER — AMLODIPINE BESYLATE 5 MG/1
5 TABLET ORAL DAILY
Status: DISCONTINUED | OUTPATIENT
Start: 2018-10-28 | End: 2018-11-04 | Stop reason: HOSPADM

## 2018-10-28 RX ADMIN — HEPARIN SODIUM 5000 UNITS: 5000 INJECTION, SOLUTION INTRAVENOUS; SUBCUTANEOUS at 03:10

## 2018-10-28 RX ADMIN — LEVETIRACETAM 500 MG: 500 TABLET ORAL at 09:10

## 2018-10-28 RX ADMIN — DEXAMETHASONE 4 MG: 1 TABLET ORAL at 07:10

## 2018-10-28 RX ADMIN — PANTOPRAZOLE SODIUM 40 MG: 40 TABLET, DELAYED RELEASE ORAL at 08:10

## 2018-10-28 RX ADMIN — ASPIRIN 81 MG: 81 TABLET, COATED ORAL at 08:10

## 2018-10-28 RX ADMIN — FINASTERIDE 5 MG: 5 TABLET, FILM COATED ORAL at 08:10

## 2018-10-28 RX ADMIN — DEXAMETHASONE 4 MG: 1 TABLET ORAL at 12:10

## 2018-10-28 RX ADMIN — TIOTROPIUM BROMIDE 18 MCG: 18 CAPSULE ORAL; RESPIRATORY (INHALATION) at 08:10

## 2018-10-28 RX ADMIN — TAMSULOSIN HYDROCHLORIDE 0.4 MG: 0.4 CAPSULE ORAL at 08:10

## 2018-10-28 RX ADMIN — INSULIN ASPART 3 UNITS: 100 INJECTION, SOLUTION INTRAVENOUS; SUBCUTANEOUS at 09:10

## 2018-10-28 RX ADMIN — DEXAMETHASONE 4 MG: 1 TABLET ORAL at 05:10

## 2018-10-28 RX ADMIN — NICOTINE 1 PATCH: 14 PATCH, EXTENDED RELEASE TRANSDERMAL at 01:10

## 2018-10-28 RX ADMIN — HEPARIN SODIUM 5000 UNITS: 5000 INJECTION, SOLUTION INTRAVENOUS; SUBCUTANEOUS at 05:10

## 2018-10-28 RX ADMIN — ATORVASTATIN CALCIUM 80 MG: 20 TABLET, FILM COATED ORAL at 08:10

## 2018-10-28 RX ADMIN — LEVETIRACETAM 500 MG: 500 TABLET ORAL at 08:10

## 2018-10-28 RX ADMIN — HEPARIN SODIUM 5000 UNITS: 5000 INJECTION, SOLUTION INTRAVENOUS; SUBCUTANEOUS at 09:10

## 2018-10-28 NOTE — ASSESSMENT & PLAN NOTE
- Chronic likely secondary to COPD given CXR findings and smoking history  - Not on O2 at home  - duonebs PRN  - On tiotropium daily

## 2018-10-28 NOTE — PLAN OF CARE
Problem: Patient Care Overview  Goal: Plan of Care Review  Outcome: Ongoing (interventions implemented as appropriate)   10/28/18 0093   Coping/Psychosocial   Plan Of Care Reviewed With patient

## 2018-10-28 NOTE — PROGRESS NOTES
[facility] Hospital Medicine  Progress Note    Patient Name: Lorenzo Tran Sr.  MRN: [id number]  Patient Class: IP- Inpatient   Admission Date: 10/23/2018  Length of Stay: 5 days  Attending Physician: Berenice Shipman MD  Primary Care Provider: Padmini Dailey [facility] Medicine Team: [facility] 1 Tawnya Corbin MD    Subjective:     Principal Problem:Brain metastasis    HPI:  66 yo M w/ PMH of HTN, CAD s/p CABG, carotid artery disease s/p stenting, and elevated PSA who presents as transfer for evaluation of brain lesion. Patient was brought to [facility] ED by his family w/ concern of altered mental status and generalized weakness. Family was concerned that he was not acting like himself. Patient shares that yesterday he attempted to take a bath and was unable to lift himself out of the tub. He waited 4 hours until a family member called EMS. He endorses chronic RLE weakness secondary to work related incident approximately 30 years ago. He also endorses new RUE weakness. On evaluation in the ED, he was noted to have very mild R facial droop. CT head demonstrated vasogenic edema w/ 1 cm nodular lesion in the L frontal lobe. CXR was significant for mass like consolidations in the L lung. Complains of chronic cough w/ occasional sputum. Shares that he had 1 episode of hemoptysis 2-3 months ago. Patient has no previous lung history but endorses a long history of smoking cigars. Reports that he would smoke a pack of cigars a day. In the ED prior to transfer he received decadron 10mg. No seizure like activity noted and no previous seizure history. Reports shortness of breath and urinary symptoms. Denies fever, chills, chest pain, and abdominal pain.       Hospital Course:  10/25 underwent bronchoscopy, biopsy was taken from the his lung mass, MRI brain showed frontal mass with vasogenic edema, abdominal CT showed multiple liver mass concerning for met, neurosurgery consulted will  involve hem/och tomorrow.   10/26 No events overnight, u/s testicles showed right sided hydrocele,will follow with urology out patient, biopsy still in process, no acute interventions by neurosurgery, hem/onc following and developing treatment plan.    Review of Systems   Constitutional: Negative for chills, diaphoresis, fatigue and fever.   HENT: Negative for sinus pressure, sinus pain and sore throat.    Eyes: Negative for pain and discharge.   Respiratory: Negative for cough, chest tightness, shortness of breath and wheezing.    Cardiovascular: Negative for chest pain, palpitations and leg swelling.   Gastrointestinal: Negative for abdominal distention, abdominal pain, blood in stool, constipation, diarrhea, nausea and vomiting.   Genitourinary: Positive for scrotal swelling. Negative for difficulty urinating, flank pain and hematuria.   Musculoskeletal: Positive for back pain (Mostly on the Rt shoulder ).   Skin: Negative for color change and wound.   Neurological: Negative for dizziness, tremors, seizures, speech difficulty, numbness and headaches.   Hematological: Does not bruise/bleed easily.   Psychiatric/Behavioral: Positive for confusion. Negative for agitation.     Objective:     Vital Signs (Most Recent):  Temp: 97.5 °F (36.4 °C) (10/28/18 0800)  Pulse: (!) 51 (10/28/18 0800)  Resp: 20 (10/28/18 0800)  BP: (!) 168/74 (10/28/18 0800)  SpO2: 95 % (10/28/18 0800) Vital Signs (24h Range):  Temp:  [96.7 °F (35.9 °C)-98.5 °F (36.9 °C)] 97.5 °F (36.4 °C)  Pulse:  [51-72] 51  Resp:  [18-20] 20  SpO2:  [93 %-99 %] 95 %  BP: (117-187)/(59-88) 168/74     Weight: 74.8 kg (165 lb 0 oz)  Body mass index is 25.84 kg/m².    Intake/Output Summary (Last 24 hours) at 10/28/2018 1019  Last data filed at 10/28/2018 0300  Gross per 24 hour   Intake 250 ml   Output 851 ml   Net -601 ml      Physical Exam   Constitutional: He is oriented to person, place, and time. He appears well-developed and well-nourished. No distress.    HENT:   Head: Normocephalic.   Cardiovascular: Normal rate, regular rhythm and normal heart sounds.   No murmur heard.  Pulmonary/Chest: Effort normal and breath sounds normal. He has no wheezes. He has no rales. He exhibits no tenderness.   Abdominal: Soft. Bowel sounds are normal. He exhibits no distension. There is no tenderness.   Musculoskeletal: Normal range of motion. He exhibits no edema, tenderness or deformity.   Neurological: He is alert and oriented to person, place, and time.   Skin: He is not diaphoretic.       Significant Labs:   BMP:   Recent Labs   Lab 10/28/18  0417   *   *   K 4.0      CO2 25   BUN 18   CREATININE 0.8   CALCIUM 8.4*   MG 2.2     CBC:   Recent Labs   Lab 10/27/18  0413 10/28/18  0417   WBC 9.46 10.68   HGB 11.8* 12.2*   HCT 37.9* 39.5*    242     CMP:   Recent Labs   Lab 10/27/18  0413 10/28/18  0417   * 134*   K 3.8 4.0    103   CO2 24 25    131*   BUN 17 18   CREATININE 0.7 0.8   CALCIUM 8.3* 8.4*   PROT 5.9* 6.1   ALBUMIN 2.2* 2.4*   BILITOT 0.2 0.3   ALKPHOS 64 62   AST 15 11   ALT 20 23   ANIONGAP 6* 6*   EGFRNONAA >60.0 >60.0     Assessment/Plan:      * Brain metastasis    Lung mass  Vasogenic cerebral edema  66 yo M w/ PMH of HTN, CAD s/p CABG, carotid artery disease s/p stenting, and elevated PSA who presents as transfer for evaluation of brain lesion w/ newly found mass-like consolidations on CXR. Concern for primary lung lesion w/ significant smoking history and brain metastasis w/ vasogenic edema. Mild expressive aphasia, R>L weakness, difficulty w/ mobility. Hypertensive concerning for Cushing reflex secondary to cerebral edema. HR wnl.   - IV decadron 4mg q6  - keppra 500 mg BID  - neurochecks q4  - CT Neck/chest/abdomen/pelvis w/ contrast showed b/l lung mass, liver masses and  MRI brain showed frontal brain mass  - underwent bronch biopsy   - neurosurgery following appreciate their recs, they F/U with as an outpatient,  and no procedures to be done other than XRT treatment   - hem/onc following  - follow biopsy results still pending     Hyponatremia    Levels are mildly low  - Most likely due to SIADH    Plan:  - Fluid restriction to 1 L per day       Discharge planning issues    PT/OT recommended SNF      Testicle swelling    Unsure duration  - patient reported it was noticed by his PCP 6 month ago  - denies pain, reported difficulty urination, dysuria and dripping   - right testicular swelling, soft,non tender. Soft non tender prostate on exam.  - continue Flomax and finasteride  - U/s testicles consistent with right testicles hydrocele   - will arrange urology appointment upon discharge      Lab Results   Component Value Date    PSA 2.2 10/25/2018    PSA 6.9 (H) 05/18/2017        Vasogenic cerebral edema    Check brain mets     Lung mass    - Hem/Onc suspecting non small cell lung carcinoma (NSCLC), most likely an adenocarcinoma  - Biopsy and cytology still pending      Chest pain    - Described as sharp, radiating from his R chest across to the L, non-reproducible  - Reports that this is a chronic occurrence, comes and goes   - Concern for MSK vs ACS vs PE vs likely cancerous masses on CXR  - Obtained EKG, negative for STEMI;   - resumed aspirin 81 mg  --- resolved      Essential hypertension    - stable  - resume coreg 12.5 mg BID  - possibly secondary to cushing reflex, will continue to monitor  - SBP <200    Plan:  - Pt SBP is mildly elevated we will start amlodipine 5 mg QD      MCCRAY (dyspnea on exertion)    - Chronic likely secondary to COPD given CXR findings and smoking history  - Not on O2 at home  - duonebs PRN  - On tiotropium daily       Elevated PSA    - hx of elevated PSA  - nursing reports of urinary incontinence, dribbling, weak stream  - resume finasteride, tamsulosin     Coronary artery disease involving native heart    On coreg, Asprin and atorvastatin      Tobacco abuse    - acknowledges need to quit  - would  benefit from smoking cessation counseling  - nicotine patch.        VTE Risk Mitigation (From admission, onward)        Ordered     heparin (porcine) injection 5,000 Units  Every 8 hours      10/26/18 0915     IP VTE HIGH RISK PATIENT  Once      10/24/18 0342              Tawnya Corbin MD  Department of Hospital Medicine   [facility]                    10/29/2018                             STAFF PHYSICIAN NOTE                                   Attending Attestation for Rounds with Resident  I have reviewed and concur with the resident's history, physical, assessment, and plan.  I have personally interviewed and examined the patient at bedside and agree with the resident's findings.                                  ________________________________________                                     REASON FOR ADMISSION:     Patient is 67 y.o.male    Body mass index is 25.84 kg/m².,  Brain metastasis

## 2018-10-28 NOTE — ASSESSMENT & PLAN NOTE
- stable  - resume coreg 12.5 mg BID  - possibly secondary to cushing reflex, will continue to monitor  - SBP <200    Plan:  - Pt SBP is mildly elevated we will start amlodipine 5 mg QD

## 2018-10-28 NOTE — SUBJECTIVE & OBJECTIVE
Review of Systems   Constitutional: Negative for chills, diaphoresis, fatigue and fever.   HENT: Negative for sinus pressure, sinus pain and sore throat.    Eyes: Negative for pain and discharge.   Respiratory: Negative for cough, chest tightness, shortness of breath and wheezing.    Cardiovascular: Negative for chest pain, palpitations and leg swelling.   Gastrointestinal: Negative for abdominal distention, abdominal pain, blood in stool, constipation, diarrhea, nausea and vomiting.   Genitourinary: Positive for scrotal swelling. Negative for difficulty urinating, flank pain and hematuria.   Musculoskeletal: Negative for back pain (Mostly on the Rt shoulder ).        Burning pain in the Rt leg. It is a chronic issue.   Skin: Negative for color change and wound.   Neurological: Negative for dizziness, tremors, seizures, speech difficulty, numbness and headaches.   Hematological: Does not bruise/bleed easily.   Psychiatric/Behavioral: Positive for confusion. Negative for agitation.     Objective:     Vital Signs (Most Recent):  Temp: 97.9 °F (36.6 °C) (10/29/18 0723)  Pulse: 70 (10/29/18 0723)  Resp: 13 (10/29/18 0723)  BP: (!) 163/76 (10/29/18 0723)  SpO2: (!) 94 % (10/29/18 0723) Vital Signs (24h Range):  Temp:  [97.5 °F (36.4 °C)-98.7 °F (37.1 °C)] 97.9 °F (36.6 °C)  Pulse:  [52-70] 70  Resp:  [13-20] 13  SpO2:  [90 %-97 %] 94 %  BP: (112-163)/(54-76) 163/76     Weight: 74.8 kg (165 lb 0 oz)  Body mass index is 25.84 kg/m².    Intake/Output Summary (Last 24 hours) at 10/29/2018 1057  Last data filed at 10/29/2018 0900  Gross per 24 hour   Intake 1570 ml   Output 640 ml   Net 930 ml      Physical Exam   Constitutional: He is oriented to person, place, and time. He appears well-developed and well-nourished. No distress.   HENT:   Head: Normocephalic.   Cardiovascular: Normal rate, regular rhythm and normal heart sounds.   No murmur heard.  Pulmonary/Chest: Effort normal and breath sounds normal. He has no wheezes.  He has no rales. He exhibits no tenderness.   Abdominal: Soft. Bowel sounds are normal. He exhibits no distension. There is no tenderness.   Musculoskeletal: Normal range of motion. He exhibits no edema, tenderness or deformity.   Neurological: He is alert and oriented to person, place, and time.   Skin: He is not diaphoretic.       Significant Labs:   BMP:   Recent Labs   Lab 10/29/18  0349   *   *   K 3.8      CO2 22*   BUN 19   CREATININE 0.8   CALCIUM 8.6*   MG 1.9     CBC:   Recent Labs   Lab 10/28/18  0417 10/29/18  0349   WBC 10.68 13.93*   HGB 12.2* 12.9*   HCT 39.5* 41.0    256     CMP:   Recent Labs   Lab 10/28/18  0417 10/29/18  0349   * 133*   K 4.0 3.8    103   CO2 25 22*   * 155*   BUN 18 19   CREATININE 0.8 0.8   CALCIUM 8.4* 8.6*   PROT 6.1 6.0   ALBUMIN 2.4* 2.4*   BILITOT 0.3 0.3   ALKPHOS 62 74   AST 11 15   ALT 23 31   ANIONGAP 6* 8   EGFRNONAA >60.0 >60.0

## 2018-10-28 NOTE — ASSESSMENT & PLAN NOTE
Lung mass  Vasogenic cerebral edema  66 yo M w/ PMH of HTN, CAD s/p CABG, carotid artery disease s/p stenting, and elevated PSA who presents as transfer for evaluation of brain lesion w/ newly found mass-like consolidations on CXR. Concern for primary lung lesion w/ significant smoking history and brain metastasis w/ vasogenic edema. Mild expressive aphasia, R>L weakness, difficulty w/ mobility. Hypertensive concerning for Cushing reflex secondary to cerebral edema. HR wnl.   - IV decadron 4mg q6. Pt has mild increase in WBC mostly due to white shift from steroid usage.   - keppra 500 mg BID  - neurochecks q4  - CT Neck/chest/abdomen/pelvis w/ contrast showed b/l lung mass, liver masses and  MRI brain showed frontal brain mass  - underwent bronch biopsy   - neurosurgery following appreciate their recs, they F/U with as an outpatient, and no procedures to be done other than XRT treatment   - hem/onc following  - follow biopsy results still pending

## 2018-10-28 NOTE — ASSESSMENT & PLAN NOTE
- Hem/Onc suspecting non small cell lung carcinoma (NSCLC), most likely an adenocarcinoma  - Biopsy and cytology still pending

## 2018-10-28 NOTE — NURSING
Paged Med 1 related to pt's bp 112/54 and new order for Amlodipine and if ok to give. Waiting on return call. Received return call back from Med 1 stated ok to hold dose and recheck bp in about 2 hours.

## 2018-10-29 ENCOUNTER — TELEPHONE (OUTPATIENT)
Dept: NEUROSURGERY | Facility: CLINIC | Age: 68
End: 2018-10-29

## 2018-10-29 PROBLEM — Z78.9 IMPAIRED MOBILITY AND ACTIVITIES OF DAILY LIVING: Status: ACTIVE | Noted: 2018-10-29

## 2018-10-29 PROBLEM — Z74.09 IMPAIRED MOBILITY AND ACTIVITIES OF DAILY LIVING: Status: ACTIVE | Noted: 2018-10-29

## 2018-10-29 LAB
ALBUMIN SERPL BCP-MCNC: 2.4 G/DL
ALP SERPL-CCNC: 74 U/L
ALT SERPL W/O P-5'-P-CCNC: 31 U/L
ANION GAP SERPL CALC-SCNC: 8 MMOL/L
AST SERPL-CCNC: 15 U/L
BASOPHILS # BLD AUTO: 0.03 K/UL
BASOPHILS NFR BLD: 0.2 %
BILIRUB SERPL-MCNC: 0.3 MG/DL
BUN SERPL-MCNC: 19 MG/DL
CALCIUM SERPL-MCNC: 8.6 MG/DL
CHLORIDE SERPL-SCNC: 103 MMOL/L
CO2 SERPL-SCNC: 22 MMOL/L
CREAT SERPL-MCNC: 0.8 MG/DL
DIFFERENTIAL METHOD: ABNORMAL
EOSINOPHIL # BLD AUTO: 0.3 K/UL
EOSINOPHIL NFR BLD: 2.3 %
ERYTHROCYTE [DISTWIDTH] IN BLOOD BY AUTOMATED COUNT: 13.9 %
EST. GFR  (AFRICAN AMERICAN): >60 ML/MIN/1.73 M^2
EST. GFR  (NON AFRICAN AMERICAN): >60 ML/MIN/1.73 M^2
GLUCOSE SERPL-MCNC: 155 MG/DL
HCT VFR BLD AUTO: 41 %
HGB BLD-MCNC: 12.9 G/DL
IMM GRANULOCYTES # BLD AUTO: 0.12 K/UL
IMM GRANULOCYTES NFR BLD AUTO: 0.9 %
LYMPHOCYTES # BLD AUTO: 1.4 K/UL
LYMPHOCYTES NFR BLD: 10.1 %
MAGNESIUM SERPL-MCNC: 1.9 MG/DL
MCH RBC QN AUTO: 27 PG
MCHC RBC AUTO-ENTMCNC: 31.5 G/DL
MCV RBC AUTO: 86 FL
MONOCYTES # BLD AUTO: 0.7 K/UL
MONOCYTES NFR BLD: 4.7 %
NEUTROPHILS # BLD AUTO: 11.4 K/UL
NEUTROPHILS NFR BLD: 81.8 %
NRBC BLD-RTO: 0 /100 WBC
PHOSPHATE SERPL-MCNC: 2.6 MG/DL
PLATELET # BLD AUTO: 256 K/UL
PMV BLD AUTO: 10.7 FL
POCT GLUCOSE: 113 MG/DL (ref 70–110)
POCT GLUCOSE: 117 MG/DL (ref 70–110)
POCT GLUCOSE: 201 MG/DL (ref 70–110)
POCT GLUCOSE: 220 MG/DL (ref 70–110)
POTASSIUM SERPL-SCNC: 3.8 MMOL/L
PROT SERPL-MCNC: 6 G/DL
RBC # BLD AUTO: 4.77 M/UL
SODIUM SERPL-SCNC: 133 MMOL/L
WBC # BLD AUTO: 13.93 K/UL

## 2018-10-29 PROCEDURE — 25000242 PHARM REV CODE 250 ALT 637 W/ HCPCS: Performed by: STUDENT IN AN ORGANIZED HEALTH CARE EDUCATION/TRAINING PROGRAM

## 2018-10-29 PROCEDURE — 11000001 HC ACUTE MED/SURG PRIVATE ROOM

## 2018-10-29 PROCEDURE — 36415 COLL VENOUS BLD VENIPUNCTURE: CPT

## 2018-10-29 PROCEDURE — 25000003 PHARM REV CODE 250: Performed by: STUDENT IN AN ORGANIZED HEALTH CARE EDUCATION/TRAINING PROGRAM

## 2018-10-29 PROCEDURE — 63600175 PHARM REV CODE 636 W HCPCS: Performed by: STUDENT IN AN ORGANIZED HEALTH CARE EDUCATION/TRAINING PROGRAM

## 2018-10-29 PROCEDURE — 80053 COMPREHEN METABOLIC PANEL: CPT

## 2018-10-29 PROCEDURE — 97535 SELF CARE MNGMENT TRAINING: CPT

## 2018-10-29 PROCEDURE — 83735 ASSAY OF MAGNESIUM: CPT

## 2018-10-29 PROCEDURE — 99222 1ST HOSP IP/OBS MODERATE 55: CPT | Mod: ,,, | Performed by: NURSE PRACTITIONER

## 2018-10-29 PROCEDURE — 84100 ASSAY OF PHOSPHORUS: CPT

## 2018-10-29 PROCEDURE — 99231 SBSQ HOSP IP/OBS SF/LOW 25: CPT | Mod: ,,, | Performed by: PHYSICIAN ASSISTANT

## 2018-10-29 PROCEDURE — S4991 NICOTINE PATCH NONLEGEND: HCPCS | Performed by: STUDENT IN AN ORGANIZED HEALTH CARE EDUCATION/TRAINING PROGRAM

## 2018-10-29 PROCEDURE — 99232 SBSQ HOSP IP/OBS MODERATE 35: CPT | Mod: ,,, | Performed by: HOSPITALIST

## 2018-10-29 PROCEDURE — 85025 COMPLETE CBC W/AUTO DIFF WBC: CPT

## 2018-10-29 RX ORDER — RAMELTEON 8 MG/1
8 TABLET ORAL NIGHTLY PRN
Status: DISCONTINUED | OUTPATIENT
Start: 2018-10-29 | End: 2018-11-04 | Stop reason: HOSPADM

## 2018-10-29 RX ORDER — GABAPENTIN 300 MG/1
300 CAPSULE ORAL 3 TIMES DAILY
Status: DISCONTINUED | OUTPATIENT
Start: 2018-10-29 | End: 2018-11-04 | Stop reason: HOSPADM

## 2018-10-29 RX ADMIN — LEVETIRACETAM 500 MG: 500 TABLET ORAL at 09:10

## 2018-10-29 RX ADMIN — DEXAMETHASONE 4 MG: 1 TABLET ORAL at 12:10

## 2018-10-29 RX ADMIN — HEPARIN SODIUM 5000 UNITS: 5000 INJECTION, SOLUTION INTRAVENOUS; SUBCUTANEOUS at 09:10

## 2018-10-29 RX ADMIN — HEPARIN SODIUM 5000 UNITS: 5000 INJECTION, SOLUTION INTRAVENOUS; SUBCUTANEOUS at 02:10

## 2018-10-29 RX ADMIN — DEXAMETHASONE 4 MG: 1 TABLET ORAL at 05:10

## 2018-10-29 RX ADMIN — NICOTINE 1 PATCH: 14 PATCH, EXTENDED RELEASE TRANSDERMAL at 03:10

## 2018-10-29 RX ADMIN — RAMELTEON 8 MG: 8 TABLET, FILM COATED ORAL at 09:10

## 2018-10-29 RX ADMIN — GABAPENTIN 300 MG: 300 CAPSULE ORAL at 02:10

## 2018-10-29 RX ADMIN — DEXAMETHASONE 4 MG: 1 TABLET ORAL at 06:10

## 2018-10-29 RX ADMIN — ATORVASTATIN CALCIUM 80 MG: 20 TABLET, FILM COATED ORAL at 09:10

## 2018-10-29 RX ADMIN — INSULIN ASPART 1 UNITS: 100 INJECTION, SOLUTION INTRAVENOUS; SUBCUTANEOUS at 09:10

## 2018-10-29 RX ADMIN — HEPARIN SODIUM 5000 UNITS: 5000 INJECTION, SOLUTION INTRAVENOUS; SUBCUTANEOUS at 05:10

## 2018-10-29 RX ADMIN — AMLODIPINE BESYLATE 5 MG: 5 TABLET ORAL at 09:10

## 2018-10-29 RX ADMIN — ASPIRIN 81 MG: 81 TABLET, COATED ORAL at 09:10

## 2018-10-29 RX ADMIN — TAMSULOSIN HYDROCHLORIDE 0.4 MG: 0.4 CAPSULE ORAL at 09:10

## 2018-10-29 RX ADMIN — GABAPENTIN 300 MG: 300 CAPSULE ORAL at 09:10

## 2018-10-29 RX ADMIN — PANTOPRAZOLE SODIUM 40 MG: 40 TABLET, DELAYED RELEASE ORAL at 09:10

## 2018-10-29 RX ADMIN — DEXAMETHASONE 4 MG: 1 TABLET ORAL at 11:10

## 2018-10-29 RX ADMIN — INSULIN ASPART 2 UNITS: 100 INJECTION, SOLUTION INTRAVENOUS; SUBCUTANEOUS at 06:10

## 2018-10-29 RX ADMIN — FINASTERIDE 5 MG: 5 TABLET, FILM COATED ORAL at 09:10

## 2018-10-29 RX ADMIN — TIOTROPIUM BROMIDE 18 MCG: 18 CAPSULE ORAL; RESPIRATORY (INHALATION) at 03:10

## 2018-10-29 NOTE — ASSESSMENT & PLAN NOTE
- Related to prolonged/acute hospital course.     Recommendations  -  Encourage mobility, OOB in chair at least 3 hours per day, and early ambulation as appropriate  -  PT/OT evaluate and treat  -  Pain management  -  Monitor for and prevent skin breakdown and pressure ulcers  · Early mobility, repositioning/weight shifting every 20-30 minutes when sitting, turn patient every 2 hours, proper mattress/overlay and chair cushioning, pressure relief/heel protector boots  -  DVT prophylaxis    -  Reviewed discharge options (IP rehab, SNF, HH therapy, and OP therapy)

## 2018-10-29 NOTE — PLAN OF CARE
Problem: Occupational Therapy Goal  Goal: Occupational Therapy Goal  Goals to be met by: 11/20     Patient will increase functional independence with ADLs by performing:    UE Dressing with Set-up Assistance.  LE Dressing with Contact Guard Assistance.  Grooming while seated with Set-up Assistance.  Toileting from toilet with Contact Guard Assistance for hygiene and clothing management.      Pt. Tolerated session well.

## 2018-10-29 NOTE — SUBJECTIVE & OBJECTIVE
Interval History:   NAEON. Patient sitting comfortably in the chair. Continues to report dull, bifrontal headache that is unchanged. Denies any new symptoms.     Medications:  Continuous Infusions:  Scheduled Meds:   amLODIPine  5 mg Oral Daily    aspirin  81 mg Oral Daily    atorvastatin  80 mg Oral Daily    dexamethasone  4 mg Oral Q6H    finasteride  5 mg Oral Daily    heparin (porcine)  5,000 Units Subcutaneous Q8H    levETIRAcetam  500 mg Oral BID    nicotine  1 patch Transdermal Daily    pantoprazole  40 mg Oral Daily    tamsulosin  0.4 mg Oral Daily    tiotropium  1 capsule Inhalation Daily     PRN Meds:dextrose 50%, dextrose 50%, glucagon (human recombinant), glucose, glucose, hydrALAZINE, HYDROcodone-acetaminophen, insulin aspart U-100, sodium chloride 0.9%     Review of Systems  Objective:     Weight: 74.8 kg (165 lb 0 oz)  Body mass index is 25.84 kg/m².  Vital Signs (Most Recent):  Temp: 97.9 °F (36.6 °C) (10/29/18 1201)  Pulse: 72 (10/29/18 1201)  Resp: 17 (10/29/18 1201)  BP: (!) 162/79 (10/29/18 1201)  SpO2: 97 % (10/29/18 1201) Vital Signs (24h Range):  Temp:  [97.5 °F (36.4 °C)-98.5 °F (36.9 °C)] 97.9 °F (36.6 °C)  Pulse:  [56-72] 72  Resp:  [13-20] 17  SpO2:  [90 %-97 %] 97 %  BP: (115-163)/(57-79) 162/79     Date 10/29/18 0700 - 10/30/18 0659   Shift 9076-2788 4556-6274 6220-8362 24 Hour Total   INTAKE   P.O. 600   600   Shift Total(mL/kg) 600(8)   600(8)   OUTPUT   Urine(mL/kg/hr) 150   150   Shift Total(mL/kg) 150(2)   150(2)   Weight (kg) 74.8 74.8 74.8 74.8                   Male External Urinary Catheter 10/26/18 1510 (Active)   Collection Container Standard drainage bag 10/27/2018  7:30 AM   Securement Method secured to top of thigh w/ adhesive device 10/27/2018  7:30 AM   Skin no redness;no breakdown 10/27/2018  7:30 AM   Tolerance no signs/symptoms of discomfort 10/27/2018  7:30 AM   Output (mL) 400 mL 10/26/2018  6:00 PM   Catheter Change Date 10/26/18 10/26/2018  4:30 PM    Catheter Change Time 1600 10/26/2018  4:30 PM       Neurosurgery Physical Exam   General: well developed, well nourished, no distress  Head: normocephalic, atraumatic  Neurologic: Alert and oriented. Thought content appropriate  GCS: Motor: 6/Verbal: 5/Eyes: 4 GCS Total: 15  Mental Status: Awake, Alert, Oriented x 3  Language: No aphasia  Speech: No dysarthria  Cranial nerves: slight right facial droop, tongue midline, CN II-XII grossly intact.   Eyes: pupils equal, round, reactive to light with accommodation, EOMI  Pulmonary: normal respirations, not labored, no accessory muscles used  Abdomen: soft, non-distended, not tender to palpation  Sensory: intact to light touch throughout    Motor Strength: Moves all extremities spontaneously with good tone.   No abnormal movements seen.      Strength   Deltoids Triceps Biceps Wrist Extension Wrist Flexion Hand    Upper: R 4/5 4/5 4/5 4+/5 4+/5 4+/5     L 5/5 5/5 5/5 5/5 5/5 5/5       Iliopsoas Quadriceps Knee  Flexion Tibialis  anterior Gastro- cnemius EHL   Lower: R 4/5 4/5 4/5 4+/5 4+/5 4+/5     L 5/5 5/5 5/5 5/5 5/5 5/5      Pronator Drift: no drift noted   Finger-to-nose: Intact bilaterally  Clonus: absent  Babinski: absent  Skin: warm, dry and intact, no rashes            Significant Labs:  Recent Labs   Lab 10/28/18  0417 10/29/18  0349   * 155*   * 133*   K 4.0 3.8    103   CO2 25 22*   BUN 18 19   CREATININE 0.8 0.8   CALCIUM 8.4* 8.6*   MG 2.2 1.9     Recent Labs   Lab 10/28/18  0417 10/29/18  0349   WBC 10.68 13.93*   HGB 12.2* 12.9*   HCT 39.5* 41.0    256     No results for input(s): LABPT, INR, APTT in the last 48 hours.  Microbiology Results (last 7 days)     Procedure Component Value Units Date/Time    Urine culture [[id number]] Collected:  10/26/18 1054    Order Status:  Completed Specimen:  Urine, Clean Catch Updated:  10/27/18 1359     Urine Culture, Routine No growth

## 2018-10-29 NOTE — PLAN OF CARE
Family Stephanie called and wants  to make referral to [facility].  MSW Hawa notified.     10/29/18 3485   Right Care Assessment   Can the patient answer the patient profile reliably? Yes, cognitively intact   How often would a person be available to care for the patient? Often   Describe the patient's ability to walk at the present time. Major restrictions/daily assistance from another person   How does the patient rate their overall health at the present time? Fair   Number of comorbid conditions (as recorded on the chart) Three   During the past month, has the patient often been bothered by feeling down, depressed or hopeless? No   During the past month, has the patient often been bothered by little interest or pleasure in doing things? No

## 2018-10-29 NOTE — ASSESSMENT & PLAN NOTE
- CT head demonstrated vasogenic edema w/ 1 cm nodular lesion in the L frontal lobe.   - CXR was significant for mass like consolidations in the L lung. Complaint of chronic cough w/ occasional sputum.   - 10/25 underwent bronchoscopy, biopsy was taken from the his lung mass, bx still pending.   - Oncology following and suspecting non small cell lung carcinoma with metastases to brain, official pathology report pending.   - NSGY following and no acute neurosurgical intervention indicated, plan for outpt radisurgery.

## 2018-10-29 NOTE — TELEPHONE ENCOUNTER
Called pt's daughter. They think they want to proceed with [facility] but they need a few days as they do not have the bx dx yet. Stated she would call me.

## 2018-10-29 NOTE — PROGRESS NOTES
[facility]  Neurosurgery  Progress Note    Subjective:     History of Present Illness: Mr. Tran is a 67yoM with PMHx HTN, CAD s/p CABG, carotid artery disease s/p stent, who transferred from [facility] ED for altered mental status & generalized weakness, found to have a brain lesion on CTH.  He was transferred to [facility] for Nsurg evaluation.  History is obtained from patient & chart, as patient continues to have AMS.  Per chart, his family brought him in after he attempted to take a bath & was unable to get out of the bathtub and his family did not feel he was acting himself.  He reports baseline RLE weakness due to a work accident many years ago, but feels RUE weakness is new.  Patient does have a smoking history & was found to have lung mass, as well as liver lesions.  He is not on any antiplatelet or anticoagulant agents.  Unknown if prior stroke, no family available bedside.    Post-Op Info:  * No surgery found *         Interval History:   NAEON. Patient sitting comfortably in the chair. Continues to report dull, bifrontal headache that is unchanged. Denies any new symptoms.     Medications:  Continuous Infusions:  Scheduled Meds:   amLODIPine  5 mg Oral Daily    aspirin  81 mg Oral Daily    atorvastatin  80 mg Oral Daily    dexamethasone  4 mg Oral Q6H    finasteride  5 mg Oral Daily    heparin (porcine)  5,000 Units Subcutaneous Q8H    levETIRAcetam  500 mg Oral BID    nicotine  1 patch Transdermal Daily    pantoprazole  40 mg Oral Daily    tamsulosin  0.4 mg Oral Daily    tiotropium  1 capsule Inhalation Daily     PRN Meds:dextrose 50%, dextrose 50%, glucagon (human recombinant), glucose, glucose, hydrALAZINE, HYDROcodone-acetaminophen, insulin aspart U-100, sodium chloride 0.9%     Review of Systems  Objective:     Weight: 74.8 kg (165 lb 0 oz)  Body mass index is 25.84 kg/m².  Vital Signs (Most Recent):  Temp: 97.9 °F (36.6 °C) (10/29/18 1201)  Pulse: 72 (10/29/18  1201)  Resp: 17 (10/29/18 1201)  BP: (!) 162/79 (10/29/18 1201)  SpO2: 97 % (10/29/18 1201) Vital Signs (24h Range):  Temp:  [97.5 °F (36.4 °C)-98.5 °F (36.9 °C)] 97.9 °F (36.6 °C)  Pulse:  [56-72] 72  Resp:  [13-20] 17  SpO2:  [90 %-97 %] 97 %  BP: (115-163)/(57-79) 162/79     Date 10/29/18 0700 - 10/30/18 0659   Shift 4777-3460 0097-9569 3516-1087 24 Hour Total   INTAKE   P.O. 600   600   Shift Total(mL/kg) 600(8)   600(8)   OUTPUT   Urine(mL/kg/hr) 150   150   Shift Total(mL/kg) 150(2)   150(2)   Weight (kg) 74.8 74.8 74.8 74.8                   Male External Urinary Catheter 10/26/18 1510 (Active)   Collection Container Standard drainage bag 10/27/2018  7:30 AM   Securement Method secured to top of thigh w/ adhesive device 10/27/2018  7:30 AM   Skin no redness;no breakdown 10/27/2018  7:30 AM   Tolerance no signs/symptoms of discomfort 10/27/2018  7:30 AM   Output (mL) 400 mL 10/26/2018  6:00 PM   Catheter Change Date 10/26/18 10/26/2018  4:30 PM   Catheter Change Time 1600 10/26/2018  4:30 PM       Neurosurgery Physical Exam   General: well developed, well nourished, no distress  Head: normocephalic, atraumatic  Neurologic: Awake, oriented, responses delayed.   GCS: Motor: 6/Verbal: 4/Eyes: 4 GCS Total: 14  Mental Status: Awake, Alert, Oriented x 3. Responses delayed.   Language: mild aphasia  Speech: mild dysarthria  Cranial nerves: slight right facial droop, tongue midline, CN II-XII grossly intact.   Eyes: pupils equal, round, reactive to light with accommodation, EOMI  Pulmonary: normal respirations, not labored, no accessory muscles used  Abdomen: soft, non-distended, not tender to palpation  Sensory: intact to light touch throughout    Motor Strength: Moves all extremities spontaneously with good tone.   No abnormal movements seen.      Strength   Deltoids Triceps Biceps Wrist Extension Wrist Flexion Hand    Upper: R 4/5 4/5 4/5 4+/5 4+/5 4+/5     L 5/5 5/5 5/5 5/5 5/5 5/5       Iliopsoas Quadriceps  Knee  Flexion Tibialis  anterior Gastro- cnemius EHL   Lower: R 4/5 4/5 4/5 4+/5 4+/5 4+/5     L 5/5 5/5 5/5 5/5 5/5 5/5      Pronator Drift: no drift noted   Finger-to-nose: Intact bilaterally  Clonus: absent  Babinski: absent  Skin: warm, dry and intact, no rashes            Significant Labs:  Recent Labs   Lab 10/28/18  0417 10/29/18  0349   * 155*   * 133*   K 4.0 3.8    103   CO2 25 22*   BUN 18 19   CREATININE 0.8 0.8   CALCIUM 8.4* 8.6*   MG 2.2 1.9     Recent Labs   Lab 10/28/18  0417 10/29/18  0349   WBC 10.68 13.93*   HGB 12.2* 12.9*   HCT 39.5* 41.0    256     No results for input(s): LABPT, INR, APTT in the last 48 hours.  Microbiology Results (last 7 days)     Procedure Component Value Units Date/Time    Urine culture [[id number]] Collected:  10/26/18 1054    Order Status:  Completed Specimen:  Urine, Clean Catch Updated:  10/27/18 1359     Urine Culture, Routine No growth            Assessment/Plan:     * Brain metastasis    67 year old male with PMHx HTN, CAD s/p CABG, carotid artery disease s/p stenting, BPH, who presented to [facility] with AMS and found to have left frontal brain mass, likely metastasis    -Patient neurologically stable on exam  -No acute neurosurgical intervention indicated. Recommend outpaitent radiosurgery with Dr. De Jesus. NSGY will set this up.    -Continue dex for cerebral edema. Currently on 4 mg q 6 hours. Recommend 2 week taper down to 2 mg BID, if ok per primary team and HemeOnc.  -Continue Keppra 500mg for seizure prophylaxis   -Maintain SBP <160  -Pulm lesion: s/p bronch on 10/25. Path shows non-small cell carcinoma. HemeOnc on board.   -Will sign off. Please reconsult if NSGY can be of any further assistance.          Discussed with Dr. De Jesus  Please call with any questions      Mickie Avitia PA-C   Neurosurgery   Pager: [phone]

## 2018-10-29 NOTE — PT/OT/SLP PROGRESS
Occupational Therapy   Treatment    Name: Lorenzo Tran Sr.  MRN: [id number]  Admitting Diagnosis:  Brain metastasis       Recommendations:     Discharge Recommendations: rehabilitation facility  Discharge Equipment Recommendations:  walker, rolling, cane, straight  Barriers to discharge:  Inaccessible home environment, Decreased caregiver support    Subjective     Communicated with: nurse prior to session.  Pain/Comfort:  · Pain Rating 1: 0/10  · Pain Rating Post-Intervention 1: 0/10    Patients cultural, spiritual, Tenriism conflicts given the current situation: none stated    Objective:     Patient found with: (supine in bed)    General Precautions: Standard, fall   Orthopedic Precautions:N/A   Braces:       Occupational Performance:    Bed Mobility:    · Patient completed Supine to Sit with minimum assistance     Functional Mobility/Transfers:  · Patient completed Sit <> Stand Transfer with minimum assistance  with  rolling walker   · Patient completed Bed <> Chair Transfer using Stand Pivot technique with minimum assistance with rolling walker  · Functional Mobility: Pt. Ambulated to and from the bathroom with Min A and RW    Activities of Daily Living:  · Grooming: contact guard assistance in stand at sink to clean dentures and teeth  · Upper Body Dressing: minimum assistance to guide gown around back  · Lower Body Dressing: total assistance to don socks  · Toileting: minimum assistance to stand and urinate in toilet while holding grab bar    Patient left up in chair with call button in reach and nurse notified    AMPAC 6 Click:  AMPAC Total Score: 19    Treatment & Education:  Pt. Educated on safety with functional mobility as well as self-care skills    Education:    Assessment:     Lorenzo Tran Sr. is a 67 y.o. male with a medical diagnosis of Brain metastasis.  He presents with deficits with functional mobility, self-care skills.  Pt. Is a fall risk.  Pt. Tolerated session fairly on this  date. .  Performance deficits affecting function are impaired endurance, impaired functional mobilty, impaired self care skills, impaired balance.      Rehab Prognosis:  Good  ; patient would benefit from acute skilled OT services to address these deficits and reach maximum level of function.       Plan:     Patient to be seen 3 x/week to address the above listed problems via self-care/home management, therapeutic activities, therapeutic exercises  · Plan of Care Expires: 11/26/18  · Plan of Care Reviewed with: patient    This Plan of care has been discussed with the patient who was involved in its development and understands and is in agreement with the identified goals and treatment plan    GOALS:   Multidisciplinary Problems     Occupational Therapy Goals        Problem: Occupational Therapy Goal    Goal Priority Disciplines Outcome Interventions   Occupational Therapy Goal     OT, PT/OT Ongoing (interventions implemented as appropriate)    Description:  Goals to be met by: 11/20     Patient will increase functional independence with ADLs by performing:    UE Dressing with Set-up Assistance.  LE Dressing with Contact Guard Assistance.  Grooming while seated with Set-up Assistance.  Toileting from toilet with Contact Guard Assistance for hygiene and clothing management.                       Time Tracking:     OT Date of Treatment: 10/29/18  OT Start Time: 1002  OT Stop Time: 1028  OT Total Time (min): 26 min    Billable Minutes:Self Care/Home Management 26    LUIS Jo  10/29/2018

## 2018-10-29 NOTE — ASSESSMENT & PLAN NOTE
67 year old male with PMHx HTN, CAD s/p CABG, carotid artery disease s/p stenting, BPH, who presented to [facility] with AMS and found to have left frontal brain mass, likely metastasis    -Patient neurologically stable on exam  -No acute neurosurgical intervention indicated. Recommend outpaitent radiosurgery with Dr. De Jesus. [facility] will set this up.    -Continue dex for cerebral edema. Currently on 4 mg q 6 hours. Recommend 2 week taper down to 2 mg BID, if ok per primary team and HemeOnc.  -Continue Keppra 500mg for seizure prophylaxis   -Maintain SBP <160  -Pulm lesion: s/p bronch on 10/25. Path shows non-small cell carcinoma. HemeOnc on board

## 2018-10-29 NOTE — ASSESSMENT & PLAN NOTE
- 10/25 underwent bronchoscopy, biopsy was taken from the his lung mass, bx still pending.   - Oncology following and suspecting non small cell lung carcinoma with metastases to brain, official pathology report pending.

## 2018-10-29 NOTE — PLAN OF CARE
Problem: Patient Care Overview  Goal: Plan of Care Review  Outcome: Ongoing (interventions implemented as appropriate)   10/29/18 1748   Coping/Psychosocial   Plan Of Care Reviewed With patient;daughter       Problem: Fall Risk (Adult)  Goal: Identify Related Risk Factors and Signs and Symptoms  Related risk factors and signs and symptoms are identified upon initiation of Human Response Clinical Practice Guideline (CPG)  Outcome: Ongoing (interventions implemented as appropriate)   10/29/18 1748   Fall Risk   Related Risk Factors (Fall Risk) confusion/agitation;bladder function altered;culprit medication(s);polypharmacy   Signs and Symptoms (Fall Risk) presence of risk factors       Problem: Pressure Ulcer Risk (Avel Scale) (Adult,Obstetrics,Pediatric)  Goal: Identify Related Risk Factors and Signs and Symptoms  Related risk factors and signs and symptoms are identified upon initiation of Human Response Clinical Practice Guideline (CPG)  Outcome: Ongoing (interventions implemented as appropriate)   10/29/18 1748   Pressure Ulcer Risk (Avel Scale)   Related Risk Factors (Pressure Ulcer Risk (Avel Scale)) cognitive impairment;medication

## 2018-10-29 NOTE — PROGRESS NOTES
[facility]  Neurosurgery  Progress Note    Subjective:     History of Present Illness: Mr. Tran is a 67yoM with PMHx HTN, CAD s/p CABG, carotid artery disease s/p stent, who transferred from [facility] ED for altered mental status & generalized weakness, found to have a brain lesion on CTH.  He was transferred to [facility] for Nsurg evaluation.  History is obtained from patient & chart, as patient continues to have AMS.  Per chart, his family brought him in after he attempted to take a bath & was unable to get out of the bathtub and his family did not feel he was acting himself.  He reports baseline RLE weakness due to a work accident many years ago, but feels RUE weakness is new.  Patient does have a smoking history & was found to have lung mass, as well as liver lesions.  He is not on any antiplatelet or anticoagulant agents.  Unknown if prior stroke, no family available bedside.    Post-Op Info:  * No surgery found *         Interval History: 10/28: SARINA SANTOS, exam stable w/ continued RUE weakness, will continue to follow    Medications:  Continuous Infusions:  Scheduled Meds:   amLODIPine  5 mg Oral Daily    aspirin  81 mg Oral Daily    atorvastatin  80 mg Oral Daily    dexamethasone  4 mg Oral Q6H    finasteride  5 mg Oral Daily    gabapentin  300 mg Oral TID    heparin (porcine)  5,000 Units Subcutaneous Q8H    levETIRAcetam  500 mg Oral BID    nicotine  1 patch Transdermal Daily    pantoprazole  40 mg Oral Daily    tamsulosin  0.4 mg Oral Daily    tiotropium  1 capsule Inhalation Daily     PRN Meds:dextrose 50%, dextrose 50%, glucagon (human recombinant), glucose, glucose, hydrALAZINE, HYDROcodone-acetaminophen, insulin aspart U-100, sodium chloride 0.9%     Review of Systems    Objective:     Weight: 74.8 kg (165 lb 0 oz)  Body mass index is 25.84 kg/m².  Vital Signs (Most Recent):  Temp: 97.9 °F (36.6 °C) (10/29/18 1201)  Pulse: 72 (10/29/18 1201)  Resp: 17 (10/29/18  1201)  BP: (!) 162/79 (10/29/18 1201)  SpO2: 97 % (10/29/18 1201) Vital Signs (24h Range):  Temp:  [97.5 °F (36.4 °C)-98.5 °F (36.9 °C)] 97.9 °F (36.6 °C)  Pulse:  [56-72] 72  Resp:  [13-20] 17  SpO2:  [90 %-97 %] 97 %  BP: (115-163)/(57-79) 162/79     Date 10/29/18 0700 - 10/30/18 0659   Shift 1268-0203 8877-5851 6456-9755 24 Hour Total   INTAKE   P.O. 600   600   Shift Total(mL/kg) 600(8)   600(8)   OUTPUT   Urine(mL/kg/hr) 150   150   Shift Total(mL/kg) 150(2)   150(2)   Weight (kg) 74.8 74.8 74.8 74.8                   Male External Urinary Catheter 10/26/18 1510 (Active)   Collection Container Standard drainage bag 10/27/2018  7:30 AM   Securement Method secured to top of thigh w/ adhesive device 10/27/2018  7:30 AM   Skin no redness;no breakdown 10/27/2018  7:30 AM   Tolerance no signs/symptoms of discomfort 10/27/2018  7:30 AM   Output (mL) 400 mL 10/26/2018  6:00 PM   Catheter Change Date 10/26/18 10/26/2018  4:30 PM   Catheter Change Time 1600 10/26/2018  4:30 PM       Neurosurgery Physical Exam     General: well developed, well nourished, no distress  Head: normocephalic, atraumatic  Neurologic: Alert and oriented. Thought content appropriate  GCS: Motor: 6/Verbal: 4/Eyes: 4 GCS Total: 14  Mental Status: Awake, Alert, Oriented x 3- unaware of why he is in the hospital   Language: No aphasia  Speech: No dysarthria  Cranial nerves: slight right facial droop, tongue midline, CN II-XII grossly intact.   Eyes: pupils equal, round, reactive to light with accommodation, EOMI  Pulmonary: normal respirations, not labored, no accessory muscles used  Abdomen: soft, non-distended, not tender to palpation  Sensory: intact to light touch throughout  Motor Strength: Moves all extremities spontaneously with good tone.   No abnormal movements seen.      Strength   Deltoids Triceps Biceps Wrist Extension Wrist Flexion Hand    Upper: R 4/5 4/5 4/5 4/5 4/5 4+/5     L 5/5 5/5 5/5 5/5 5/5 5/5       Iliopsoas Quadriceps  Knee  Flexion Tibialis  anterior Gastro- cnemius EHL   Lower: R 4/5 4/5 4/5 4/5 4/5 4/5     L 5/5 5/5 5/5 5/5 5/5 5/5      Pronator Drift: no drift noted   Finger-to-nose: Intact bilaterally  Singh: absent  Clonus: absent  Babinski: absent  Pulses: 2+ and symmetric radial and dorsalis pedis  Skin: warm, dry and intact, no rashes             Significant Labs:  Recent Labs   Lab 10/28/18  0417 10/29/18  0349   * 155*   * 133*   K 4.0 3.8    103   CO2 25 22*   BUN 18 19   CREATININE 0.8 0.8   CALCIUM 8.4* 8.6*   MG 2.2 1.9     Recent Labs   Lab 10/28/18  0417 10/29/18  0349   WBC 10.68 13.93*   HGB 12.2* 12.9*   HCT 39.5* 41.0    256     No results for input(s): LABPT, INR, APTT in the last 48 hours.  Microbiology Results (last 7 days)     Procedure Component Value Units Date/Time    Urine culture [[id number]] Collected:  10/26/18 1054    Order Status:  Completed Specimen:  Urine, Clean Catch Updated:  10/27/18 1359     Urine Culture, Routine No growth          Significant Diagnostics:  No new imaging    Assessment/Plan:     * Brain metastasis    67 year old male with PMHx HTN, CAD s/p CABG, carotid artery disease s/p stenting, BPH, who presented to [facility] with AMS and found to have left frontal brain mass, likely metastasis    -Patient neurologically stable on exam  -No acute neurosurgical intervention indicated. Recommend outpaitent radiosurgery with Dr. De Jesus. [facility] will set this up.    -Continue dex for cerebral edema. Currently on 4 mg q 6 hours. Recommend 2 week taper down to 2 mg BID, if ok per primary team and HemeOn.  -Continue Keppra 500mg for seizure prophylaxis   -Maintain SBP <160  -Pulm lesion: s/p bronch on 10/25. Path shows non-small cell carcinoma. HemeOnc on board  -Will continue to follow at this time           Pato Garcia MD  Neurosurgery  [facility]

## 2018-10-29 NOTE — HPI
Lorenzo Tran is a 67-year-old male with PMHx of HTN, CAD s/p CABG, carotid artery disease s/p stenting, baseline RLE weakness due to work accident x years. Patient presented to [facility] w/ altered mental status and generalized weakness (new RUE weakness). CT head demonstrated vasogenic edema w/ 1 cm nodular lesion in the L frontal lobe. CXR was significant for mass like consolidations in the L lung. Complaint of chronic cough w/ occasional sputum. Transferred to [facility] 10/24/18. 10/25 underwent bronchoscopy, biopsy was taken from the his lung mass, bx still pending. Oncology following and suspecting non small cell lung carcinoma with metastases to brain, official pathology report pending. NSGY following and no acute neurosurgical intervention indicated, plan for outpt radisurgery. Hospital course complicated by testicular swelling (US consistent with right testicles hydrocele, f/u out pt urology)    Functional History: Patient lives in [place] with wife who does not drive and no car access for patient. Will require transportation.  Prior to admission, (I). DME: none.

## 2018-10-29 NOTE — HOSPITAL COURSE
10/26/2018: Evaluated by OT.  Bed mobility Jeffrey.  Transfers Jeffrey Adl's Jeffrey.   10/29/2018: Participated with OT.  Bed mobility Jeffrey.  Sit to stand Jeffrey w/ RW. Transfers Jeffrey w/ RW. Grooming CGA. UBD Jeffrey. LBD totalA. Toileting Jeffrey.

## 2018-10-29 NOTE — SUBJECTIVE & OBJECTIVE
Interval History: 10/28: SARINA SANTOS, exam stable w/ continued RUE weakness, will continue to follow    Medications:  Continuous Infusions:  Scheduled Meds:   amLODIPine  5 mg Oral Daily    aspirin  81 mg Oral Daily    atorvastatin  80 mg Oral Daily    dexamethasone  4 mg Oral Q6H    finasteride  5 mg Oral Daily    gabapentin  300 mg Oral TID    heparin (porcine)  5,000 Units Subcutaneous Q8H    levETIRAcetam  500 mg Oral BID    nicotine  1 patch Transdermal Daily    pantoprazole  40 mg Oral Daily    tamsulosin  0.4 mg Oral Daily    tiotropium  1 capsule Inhalation Daily     PRN Meds:dextrose 50%, dextrose 50%, glucagon (human recombinant), glucose, glucose, hydrALAZINE, HYDROcodone-acetaminophen, insulin aspart U-100, sodium chloride 0.9%     Review of Systems    Objective:     Weight: 74.8 kg (165 lb 0 oz)  Body mass index is 25.84 kg/m².  Vital Signs (Most Recent):  Temp: 97.9 °F (36.6 °C) (10/29/18 1201)  Pulse: 72 (10/29/18 1201)  Resp: 17 (10/29/18 1201)  BP: (!) 162/79 (10/29/18 1201)  SpO2: 97 % (10/29/18 1201) Vital Signs (24h Range):  Temp:  [97.5 °F (36.4 °C)-98.5 °F (36.9 °C)] 97.9 °F (36.6 °C)  Pulse:  [56-72] 72  Resp:  [13-20] 17  SpO2:  [90 %-97 %] 97 %  BP: (115-163)/(57-79) 162/79     Date 10/29/18 0700 - 10/30/18 0659   Shift 7765-5509 5461-7892 7743-5101 24 Hour Total   INTAKE   P.O. 600   600   Shift Total(mL/kg) 600(8)   600(8)   OUTPUT   Urine(mL/kg/hr) 150   150   Shift Total(mL/kg) 150(2)   150(2)   Weight (kg) 74.8 74.8 74.8 74.8                   Male External Urinary Catheter 10/26/18 1510 (Active)   Collection Container Standard drainage bag 10/27/2018  7:30 AM   Securement Method secured to top of thigh w/ adhesive device 10/27/2018  7:30 AM   Skin no redness;no breakdown 10/27/2018  7:30 AM   Tolerance no signs/symptoms of discomfort 10/27/2018  7:30 AM   Output (mL) 400 mL 10/26/2018  6:00 PM   Catheter Change Date 10/26/18 10/26/2018  4:30 PM   Catheter Change Time 1600  10/26/2018  4:30 PM       Neurosurgery Physical Exam     General: well developed, well nourished, no distress  Head: normocephalic, atraumatic  Neurologic: Alert and oriented. Thought content appropriate  GCS: Motor: 6/Verbal: 4/Eyes: 4 GCS Total: 14  Mental Status: Awake, Alert, Oriented x 3- unaware of why he is in the hospital   Language: No aphasia  Speech: No dysarthria  Cranial nerves: slight right facial droop, tongue midline, CN II-XII grossly intact.   Eyes: pupils equal, round, reactive to light with accommodation, EOMI  Pulmonary: normal respirations, not labored, no accessory muscles used  Abdomen: soft, non-distended, not tender to palpation  Sensory: intact to light touch throughout  Motor Strength: Moves all extremities spontaneously with good tone.   No abnormal movements seen.      Strength   Deltoids Triceps Biceps Wrist Extension Wrist Flexion Hand    Upper: R 4/5 4/5 4/5 4/5 4/5 4+/5     L 5/5 5/5 5/5 5/5 5/5 5/5       Iliopsoas Quadriceps Knee  Flexion Tibialis  anterior Gastro- cnemius EHL   Lower: R 4/5 4/5 4/5 4/5 4/5 4/5     L 5/5 5/5 5/5 5/5 5/5 5/5      Pronator Drift: no drift noted   Finger-to-nose: Intact bilaterally  Singh: absent  Clonus: absent  Babinski: absent  Pulses: 2+ and symmetric radial and dorsalis pedis  Skin: warm, dry and intact, no rashes             Significant Labs:  Recent Labs   Lab 10/28/18  0417 10/29/18  0349   * 155*   * 133*   K 4.0 3.8    103   CO2 25 22*   BUN 18 19   CREATININE 0.8 0.8   CALCIUM 8.4* 8.6*   MG 2.2 1.9     Recent Labs   Lab 10/28/18  0417 10/29/18  0349   WBC 10.68 13.93*   HGB 12.2* 12.9*   HCT 39.5* 41.0    256     No results for input(s): LABPT, INR, APTT in the last 48 hours.  Microbiology Results (last 7 days)     Procedure Component Value Units Date/Time    Urine culture [[id number]] Collected:  10/26/18 1054    Order Status:  Completed Specimen:  Urine, Clean Catch Updated:  10/27/18 5495     Urine  Culture, Routine No growth          Significant Diagnostics:  No new imaging

## 2018-10-29 NOTE — SUBJECTIVE & OBJECTIVE
Past Medical History:   Diagnosis Date    Hypertension     PSA elevation     Vitamin D insufficiency      Past Surgical History:   Procedure Laterality Date    CARDIAC SURGERY      had stents put in neck    CAROTID STENT Bilateral     CORONARY ARTERY BYPASS GRAFT      right heel      right hip      right leg       stents put in both legs       Review of patient's allergies indicates:  No Known Allergies    Scheduled Medications:    amLODIPine  5 mg Oral Daily    aspirin  81 mg Oral Daily    atorvastatin  80 mg Oral Daily    dexamethasone  4 mg Oral Q6H    finasteride  5 mg Oral Daily    gabapentin  300 mg Oral TID    heparin (porcine)  5,000 Units Subcutaneous Q8H    levETIRAcetam  500 mg Oral BID    nicotine  1 patch Transdermal Daily    pantoprazole  40 mg Oral Daily    tamsulosin  0.4 mg Oral Daily    tiotropium  1 capsule Inhalation Daily       PRN Medications: dextrose 50%, dextrose 50%, glucagon (human recombinant), glucose, glucose, hydrALAZINE, HYDROcodone-acetaminophen, insulin aspart U-100, sodium chloride 0.9%    Family History     Problem Relation (Age of Onset)    Cancer Father, Brother    Diabetes Brother    No Known Problems Mother        Tobacco Use    Smoking status: Current Every Day Smoker     Types: Cigars    Smokeless tobacco: Never Used   Substance and Sexual Activity    Alcohol use: No    Drug use: No    Sexual activity: Not on file     Review of Systems   Constitutional: Negative for fatigue and fever.   HENT: Negative for trouble swallowing and voice change.    Respiratory: Positive for cough. Negative for shortness of breath.    Cardiovascular: Negative for chest pain and leg swelling.   Gastrointestinal: Negative for abdominal distention and abdominal pain.   Genitourinary: Negative for difficulty urinating and flank pain.   Musculoskeletal: Positive for gait problem. Negative for back pain.   Skin: Negative for color change and rash.   Neurological: Positive for  weakness and numbness (RLE and R hip). Negative for speech difficulty.   Psychiatric/Behavioral: Positive for confusion. Negative for agitation.     Objective:     Vital Signs (Most Recent):  Temp: 97.9 °F (36.6 °C) (10/29/18 1201)  Pulse: 72 (10/29/18 1201)  Resp: 17 (10/29/18 1201)  BP: (!) 162/79 (10/29/18 1201)  SpO2: 97 % (10/29/18 1201)    Vital Signs (24h Range):  Temp:  [97.5 °F (36.4 °C)-98.5 °F (36.9 °C)] 97.9 °F (36.6 °C)  Pulse:  [56-72] 72  Resp:  [13-20] 17  SpO2:  [90 %-97 %] 97 %  BP: (115-163)/(57-79) 162/79     Body mass index is 25.84 kg/m².    Physical Exam   Constitutional: He appears well-developed and well-nourished.   HENT:   Head: Normocephalic and atraumatic.   Eyes: EOM are normal. Pupils are equal, round, and reactive to light. Right eye exhibits no discharge. Left eye exhibits no discharge.   Neck: Neck supple.   Pulmonary/Chest: Effort normal. No respiratory distress.   Abdominal: He exhibits no distension. There is no tenderness.   Musculoskeletal: He exhibits no edema or deformity.   Neurological:   -  Mental Status: Awake and alert. Slow to answer questions. Mild confusion present.   -  Vision: blurry vision improves with glasses  - Motor: RUE: 4/5.  LUE: 5/5.  RLE: 4/5.  LLE: 4/5.  -  Tone: normal  -  Sensory:  Intact to light touch and pin prick.   Skin: Skin is warm and dry.   Psychiatric: He has a normal mood and affect. His behavior is normal.     NEUROLOGICAL EXAMINATION:     CRANIAL NERVES     CN III, IV, VI   Pupils are equal, round, and reactive to light.  Extraocular motions are normal.       Diagnostic Results:   Labs: Reviewed  ECG: Reviewed  CT: Reviewed

## 2018-10-29 NOTE — CONSULTS
[facility]  Physical Medicine & Rehab  Consult Note    Patient Name: Lorenzo Tran Sr.  MRN: [id number]  Admission Date: 10/23/2018  Hospital Length of Stay: 6 days  Attending Physician: Berenice Shipman MD     Inpatient consult to Physical Medicine & Rehabilitation  Consult performed by: Za Neves NP  Consult requested by:  Berenice Shipman MD    Collaborating Physician: Sera Weathers MD  Reason for Consult:  Assess rehabilitation needs     Consults  Subjective:     Principal Problem: Brain metastasis    HPI: Lorenzo Tran is a 67-year-old male with PMHx of HTN, CAD s/p CABG, carotid artery disease s/p stenting, baseline RLE weakness due to work accident x years. Patient presented to [facility] w/ altered mental status and generalized weakness (new RUE weakness). CT head demonstrated vasogenic edema w/ 1 cm nodular lesion in the L frontal lobe. CXR was significant for mass like consolidations in the L lung. Complaint of chronic cough w/ occasional sputum. [place] [facility] [place]. 10/25 underwent bronchoscopy, biopsy was taken from the his lung mass, bx still pending. Oncology following and suspecting non small cell lung carcinoma with metastases to brain, official pathology report pending. NSGY following and no acute neurosurgical intervention indicated, plan for outpt radisurgery. Hospital course complicated by testicular swelling (US consistent with right testicle hydrocele, f/u out pt urology).    Functional History: Patient lives in [place] with wife who does not drive and no car access for patient. Will require transportation.  Prior to admission, (I). DME: none.     Hospital Course:   10/26/2018: Evaluated by OT.  Bed mobility Jeffrey.  Transfers Jeffrey Adl's Jeffrey.   10/29/2018: Participated with OT.  Bed mobility Jeffrey.  Sit to stand Jeffrey w/ RW. Transfers Jeffrey w/ RW. Grooming CGA. UBD Jeffrey. LBD totalA. Toileting Jeffrey.     Past Medical History:   Diagnosis Date    Hypertension      PSA elevation     Vitamin D insufficiency      Past Surgical History:   Procedure Laterality Date    CARDIAC SURGERY      had stents put in neck    CAROTID STENT Bilateral     CORONARY ARTERY BYPASS GRAFT      right heel      right hip      right leg       stents put in both legs       Review of patient's allergies indicates:  No Known Allergies    Scheduled Medications:    amLODIPine  5 mg Oral Daily    aspirin  81 mg Oral Daily    atorvastatin  80 mg Oral Daily    dexamethasone  4 mg Oral Q6H    finasteride  5 mg Oral Daily    gabapentin  300 mg Oral TID    heparin (porcine)  5,000 Units Subcutaneous Q8H    levETIRAcetam  500 mg Oral BID    nicotine  1 patch Transdermal Daily    pantoprazole  40 mg Oral Daily    tamsulosin  0.4 mg Oral Daily    tiotropium  1 capsule Inhalation Daily       PRN Medications: dextrose 50%, dextrose 50%, glucagon (human recombinant), glucose, glucose, hydrALAZINE, HYDROcodone-acetaminophen, insulin aspart U-100, sodium chloride 0.9%    Family History     Problem Relation (Age of Onset)    Cancer Father, Brother    Diabetes Brother    No Known Problems Mother        Tobacco Use    Smoking status: Current Every Day Smoker     Types: Cigars    Smokeless tobacco: Never Used   Substance and Sexual Activity    Alcohol use: No    Drug use: No    Sexual activity: Not on file     Review of Systems   Constitutional: Negative for fatigue and fever.   HENT: Negative for trouble swallowing and voice change.    Respiratory: Positive for cough. Negative for shortness of breath.    Cardiovascular: Negative for chest pain and leg swelling.   Gastrointestinal: Negative for abdominal distention and abdominal pain.   Genitourinary: Negative for difficulty urinating and flank pain.   Musculoskeletal: Positive for gait problem. Negative for back pain.   Skin: Negative for color change and rash.   Neurological: Positive for weakness and numbness (RLE and R hip). Negative for  speech difficulty.   Psychiatric/Behavioral: Positive for confusion. Negative for agitation.     Objective:     Vital Signs (Most Recent):  Temp: 97.9 °F (36.6 °C) (10/29/18 1201)  Pulse: 72 (10/29/18 1201)  Resp: 17 (10/29/18 1201)  BP: (!) 162/79 (10/29/18 1201)  SpO2: 97 % (10/29/18 1201)    Vital Signs (24h Range):  Temp:  [97.5 °F (36.4 °C)-98.5 °F (36.9 °C)] 97.9 °F (36.6 °C)  Pulse:  [56-72] 72  Resp:  [13-20] 17  SpO2:  [90 %-97 %] 97 %  BP: (115-163)/(57-79) 162/79     Body mass index is 25.84 kg/m².    Physical Exam   Constitutional: He appears well-developed and well-nourished.   HENT:   Head: Normocephalic and atraumatic.   Eyes: EOM are normal. Pupils are equal, round, and reactive to light. Right eye exhibits no discharge. Left eye exhibits no discharge.   Neck: Neck supple.   Pulmonary/Chest: Effort normal. No respiratory distress.   Abdominal: He exhibits no distension. There is no tenderness.   Musculoskeletal: He exhibits no edema or deformity.   Neurological:   -  Mental Status: Awake and alert. Slow to answer questions. Mild confusion present.   -  Vision: blurry vision improves with glasses  - Motor: RUE: 4/5.  LUE: 5/5.  RLE: 4/5.  LLE: 4/5.  -  Tone: normal  -  Sensory:  Intact to light touch and pin prick.   Skin: Skin is warm and dry.   Psychiatric: He has a normal mood and affect. His behavior is normal.     Diagnostic Results:   Labs: Reviewed  ECG: Reviewed  CT: Reviewed    Assessment/Plan:     * Brain metastasis    - CT head demonstrated vasogenic edema w/ 1 cm nodular lesion in the L frontal lobe.   - CXR was significant for mass like consolidations in the L lung. Complaint of chronic cough w/ occasional sputum.   - 10/25 underwent bronchoscopy, biopsy was taken from the his lung mass, bx still pending.   - Oncology following and suspecting non small cell lung carcinoma with metastases to brain, official pathology report pending.   - NSGY following and no acute neurosurgical  intervention indicated, plan for outpt radisurgery.      Impaired mobility and activities of daily living    - Related to prolonged/acute hospital course.     Recommendations  -  Encourage mobility, OOB in chair at least 3 hours per day, and early ambulation as appropriate  -  PT/OT evaluate and treat  -  Pain management  -  Monitor for and prevent skin breakdown and pressure ulcers  · Early mobility, repositioning/weight shifting every 20-30 minutes when sitting, turn patient every 2 hours, proper mattress/overlay and chair cushioning, pressure relief/heel protector boots  -  DVT prophylaxis    -  Reviewed discharge options (IP rehab, SNF, HH therapy, and OP therapy)     Non-small cell carcinoma of lung    - see brain metastasis     Testicle swelling    - US consistent with right testicle hydrocele, f/u out pt urology     Lung mass    - 10/25 underwent bronchoscopy, biopsy was taken from the his lung mass, bx still pending.   - Oncology following and suspecting non small cell lung carcinoma with metastases to brain, official pathology report pending.        Participating with therapy. Will follow progress and discuss with rehab team for post acute care/rehab recommendation.    Thank you for your consult.     Za Neves NP  Department of Physical Medicine & Rehab  [facility]

## 2018-10-29 NOTE — ASSESSMENT & PLAN NOTE
67 year old male with PMHx HTN, CAD s/p CABG, carotid artery disease s/p stenting, BPH, who presented to [facility] with AMS and found to have left frontal brain mass, likely metastasis    -Patient neurologically stable on exam  -No acute neurosurgical intervention indicated. Recommend outpaitent radiosurgery with Dr. De Jesus. NSGY will set this up.    -Continue dex for cerebral edema. Currently on 4 mg q 6 hours. Recommend 2 week taper down to 2 mg BID, if ok per primary team and HemeOnc.  -Continue Keppra 500mg for seizure prophylaxis   -Maintain SBP <160  -Pulm lesion: s/p bronch on 10/25. Path shows non-small cell carcinoma. HemeOnc on board.   -Will sign off. Please reconsult if NSGY can be of any further assistance.

## 2018-10-29 NOTE — PROGRESS NOTES
[facility] Hospital Medicine  Progress Note    Patient Name: Lorenzo Tran Sr.  MRN: [id number]  Patient Class: IP- Inpatient   Admission Date: 10/23/2018  Length of Stay: 6 days  Attending Physician: Berenice Shipman MD  Primary Care Provider: Padmini Dailey [facility] Medicine Team: [facility] MED 1 Tawnya Corbin MD    Subjective:     Principal Problem:Brain metastasis    HPI:  66 yo M w/ PMH of HTN, CAD s/p CABG, carotid artery disease s/p stenting, and elevated PSA who presents as transfer for evaluation of brain lesion. Patient was brought to [facility] ED by his family w/ concern of altered mental status and generalized weakness. Family was concerned that he was not acting like himself. Patient shares that yesterday he attempted to take a bath and was unable to lift himself out of the tub. He waited 4 hours until a family member called EMS. He endorses chronic RLE weakness secondary to work related incident approximately 30 years ago. He also endorses new RUE weakness. On evaluation in the ED, he was noted to have very mild R facial droop. CT head demonstrated vasogenic edema w/ 1 cm nodular lesion in the L frontal lobe. CXR was significant for mass like consolidations in the L lung. Complains of chronic cough w/ occasional sputum. Shares that he had 1 episode of hemoptysis 2-3 months ago. Patient has no previous lung history but endorses a long history of smoking cigars. Reports that he would smoke a pack of cigars a day. In the ED prior to transfer he received decadron 10mg. No seizure like activity noted and no previous seizure history. Reports shortness of breath and urinary symptoms. Denies fever, chills, chest pain, and abdominal pain.       Hospital Course:  10/25 underwent bronchoscopy, biopsy was taken from the his lung mass, MRI brain showed frontal mass with vasogenic edema, abdominal CT showed multiple liver mass concerning for met, neurosurgery consulted,  recommended radiotherapy as an outpatient, and there are no procedures needed to be done at this point.   10/26 No events overnight, u/s testicles showed right sided hydrocele,will follow with urology out patient, biopsy still in process, hem/onc following and developing treatment plan. Pt is for discharge, but still pending rehab placement.      Review of Systems   Constitutional: Negative for chills, diaphoresis, fatigue and fever.   HENT: Negative for sinus pressure, sinus pain and sore throat.    Eyes: Negative for pain and discharge.   Respiratory: Negative for cough, chest tightness, shortness of breath and wheezing.    Cardiovascular: Negative for chest pain, palpitations and leg swelling.   Gastrointestinal: Negative for abdominal distention, abdominal pain, blood in stool, constipation, diarrhea, nausea and vomiting.   Genitourinary: Positive for scrotal swelling. Negative for difficulty urinating, flank pain and hematuria.   Musculoskeletal: Negative for back pain (Mostly on the Rt shoulder ).        Burning pain in the Rt leg. It is a chronic issue.   Skin: Negative for color change and wound.   Neurological: Negative for dizziness, tremors, seizures, speech difficulty, numbness and headaches.   Hematological: Does not bruise/bleed easily.   Psychiatric/Behavioral: Positive for confusion. Negative for agitation.     Objective:     Vital Signs (Most Recent):  Temp: 97.9 °F (36.6 °C) (10/29/18 0723)  Pulse: 70 (10/29/18 0723)  Resp: 13 (10/29/18 0723)  BP: (!) 163/76 (10/29/18 0723)  SpO2: (!) 94 % (10/29/18 0723) Vital Signs (24h Range):  Temp:  [97.5 °F (36.4 °C)-98.7 °F (37.1 °C)] 97.9 °F (36.6 °C)  Pulse:  [52-70] 70  Resp:  [13-20] 13  SpO2:  [90 %-97 %] 94 %  BP: (112-163)/(54-76) 163/76     Weight: 74.8 kg (165 lb 0 oz)  Body mass index is 25.84 kg/m².    Intake/Output Summary (Last 24 hours) at 10/29/2018 1057  Last data filed at 10/29/2018 0900  Gross per 24 hour   Intake 1570 ml   Output 640 ml    Net 930 ml      Physical Exam   Constitutional: He is oriented to person, place, and time. He appears well-developed and well-nourished. No distress.   HENT:   Head: Normocephalic.   Cardiovascular: Normal rate, regular rhythm and normal heart sounds.   No murmur heard.  Pulmonary/Chest: Effort normal and breath sounds normal. He has no wheezes. He has no rales. He exhibits no tenderness.   Abdominal: Soft. Bowel sounds are normal. He exhibits no distension. There is no tenderness.   Musculoskeletal: Normal range of motion. He exhibits no edema, tenderness or deformity.   Neurological: He is alert and oriented to person, place, and time.   Skin: He is not diaphoretic.       Significant Labs:   BMP:   Recent Labs   Lab 10/29/18  0349   *   *   K 3.8      CO2 22*   BUN 19   CREATININE 0.8   CALCIUM 8.6*   MG 1.9     CBC:   Recent Labs   Lab 10/28/18  0417 10/29/18  0349   WBC 10.68 13.93*   HGB 12.2* 12.9*   HCT 39.5* 41.0    256     CMP:   Recent Labs   Lab 10/28/18  0417 10/29/18  0349   * 133*   K 4.0 3.8    103   CO2 25 22*   * 155*   BUN 18 19   CREATININE 0.8 0.8   CALCIUM 8.4* 8.6*   PROT 6.1 6.0   ALBUMIN 2.4* 2.4*   BILITOT 0.3 0.3   ALKPHOS 62 74   AST 11 15   ALT 23 31   ANIONGAP 6* 8   EGFRNONAA >60.0 >60.0     Assessment/Plan:      * Brain metastasis    Lung mass  Vasogenic cerebral edema  68 yo M w/ PMH of HTN, CAD s/p CABG, carotid artery disease s/p stenting, and elevated PSA who presents as transfer for evaluation of brain lesion w/ newly found mass-like consolidations on CXR. Concern for primary lung lesion w/ significant smoking history and brain metastasis w/ vasogenic edema. Mild expressive aphasia, R>L weakness, difficulty w/ mobility. Hypertensive concerning for Cushing reflex secondary to cerebral edema. HR wnl.   - IV decadron 4mg q6. Pt has mild increase in WBC mostly due to white shift from steroid usage.   - keppra 500 mg BID  - neurochecks  q4  - CT Neck/chest/abdomen/pelvis w/ contrast showed b/l lung mass, liver masses and  MRI brain showed frontal brain mass  - underwent bronch biopsy   - neurosurgery following appreciate their recs, they F/U with as an outpatient, and no procedures to be done other than XRT treatment   - hem/onc following  - follow biopsy results still pending           Hyponatremia    Levels are mildly low  - Most likely due to SIADH    Plan:  - Fluid restriction to 1 L per day       Discharge planning issues    PT/OT recommended SNF        Testicle swelling    Unsure duration  - patient reported it was noticed by his PCP 6 month ago  - denies pain, reported difficulty urination, dysuria and dripping   - right testicular swelling, soft,non tender. Soft non tender prostate on exam.  - continue Flomax and finasteride  - U/s testicles consistent with right testicles hydrocele   - will arrange urology appointment upon discharge      Lab Results   Component Value Date    PSA 2.2 10/25/2018    PSA 6.9 (H) 05/18/2017              Vasogenic cerebral edema    Check brain mets       Lung mass    - Hem/Onc suspecting non small cell lung carcinoma (NSCLC), most likely an adenocarcinoma  - Biopsy and cytology still pending      Chest pain    - Described as sharp, radiating from his R chest across to the L, non-reproducible  - Reports that this is a chronic occurrence, comes and goes   - Concern for MSK vs ACS vs PE vs likely cancerous masses on CXR  - Obtained EKG, negative for STEMI;   - resumed aspirin 81 mg  --- resolved      Essential hypertension    - stable  - resume coreg 12.5 mg BID  - possibly secondary to cushing reflex, will continue to monitor  - SBP <200    Plan:  - Pt SBP is mildly elevated we will start amlodipine 5 mg QD      MCCRAY (dyspnea on exertion)    - Chronic likely secondary to COPD given CXR findings and smoking history  - Not on O2 at home  - duonebs PRN  - On tiotropium daily       Elevated PSA    - hx of elevated  PSA  - nursing reports of urinary incontinence, dribbling, weak stream  - resume finasteride, tamsulosin       Coronary artery disease involving native heart    On coreg, Asprin and atorvastatin        Tobacco abuse    - acknowledges need to quit  - would benefit from smoking cessation counseling  - nicotine patch.          VTE Risk Mitigation (From admission, onward)        Ordered     heparin (porcine) injection 5,000 Units  Every 8 hours      10/26/18 0915     IP VTE HIGH RISK PATIENT  Once      10/24/18 0342              Tawnya Corbin MD  Department of Hospital Medicine   [facility]                    10/29/2018                             STAFF PHYSICIAN NOTE                                   Attending Attestation for Rounds with Resident  I have reviewed and concur with the resident's history, physical, assessment, and plan.  I have personally interviewed and examined the patient at bedside and agree with the resident's findings.                                  ________________________________________                                     REASON FOR ADMISSION:     Patient is 67 y.o.male    Body mass index is 25.84 kg/m².,  Brain metastasis

## 2018-10-30 LAB
ALBUMIN SERPL BCP-MCNC: 2.3 G/DL
ALP SERPL-CCNC: 61 U/L
ALT SERPL W/O P-5'-P-CCNC: 31 U/L
ANION GAP SERPL CALC-SCNC: 8 MMOL/L
AST SERPL-CCNC: 14 U/L
BASOPHILS # BLD AUTO: 0.02 K/UL
BASOPHILS NFR BLD: 0.2 %
BILIRUB SERPL-MCNC: 0.3 MG/DL
BUN SERPL-MCNC: 17 MG/DL
CALCIUM SERPL-MCNC: 8.4 MG/DL
CHLORIDE SERPL-SCNC: 102 MMOL/L
CO2 SERPL-SCNC: 22 MMOL/L
CREAT SERPL-MCNC: 0.8 MG/DL
DIFFERENTIAL METHOD: ABNORMAL
EOSINOPHIL # BLD AUTO: 0.3 K/UL
EOSINOPHIL NFR BLD: 2.3 %
ERYTHROCYTE [DISTWIDTH] IN BLOOD BY AUTOMATED COUNT: 14.4 %
EST. GFR  (AFRICAN AMERICAN): >60 ML/MIN/1.73 M^2
EST. GFR  (NON AFRICAN AMERICAN): >60 ML/MIN/1.73 M^2
GLUCOSE SERPL-MCNC: 126 MG/DL
HCT VFR BLD AUTO: 38.9 %
HGB BLD-MCNC: 12.4 G/DL
IMM GRANULOCYTES # BLD AUTO: 0.21 K/UL
IMM GRANULOCYTES NFR BLD AUTO: 1.6 %
LYMPHOCYTES # BLD AUTO: 1.5 K/UL
LYMPHOCYTES NFR BLD: 11.6 %
MAGNESIUM SERPL-MCNC: 2.1 MG/DL
MCH RBC QN AUTO: 26.9 PG
MCHC RBC AUTO-ENTMCNC: 31.9 G/DL
MCV RBC AUTO: 84 FL
MONOCYTES # BLD AUTO: 0.9 K/UL
MONOCYTES NFR BLD: 6.5 %
NEUTROPHILS # BLD AUTO: 10.4 K/UL
NEUTROPHILS NFR BLD: 77.8 %
NRBC BLD-RTO: 0 /100 WBC
PHOSPHATE SERPL-MCNC: 2.8 MG/DL
PLATELET # BLD AUTO: 236 K/UL
PMV BLD AUTO: 10.3 FL
POCT GLUCOSE: 107 MG/DL (ref 70–110)
POCT GLUCOSE: 120 MG/DL (ref 70–110)
POCT GLUCOSE: 123 MG/DL (ref 70–110)
POCT GLUCOSE: 137 MG/DL (ref 70–110)
POTASSIUM SERPL-SCNC: 3.9 MMOL/L
PROT SERPL-MCNC: 5.8 G/DL
RBC # BLD AUTO: 4.61 M/UL
SODIUM SERPL-SCNC: 132 MMOL/L
WBC # BLD AUTO: 13.28 K/UL

## 2018-10-30 PROCEDURE — 84100 ASSAY OF PHOSPHORUS: CPT

## 2018-10-30 PROCEDURE — 11000001 HC ACUTE MED/SURG PRIVATE ROOM

## 2018-10-30 PROCEDURE — 25000003 PHARM REV CODE 250: Performed by: STUDENT IN AN ORGANIZED HEALTH CARE EDUCATION/TRAINING PROGRAM

## 2018-10-30 PROCEDURE — 63600175 PHARM REV CODE 636 W HCPCS: Performed by: STUDENT IN AN ORGANIZED HEALTH CARE EDUCATION/TRAINING PROGRAM

## 2018-10-30 PROCEDURE — 99232 SBSQ HOSP IP/OBS MODERATE 35: CPT | Mod: ,,, | Performed by: NURSE PRACTITIONER

## 2018-10-30 PROCEDURE — 99231 SBSQ HOSP IP/OBS SF/LOW 25: CPT | Mod: ,,, | Performed by: HOSPITALIST

## 2018-10-30 PROCEDURE — 80053 COMPREHEN METABOLIC PANEL: CPT

## 2018-10-30 PROCEDURE — 85025 COMPLETE CBC W/AUTO DIFF WBC: CPT

## 2018-10-30 PROCEDURE — 83735 ASSAY OF MAGNESIUM: CPT

## 2018-10-30 PROCEDURE — 97530 THERAPEUTIC ACTIVITIES: CPT

## 2018-10-30 PROCEDURE — 25000242 PHARM REV CODE 250 ALT 637 W/ HCPCS: Performed by: STUDENT IN AN ORGANIZED HEALTH CARE EDUCATION/TRAINING PROGRAM

## 2018-10-30 PROCEDURE — S4991 NICOTINE PATCH NONLEGEND: HCPCS | Performed by: STUDENT IN AN ORGANIZED HEALTH CARE EDUCATION/TRAINING PROGRAM

## 2018-10-30 PROCEDURE — 36415 COLL VENOUS BLD VENIPUNCTURE: CPT

## 2018-10-30 RX ORDER — DEXAMETHASONE 4 MG/1
4 TABLET ORAL EVERY 8 HOURS
Status: COMPLETED | OUTPATIENT
Start: 2018-10-30 | End: 2018-11-03

## 2018-10-30 RX ORDER — DEXAMETHASONE 1 MG/1
2 TABLET ORAL EVERY 12 HOURS
Status: DISCONTINUED | OUTPATIENT
Start: 2018-11-11 | End: 2018-11-04 | Stop reason: HOSPADM

## 2018-10-30 RX ORDER — DEXAMETHASONE 1 MG/1
2 TABLET ORAL EVERY 6 HOURS
Status: DISCONTINUED | OUTPATIENT
Start: 2018-11-03 | End: 2018-11-04 | Stop reason: HOSPADM

## 2018-10-30 RX ORDER — DEXAMETHASONE 1 MG/1
2 TABLET ORAL EVERY 8 HOURS
Status: DISCONTINUED | OUTPATIENT
Start: 2018-11-07 | End: 2018-11-04 | Stop reason: HOSPADM

## 2018-10-30 RX ADMIN — TAMSULOSIN HYDROCHLORIDE 0.4 MG: 0.4 CAPSULE ORAL at 08:10

## 2018-10-30 RX ADMIN — LEVETIRACETAM 500 MG: 500 TABLET ORAL at 08:10

## 2018-10-30 RX ADMIN — ASPIRIN 81 MG: 81 TABLET, COATED ORAL at 08:10

## 2018-10-30 RX ADMIN — FINASTERIDE 5 MG: 5 TABLET, FILM COATED ORAL at 08:10

## 2018-10-30 RX ADMIN — GABAPENTIN 300 MG: 300 CAPSULE ORAL at 08:10

## 2018-10-30 RX ADMIN — HYDROCODONE BITARTRATE AND ACETAMINOPHEN 1 TABLET: 5; 325 TABLET ORAL at 06:10

## 2018-10-30 RX ADMIN — LEVETIRACETAM 500 MG: 500 TABLET ORAL at 09:10

## 2018-10-30 RX ADMIN — GABAPENTIN 300 MG: 300 CAPSULE ORAL at 03:10

## 2018-10-30 RX ADMIN — DEXAMETHASONE 4 MG: 1 TABLET ORAL at 05:10

## 2018-10-30 RX ADMIN — RAMELTEON 8 MG: 8 TABLET, FILM COATED ORAL at 09:10

## 2018-10-30 RX ADMIN — ATORVASTATIN CALCIUM 80 MG: 20 TABLET, FILM COATED ORAL at 08:10

## 2018-10-30 RX ADMIN — AMLODIPINE BESYLATE 5 MG: 5 TABLET ORAL at 08:10

## 2018-10-30 RX ADMIN — DEXAMETHASONE 4 MG: 4 TABLET ORAL at 03:10

## 2018-10-30 RX ADMIN — DEXAMETHASONE 4 MG: 4 TABLET ORAL at 09:10

## 2018-10-30 RX ADMIN — NICOTINE 1 PATCH: 14 PATCH, EXTENDED RELEASE TRANSDERMAL at 08:10

## 2018-10-30 RX ADMIN — PANTOPRAZOLE SODIUM 40 MG: 40 TABLET, DELAYED RELEASE ORAL at 08:10

## 2018-10-30 RX ADMIN — GABAPENTIN 300 MG: 300 CAPSULE ORAL at 09:10

## 2018-10-30 RX ADMIN — HEPARIN SODIUM 5000 UNITS: 5000 INJECTION, SOLUTION INTRAVENOUS; SUBCUTANEOUS at 09:10

## 2018-10-30 RX ADMIN — HEPARIN SODIUM 5000 UNITS: 5000 INJECTION, SOLUTION INTRAVENOUS; SUBCUTANEOUS at 03:10

## 2018-10-30 RX ADMIN — TIOTROPIUM BROMIDE 18 MCG: 18 CAPSULE ORAL; RESPIRATORY (INHALATION) at 08:10

## 2018-10-30 RX ADMIN — HEPARIN SODIUM 5000 UNITS: 5000 INJECTION, SOLUTION INTRAVENOUS; SUBCUTANEOUS at 05:10

## 2018-10-30 NOTE — ASSESSMENT & PLAN NOTE
- Biopsy showed non-small cell lung ca, and cytology still pending     Plan:  - Rehab placement is still pending.  - Pt will FU with Hem/Onc @ [facility]. Next appointment is next Tuesday 11/6/2018.

## 2018-10-30 NOTE — ASSESSMENT & PLAN NOTE
- Described as sharp, radiating from his R chest across to the L, non-reproducible  - Reports that this is a chronic occurrence, comes and goes   - Concern for MSK vs ACS vs PE vs likely cancerous masses on CXR  - Obtained EKG, negative for STEMI;   - resumed aspirin 81 mg  --- resolved, Pt has PRN pain meds

## 2018-10-30 NOTE — ASSESSMENT & PLAN NOTE
Discharge Recommendations:  rehabilitation facility   Discharge Equipment Recommendations: walker, rolling     Pending rehab placement

## 2018-10-30 NOTE — CLINICAL REVIEW
Reviewed results with patient. Told him more tests are pending to determine optimal treatment for his non-small cell lung cancer.  Appointments arranged for Dr. Romero 11/6/18. Communicated with primary team that transportation from his rehab to his oncology appointment will be needed.  For his brain metastases, he will be getting outpatient neurosurgery with Dr. De Jesus.    Dereck Thomas MD  Hematology Oncology Fellow PGY5

## 2018-10-30 NOTE — SUBJECTIVE & OBJECTIVE
Interval History 10/30/2018:  Patient is seen for follow-up rehab evaluation and recommendations: No acute events over night. Participating with therapy.     HPI, Past Medical, Family, and Social History remains the same as documented in the initial encounter.    Scheduled Medications:    amLODIPine  5 mg Oral Daily    aspirin  81 mg Oral Daily    atorvastatin  80 mg Oral Daily    dexamethasone  4 mg Oral Q8H    Followed by    [START ON 11/3/2018] dexamethasone  2 mg Oral Q6H    Followed by    [START ON 11/7/2018] dexamethasone  2 mg Oral Q8H    Followed by    [START ON 11/11/2018] dexamethasone  2 mg Oral Q12H    finasteride  5 mg Oral Daily    gabapentin  300 mg Oral TID    heparin (porcine)  5,000 Units Subcutaneous Q8H    levETIRAcetam  500 mg Oral BID    nicotine  1 patch Transdermal Daily    pantoprazole  40 mg Oral Daily    tamsulosin  0.4 mg Oral Daily    tiotropium  1 capsule Inhalation Daily     Diagnostic Results: Labs: Reviewed    PRN Medications: dextrose 50%, dextrose 50%, glucagon (human recombinant), glucose, glucose, hydrALAZINE, HYDROcodone-acetaminophen, insulin aspart U-100, ramelteon, sodium chloride 0.9%    Review of Systems   Constitutional: Negative for fatigue and fever.   HENT: Negative for trouble swallowing and voice change.    Respiratory: Positive for cough. Negative for shortness of breath.    Cardiovascular: Negative for chest pain and leg swelling.   Gastrointestinal: Negative for abdominal distention and abdominal pain.   Genitourinary: Negative for difficulty urinating and flank pain.   Musculoskeletal: Positive for gait problem. Negative for back pain.   Skin: Negative for color change and rash.   Neurological: Positive for weakness and numbness (RLE and R hip). Negative for speech difficulty.   Psychiatric/Behavioral: Positive for confusion. Negative for agitation.     Objective:     Vital Signs (Most Recent):  Temp: 98.4 °F (36.9 °C) (10/30/18 1136)  Pulse: 63  (10/30/18 1136)  Resp: 20 (10/30/18 1136)  BP: 135/68 (10/30/18 1136)  SpO2: 96 % (10/30/18 1136)    Vital Signs (24h Range):  Temp:  [97 °F (36.1 °C)-98.6 °F (37 °C)] 98.4 °F (36.9 °C)  Pulse:  [46-77] 63  Resp:  [16-20] 20  SpO2:  [93 %-96 %] 96 %  BP: (107-158)/(55-78) 135/68     Physical Exam   Constitutional: He appears well-developed and well-nourished.   HENT:   Head: Normocephalic and atraumatic.   Eyes: EOM are normal. Pupils are equal, round, and reactive to light. Right eye exhibits no discharge. Left eye exhibits no discharge.   Neck: Neck supple.   Pulmonary/Chest: Effort normal. No respiratory distress.   Abdominal: He exhibits no distension. There is no tenderness.   Musculoskeletal: He exhibits no edema or deformity.   Neurological:   -  Mental Status: Awake and alert. Slow to answer questions. Mild confusion present.   -  Vision: blurry vision improves with glasses  - Motor: RUE: 4/5.  LUE: 5/5.  RLE: 4/5.  LLE: 4/5.  -  Tone: normal  -  Sensory:  Intact to light touch and pin prick.   Skin: Skin is warm and dry.   Psychiatric: He has a normal mood and affect. His behavior is normal.     NEUROLOGICAL EXAMINATION:     CRANIAL NERVES     CN III, IV, VI   Pupils are equal, round, and reactive to light.  Extraocular motions are normal.

## 2018-10-30 NOTE — PLAN OF CARE
Problem: Patient Care Overview  Goal: Plan of Care Review  Outcome: Ongoing (interventions implemented as appropriate)  Pt remains free of falls and injury. Pt makes statement of no pain. Incont of urine twice. BS with 116 result. Bed low and locked, Call light within reach.

## 2018-10-30 NOTE — PT/OT/SLP PROGRESS
Physical Therapy Treatment    Patient Name:  Lorenzo Tran Sr.   MRN:  [id number]    Recommendations:     Discharge Recommendations:  rehabilitation facility   Discharge Equipment Recommendations: walker, rolling   Barriers to discharge: Inaccessible home and Decreased caregiver support    Assessment:     Lorenzo Tran Sr. is a 67 y.o. male admitted with a medical diagnosis of Brain metastasis.  He presents with the following impairments/functional limitations:  weakness, impaired endurance, impaired self care skills, gait instability, impaired functional mobilty, decreased lower extremity function, pain. Pt continues to exhibit SOB and dizziness c/upright posture and activity. He expresses feeling bilateral LE weakness but exhibits ability to ambulate very short distances. He participates in therapy without encouragement and demonstrates high motivation to improve his functional mobility. He would benefit from skilled interdisciplinary therapy in an inpatient environment to assist in his return to [facility].    Rehab Prognosis:  good; patient would benefit from acute skilled PT services to address these deficits and reach maximum level of function.      Recent Surgery: * No surgery found *      Plan:     During this hospitalization, patient to be seen 3 x/week to address the above listed problems via gait training, therapeutic activities, therapeutic exercises, neuromuscular re-education  · Plan of Care Expires:  11/25/18   Plan of Care Reviewed with: patient    Subjective     Communicated with nsg prior to session.  Patient found supine upon PT entry to room, agreeable to treatment.      Chief Complaint: headache  Patient comments/goals: none stated  Pain/Comfort:  · Pain Rating 1: other (see comments)(NPRS not obtained)  · Location - Orientation 1: generalized  · Location 1: head  · Pain Addressed 1: Distraction    Patients cultural, spiritual, Congregation conflicts given the current situation: none  stated    Objective:     Patient found with: peripheral IV     General Precautions: Standard, fall   Orthopedic Precautions:N/A   Braces: N/A     Functional Mobility:  · Bed Mobility:     · Bridging: minimum assistance  · Supine to Sit: minimum assistance  · Sit to Supine: minimum assistance  · Transfers:     · Sit to Stand:  minimum assistance with no AD  · Gait: Pt ambulated 10' in room c/RW and CGA x2 trials. He exhibited small shuffling steps, decreased speed, and kyphotic posture. He c/o fatigue and SOB following activity    Balance:  · Static standing balance: 3 minutes for perianal cleaning c/RW and SBA and increased reliance on UE for balance and weight support.      AM-PAC 6 CLICK MOBILITY  Turning over in bed (including adjusting bedclothes, sheets and blankets)?: 3  Sitting down on and standing up from a chair with arms (e.g., wheelchair, bedside commode, etc.): 3  Moving from lying on back to sitting on the side of the bed?: 2  Moving to and from a bed to a chair (including a wheelchair)?: 3  Need to walk in hospital room?: 3  Climbing 3-5 steps with a railing?: 2  Basic Mobility Total Score: 16       Therapeutic Activities and Exercises:   PT completed cleaning pt following bowel movement. Pt c/o SOB and dizziness while on commode and required extended rest break before returning to bed. Pt required min A to bridge high enough in order to correctly place diaper 2/2 pt requesting to return to bed before completion of activity.    Pt safe for edge of bed and standing activity with RW and 1 person assist    Patient left supine with all lines intact and call button in reach..    GOALS:   Multidisciplinary Problems     Physical Therapy Goals        Problem: Physical Therapy Goal    Goal Priority Disciplines Outcome Goal Variances Interventions   Physical Therapy Goal     PT, PT/OT Ongoing (interventions implemented as appropriate)     Description:  Goals to be met by: 11/5/18     Patient will increase  functional independence with mobility by performin. Sit to stand transfer with Standby Assistance  2. Gait  x 50 feet with Contact Guard Assistance using Rolling Walker.   3. Ascend/descend 6 stair with bilateral Handrails Minimal Assistance using no AD.   4. Lower extremity exercise program x15 reps per handout, with supervision                       Time Tracking:     PT Received On: 10/30/18  PT Start Time: 1218     PT Stop Time: 1243  PT Total Time (min): 25 min     Billable Minutes: Therapeutic Activity 25min    Treatment Type: Treatment  PT/PTA: PT           Dariana Mijares, SPT  10/30/2018

## 2018-10-30 NOTE — ASSESSMENT & PLAN NOTE
Lung mass  Vasogenic cerebral edema  68 yo M w/ PMH of HTN, CAD s/p CABG, carotid artery disease s/p stenting, and elevated PSA who presents as transfer for evaluation of brain lesion w/ newly found mass-like consolidations on CXR. Concern for primary lung lesion w/ significant smoking history and brain metastasis w/ vasogenic edema. Mild expressive aphasia, R>L weakness, difficulty w/ mobility. Hypertensive concerning for Cushing reflex secondary to cerebral edema. HR wnl.   - IV decadron 4mg q6. Pt has mild increase in WBC mostly due to white shift from steroid usage.   - keppra 500 mg BID  - CT Neck/chest/abdomen/pelvis w/ contrast showed b/l lung mass, liver masses and  MRI brain showed frontal brain mass  - underwent bronch biopsy showed non-small cell lung ca  - neurosurgery following appreciate their recs, they F/U with as an outpatient, and no procedures to be done other than XRT treatment   - hem/onc updated us today that the Pt will FU with Hem/Onc @ hospitals. Next appointment is next Tuesday 11/6/2018.    Neurosurgery latest Recs:  - Continue dex for cerebral edema. Currently on 4 mg q 6 hours. Recommend 2 week taper down to 2 mg BID, if ok per primary team and HemeOnc.  - Continue Keppra 500mg for seizure prophylaxis   - Maintain SBP <160

## 2018-10-30 NOTE — ASSESSMENT & PLAN NOTE
- stable  - resume coreg 12.5 mg BID  - possibly secondary to cushing reflex, will continue to monitor  - In hospital amlodipine 5 mg was added

## 2018-10-30 NOTE — PLAN OF CARE
Problem: Physical Therapy Goal  Goal: Physical Therapy Goal  Goals to be met by: 18     Patient will increase functional independence with mobility by performin. Sit to stand transfer with Standby Assistance  2. Gait  x 50 feet with Contact Guard Assistance using Rolling Walker.   3. Ascend/descend 6 stair with bilateral Handrails Minimal Assistance using no AD.   4. Lower extremity exercise program x15 reps per handout, with supervision      Outcome: Ongoing (interventions implemented as appropriate)  POC remains appropriate    Dariana Mijares, SPT  10/30/18

## 2018-10-30 NOTE — PROGRESS NOTES
[facility]  Physical Medicine & Rehab  Progress Note    Patient Name: Lorenzo Tran Sr.  MRN: [id number]  Admission Date: 10/23/2018  Length of Stay: 7 days  Attending Physician: Vickie Anderson MD    Subjective:     Principal Problem: Brain metastasis    Hospital Course:   10/26/2018: Evaluated by OT.  Bed mobility Jeffrey.  Transfers Jeffrey Adl's Jeffrey.   10/29/2018: Participated with OT.  Bed mobility Jeffrey.  Sit to stand Jeffrey w/ RW. Transfers Jeffrey w/ RW. Grooming CGA. UBD Jeffrey. LBD totalA. Toileting Jeffrey.     Interval History 10/30/2018:  Patient is seen for follow-up rehab evaluation and recommendations: No acute events over night. Participating with therapy.     HPI, Past Medical, Family, and Social History remains the same as documented in the initial encounter.    Scheduled Medications:    amLODIPine  5 mg Oral Daily    aspirin  81 mg Oral Daily    atorvastatin  80 mg Oral Daily    dexamethasone  4 mg Oral Q8H    Followed by    [START ON 11/3/2018] dexamethasone  2 mg Oral Q6H    Followed by    [START ON 11/7/2018] dexamethasone  2 mg Oral Q8H    Followed by    [START ON 11/11/2018] dexamethasone  2 mg Oral Q12H    finasteride  5 mg Oral Daily    gabapentin  300 mg Oral TID    heparin (porcine)  5,000 Units Subcutaneous Q8H    levETIRAcetam  500 mg Oral BID    nicotine  1 patch Transdermal Daily    pantoprazole  40 mg Oral Daily    tamsulosin  0.4 mg Oral Daily    tiotropium  1 capsule Inhalation Daily     Diagnostic Results: Labs: Reviewed    PRN Medications: dextrose 50%, dextrose 50%, glucagon (human recombinant), glucose, glucose, hydrALAZINE, HYDROcodone-acetaminophen, insulin aspart U-100, ramelteon, sodium chloride 0.9%    Review of Systems   Constitutional: Negative for fatigue and fever.   HENT: Negative for trouble swallowing and voice change.    Respiratory: Positive for cough. Negative for shortness of breath.    Cardiovascular: Negative for chest pain and leg  swelling.   Gastrointestinal: Negative for abdominal distention and abdominal pain.   Genitourinary: Negative for difficulty urinating and flank pain.   Musculoskeletal: Positive for gait problem. Negative for back pain.   Skin: Negative for color change and rash.   Neurological: Positive for weakness and numbness (RLE and R hip).   Psychiatric/Behavioral: Positive for confusion. Negative for agitation.     Objective:     Vital Signs (Most Recent):  Temp: 98.4 °F (36.9 °C) (10/30/18 1136)  Pulse: 63 (10/30/18 1136)  Resp: 20 (10/30/18 1136)  BP: 135/68 (10/30/18 1136)  SpO2: 96 % (10/30/18 1136)    Vital Signs (24h Range):  Temp:  [97 °F (36.1 °C)-98.6 °F (37 °C)] 98.4 °F (36.9 °C)  Pulse:  [46-77] 63  Resp:  [16-20] 20  SpO2:  [93 %-96 %] 96 %  BP: (107-158)/(55-78) 135/68     Physical Exam   Constitutional: He appears well-developed and well-nourished.   HENT:   Head: Normocephalic and atraumatic.   Eyes: EOM are normal. Pupils are equal, round, and reactive to light. Right eye exhibits no discharge. Left eye exhibits no discharge.   Neck: Neck supple.   Pulmonary/Chest: Effort normal. No respiratory distress.   Abdominal: He exhibits no distension. There is no tenderness.   Musculoskeletal: He exhibits no edema or deformity.   Neurological:   -  Mental Status: Awake and alert. Slow to answer questions. Mild confusion present.   -  Vision: blurry vision improves with glasses  - Motor: RUE: 4/5.  LUE: 5/5.  RLE: 4/5.  LLE: 4/5.  -  Tone: normal  -  Sensory:  Intact to light touch and pin prick.   Skin: Skin is warm and dry.   Psychiatric: He has a normal mood and affect. His behavior is normal.     Assessment/Plan:      * Brain metastasis    - CT head demonstrated vasogenic edema w/ 1 cm nodular lesion in the L frontal lobe.   - CXR was significant for mass like consolidations in the L lung. Complaint of chronic cough w/ occasional sputum.   - 10/25 underwent bronchoscopy, biopsy was taken from the his lung mass,  bx still pending.   - Oncology following and suspecting non small cell lung carcinoma with metastases to brain, official pathology report pending.   - NSGY following and no acute neurosurgical intervention indicated, plan for outpt radisurgery.      Impaired mobility and activities of daily living    - Related to prolonged/acute hospital course.     Recommendations  -  Encourage mobility, OOB in chair at least 3 hours per day, and early ambulation as appropriate  -  PT/OT evaluate and treat  -  Pain management  -  Monitor for and prevent skin breakdown and pressure ulcers  · Early mobility, repositioning/weight shifting every 20-30 minutes when sitting, turn patient every 2 hours, proper mattress/overlay and chair cushioning, pressure relief/heel protector boots  -  DVT prophylaxis    -  Reviewed discharge options (IP rehab, SNF, HH therapy, and OP therapy)     Non-small cell carcinoma of lung    - see brain metastasis     Testicle swelling    - US consistent with right testicle hydrocele, f/u out pt urology     Lung mass    - 10/25 underwent bronchoscopy, biopsy was taken from the his lung mass, bx still pending.   - Oncology following and suspecting non small cell lung carcinoma with metastases to brain, official pathology report pending.        Recommend Inpatient Rehab.  IRF preference per patient/[facility].  Barriers to IRF admission:  Medical stability.     Za Neves NP  Department of Physical Medicine & Rehab   [facility]

## 2018-10-30 NOTE — PROGRESS NOTES
[facility] Hospital Medicine  Progress Note    Patient Name: Lorenzo Tran Sr.  MRN: [id number]  Patient Class: IP- Inpatient   Admission Date: 10/23/2018  Length of Stay: 7 days  Attending Physician: Vickie Anderson MD  Primary Care Provider: Padmini Dailey [facility] Medicine Team: [facility] 1 Tawnya Corbin MD    Subjective:     Principal Problem:Brain metastasis    HPI:  68 yo M w/ PMH of HTN, CAD s/p CABG, carotid artery disease s/p stenting, and elevated PSA who presents as transfer for evaluation of brain lesion. Patient was brought to [facility] ED by his family w/ concern of altered mental status and generalized weakness. Family was concerned that he was not acting like himself. Patient shares that yesterday he attempted to take a bath and was unable to lift himself out of the tub. He waited 4 hours until a family member called EMS. He endorses chronic RLE weakness secondary to work related incident approximately 30 years ago. He also endorses new RUE weakness. On evaluation in the ED, he was noted to have very mild R facial droop. CT head demonstrated vasogenic edema w/ 1 cm nodular lesion in the L frontal lobe. CXR was significant for mass like consolidations in the L lung. Complains of chronic cough w/ occasional sputum. Shares that he had 1 episode of hemoptysis 2-3 months ago. Patient has no previous lung history but endorses a long history of smoking cigars. Reports that he would smoke a pack of cigars a day. In the ED prior to transfer he received decadron 10mg. No seizure like activity noted and no previous seizure history. Reports shortness of breath and urinary symptoms. Denies fever, chills, chest pain, and abdominal pain.       Hospital Course:  10/25 underwent bronchoscopy, biopsy was taken from the his lung mass, MRI brain showed frontal mass with vasogenic edema, abdominal CT showed multiple liver mass concerning for met, neurosurgery consulted will  involve hem/och tomorrow.   10/26 U/S testicles showed right sided hydrocele,will follow with urology out patient, biopsy still in process, no acute interventions by neurosurgery. hem/onc following and developing treatment plan.  10/27-28-29 Pt is stable, except his BP in which we added amlodipine for a better control.  10/30 Plan for rehab placement is pending. Pt will F/U with hem/onc @ [facility]. In regards to radiation therapy for his brain lesion, Neurosurgery recommended to perform the procedure in [facility] as an outpatient.    Review of Systems   Constitutional: Negative for chills, diaphoresis, fatigue and fever.   Respiratory: Negative for cough and shortness of breath.    Cardiovascular: Negative for chest pain, palpitations and leg swelling.   Gastrointestinal: Negative for abdominal pain, blood in stool, diarrhea, nausea and vomiting.   Genitourinary: Positive for scrotal swelling. Negative for decreased urine volume, flank pain and hematuria.   Skin: Negative for rash and wound.   Neurological: Positive for weakness and headaches. Negative for tremors, seizures and facial asymmetry.   Psychiatric/Behavioral: Negative for agitation and confusion.     Objective:     Vital Signs (Most Recent):  Temp: 98.4 °F (36.9 °C) (10/30/18 1136)  Pulse: 63 (10/30/18 1136)  Resp: 20 (10/30/18 1136)  BP: 135/68 (10/30/18 1136)  SpO2: 96 % (10/30/18 1136) Vital Signs (24h Range):  Temp:  [97 °F (36.1 °C)-98.6 °F (37 °C)] 98.4 °F (36.9 °C)  Pulse:  [46-77] 63  Resp:  [16-20] 20  SpO2:  [93 %-96 %] 96 %  BP: (107-158)/(55-78) 135/68     Weight: 74.8 kg (165 lb 0 oz)  Body mass index is 25.84 kg/m².    Intake/Output Summary (Last 24 hours) at 10/30/2018 1539  Last data filed at 10/30/2018 0500  Gross per 24 hour   Intake 240 ml   Output 0 ml   Net 240 ml      Physical Exam   Constitutional: He is oriented to person, place, and time. He appears well-developed and well-nourished.   HENT:   Head: Normocephalic.   Cardiovascular: Normal  rate, regular rhythm and normal heart sounds.   No murmur heard.  Pulmonary/Chest: Effort normal. No respiratory distress. He has no rales. He exhibits no tenderness.   Lt lung mildly decrease in breath sounds    Abdominal: Soft. Bowel sounds are normal. He exhibits no distension. There is no tenderness.   Musculoskeletal: He exhibits no edema, tenderness or deformity.   Neurological: He is alert and oriented to person, place, and time.       Significant Labs:   BMP:   Recent Labs   Lab 10/30/18  0406   *   *   K 3.9      CO2 22*   BUN 17   CREATININE 0.8   CALCIUM 8.4*   MG 2.1     CBC:   Recent Labs   Lab 10/29/18  0349 10/30/18  0406   WBC 13.93* 13.28*   HGB 12.9* 12.4*   HCT 41.0 38.9*    236     CMP:   Recent Labs   Lab 10/29/18  0349 10/30/18  0406   * 132*   K 3.8 3.9    102   CO2 22* 22*   * 126*   BUN 19 17   CREATININE 0.8 0.8   CALCIUM 8.6* 8.4*   PROT 6.0 5.8*   ALBUMIN 2.4* 2.3*   BILITOT 0.3 0.3   ALKPHOS 74 61   AST 15 14   ALT 31 31   ANIONGAP 8 8   EGFRNONAA >60.0 >60.0     Assessment/Plan:      * Brain metastasis    Lung mass  Vasogenic cerebral edema  68 yo M w/ PMH of HTN, CAD s/p CABG, carotid artery disease s/p stenting, and elevated PSA who presents as transfer for evaluation of brain lesion w/ newly found mass-like consolidations on CXR. Concern for primary lung lesion w/ significant smoking history and brain metastasis w/ vasogenic edema. Mild expressive aphasia, R>L weakness, difficulty w/ mobility. Hypertensive concerning for Cushing reflex secondary to cerebral edema. HR wnl.   - IV decadron 4mg q6. Pt has mild increase in WBC mostly due to white shift from steroid usage.   - keppra 500 mg BID  - CT Neck/chest/abdomen/pelvis w/ contrast showed b/l lung mass, liver masses and  MRI brain showed frontal brain mass  - underwent bronch biopsy showed non-small cell lung ca  - neurosurgery following appreciate their recs, they F/U with as an  outpatient, and no procedures to be done other than XRT treatment. It will be done here in [facility].   - hem/onc updated us today that the Pt will FU with Hem/Onc @ [facility]. Next appointment is next Tuesday 11/6/2018.    Neurosurgery latest Recs:  - Continue dex for cerebral edema. Currently on 4 mg q 6 hours. Recommend 2 week taper down to 2 mg BID  - Continue Keppra 500mg for seizure prophylaxis   - Maintain SBP <160     Hyponatremia    Levels are mildly low  - Most likely due to SIADH       Discharge planning issues    Discharge Recommendations:  rehabilitation facility   Discharge Equipment Recommendations: walker, rolling     Pending rehab placement      Testicle swelling    Unsure duration  - patient reported it was noticed by his PCP 6 month ago  - denies pain, reported difficulty urination, dysuria and dripping   - right testicular swelling, soft,non tender. Soft non tender prostate on exam.  - continue Flomax and finasteride  - U/s testicles consistent with right testicles hydrocele   - will arrange urology appointment upon discharge      Lab Results   Component Value Date    PSA 2.2 10/25/2018    PSA 6.9 (H) 05/18/2017          Vasogenic cerebral edema    Check brain mets       Lung mass    - Biopsy showed non-small cell lung ca, and cytology still pending     Plan:  - Rehab placement is still pending.  - Pt will FU with Hem/Onc @ [facility]. Next appointment is next Tuesday 11/6/2018.     Chest pain    - Described as sharp, radiating from his R chest across to the L, non-reproducible  - Reports that this is a chronic occurrence, comes and goes   - Concern for MSK vs ACS vs PE vs likely cancerous masses on CXR  - Obtained EKG, negative for STEMI;   - resumed aspirin 81 mg  --- resolved, Pt has PRN pain meds      Essential hypertension    - stable  - resume coreg 12.5 mg BID  - possibly secondary to cushing reflex, will continue to monitor  - In hospital amlodipine 5 mg was added      MCCRAY (dyspnea on exertion)    -  Chronic likely secondary to COPD given CXR findings and smoking history  - Not on O2 at home  - duonebs PRN  - On tiotropium daily       Elevated PSA    - hx of elevated PSA  - nursing reports of urinary incontinence, dribbling, weak stream  - resume finasteride, tamsulosin       Coronary artery disease involving native heart    On coreg, Asprin and atorvastatin        Tobacco abuse    - acknowledges need to quit  - would benefit from smoking cessation counseling  - nicotine patch.          VTE Risk Mitigation (From admission, onward)        Ordered     heparin (porcine) injection 5,000 Units  Every 8 hours      10/26/18 0915     IP VTE HIGH RISK PATIENT  Once      10/24/18 5412              Tawnya Corbin MD  Department of Hospital Medicine   [facility]

## 2018-10-30 NOTE — SUBJECTIVE & OBJECTIVE
Review of Systems   Constitutional: Negative for chills, diaphoresis, fatigue and fever.   Respiratory: Negative for cough and shortness of breath.    Cardiovascular: Negative for chest pain, palpitations and leg swelling.   Gastrointestinal: Negative for abdominal pain, blood in stool, diarrhea, nausea and vomiting.   Genitourinary: Positive for scrotal swelling. Negative for decreased urine volume, flank pain and hematuria.   Skin: Negative for rash and wound.   Neurological: Positive for weakness and headaches. Negative for tremors, seizures and facial asymmetry.   Psychiatric/Behavioral: Negative for agitation and confusion.     Objective:     Vital Signs (Most Recent):  Temp: 98.4 °F (36.9 °C) (10/30/18 1136)  Pulse: 63 (10/30/18 1136)  Resp: 20 (10/30/18 1136)  BP: 135/68 (10/30/18 1136)  SpO2: 96 % (10/30/18 1136) Vital Signs (24h Range):  Temp:  [97 °F (36.1 °C)-98.6 °F (37 °C)] 98.4 °F (36.9 °C)  Pulse:  [46-77] 63  Resp:  [16-20] 20  SpO2:  [93 %-96 %] 96 %  BP: (107-158)/(55-78) 135/68     Weight: 74.8 kg (165 lb 0 oz)  Body mass index is 25.84 kg/m².    Intake/Output Summary (Last 24 hours) at 10/30/2018 1539  Last data filed at 10/30/2018 0500  Gross per 24 hour   Intake 240 ml   Output 0 ml   Net 240 ml      Physical Exam   Constitutional: He is oriented to person, place, and time. He appears well-developed and well-nourished.   HENT:   Head: Normocephalic.   Cardiovascular: Normal rate, regular rhythm and normal heart sounds.   No murmur heard.  Pulmonary/Chest: Effort normal. No respiratory distress. He has no rales. He exhibits no tenderness.   Lt lung mildly decrease in breath sounds    Abdominal: Soft. Bowel sounds are normal. He exhibits no distension. There is no tenderness.   Musculoskeletal: He exhibits no edema, tenderness or deformity.   Neurological: He is alert and oriented to person, place, and time.       Significant Labs:   BMP:   Recent Labs   Lab 10/30/18  0406   *   *    K 3.9      CO2 22*   BUN 17   CREATININE 0.8   CALCIUM 8.4*   MG 2.1     CBC:   Recent Labs   Lab 10/29/18  0349 10/30/18  0406   WBC 13.93* 13.28*   HGB 12.9* 12.4*   HCT 41.0 38.9*    236     CMP:   Recent Labs   Lab 10/29/18  0349 10/30/18  0406   * 132*   K 3.8 3.9    102   CO2 22* 22*   * 126*   BUN 19 17   CREATININE 0.8 0.8   CALCIUM 8.6* 8.4*   PROT 6.0 5.8*   ALBUMIN 2.4* 2.3*   BILITOT 0.3 0.3   ALKPHOS 74 61   AST 15 14   ALT 31 31   ANIONGAP 8 8   EGFRNONAA >60.0 >60.0

## 2018-10-31 LAB
POCT GLUCOSE: 122 MG/DL (ref 70–110)
POCT GLUCOSE: 126 MG/DL (ref 70–110)
POCT GLUCOSE: 161 MG/DL (ref 70–110)

## 2018-10-31 PROCEDURE — 97535 SELF CARE MNGMENT TRAINING: CPT

## 2018-10-31 PROCEDURE — 25000003 PHARM REV CODE 250: Performed by: STUDENT IN AN ORGANIZED HEALTH CARE EDUCATION/TRAINING PROGRAM

## 2018-10-31 PROCEDURE — 99232 SBSQ HOSP IP/OBS MODERATE 35: CPT | Mod: ,,, | Performed by: NURSE PRACTITIONER

## 2018-10-31 PROCEDURE — 99231 SBSQ HOSP IP/OBS SF/LOW 25: CPT | Mod: ,,, | Performed by: HOSPITALIST

## 2018-10-31 PROCEDURE — 25000242 PHARM REV CODE 250 ALT 637 W/ HCPCS: Performed by: STUDENT IN AN ORGANIZED HEALTH CARE EDUCATION/TRAINING PROGRAM

## 2018-10-31 PROCEDURE — 97530 THERAPEUTIC ACTIVITIES: CPT

## 2018-10-31 PROCEDURE — 11000001 HC ACUTE MED/SURG PRIVATE ROOM

## 2018-10-31 PROCEDURE — S4991 NICOTINE PATCH NONLEGEND: HCPCS | Performed by: STUDENT IN AN ORGANIZED HEALTH CARE EDUCATION/TRAINING PROGRAM

## 2018-10-31 PROCEDURE — 63600175 PHARM REV CODE 636 W HCPCS: Performed by: STUDENT IN AN ORGANIZED HEALTH CARE EDUCATION/TRAINING PROGRAM

## 2018-10-31 RX ADMIN — RAMELTEON 8 MG: 8 TABLET, FILM COATED ORAL at 09:10

## 2018-10-31 RX ADMIN — DEXAMETHASONE 4 MG: 4 TABLET ORAL at 03:10

## 2018-10-31 RX ADMIN — DEXAMETHASONE 4 MG: 4 TABLET ORAL at 05:10

## 2018-10-31 RX ADMIN — LEVETIRACETAM 500 MG: 500 TABLET ORAL at 09:10

## 2018-10-31 RX ADMIN — NICOTINE 1 PATCH: 14 PATCH, EXTENDED RELEASE TRANSDERMAL at 09:10

## 2018-10-31 RX ADMIN — FINASTERIDE 5 MG: 5 TABLET, FILM COATED ORAL at 09:10

## 2018-10-31 RX ADMIN — HEPARIN SODIUM 5000 UNITS: 5000 INJECTION, SOLUTION INTRAVENOUS; SUBCUTANEOUS at 03:10

## 2018-10-31 RX ADMIN — TAMSULOSIN HYDROCHLORIDE 0.4 MG: 0.4 CAPSULE ORAL at 09:10

## 2018-10-31 RX ADMIN — PANTOPRAZOLE SODIUM 40 MG: 40 TABLET, DELAYED RELEASE ORAL at 09:10

## 2018-10-31 RX ADMIN — TIOTROPIUM BROMIDE 18 MCG: 18 CAPSULE ORAL; RESPIRATORY (INHALATION) at 09:10

## 2018-10-31 RX ADMIN — HEPARIN SODIUM 5000 UNITS: 5000 INJECTION, SOLUTION INTRAVENOUS; SUBCUTANEOUS at 09:10

## 2018-10-31 RX ADMIN — AMLODIPINE BESYLATE 5 MG: 5 TABLET ORAL at 09:10

## 2018-10-31 RX ADMIN — ASPIRIN 81 MG: 81 TABLET, COATED ORAL at 09:10

## 2018-10-31 RX ADMIN — HYDROCODONE BITARTRATE AND ACETAMINOPHEN 1 TABLET: 5; 325 TABLET ORAL at 07:10

## 2018-10-31 RX ADMIN — GABAPENTIN 300 MG: 300 CAPSULE ORAL at 09:10

## 2018-10-31 RX ADMIN — DEXAMETHASONE 4 MG: 4 TABLET ORAL at 09:10

## 2018-10-31 RX ADMIN — GABAPENTIN 300 MG: 300 CAPSULE ORAL at 08:10

## 2018-10-31 RX ADMIN — GABAPENTIN 300 MG: 300 CAPSULE ORAL at 03:10

## 2018-10-31 RX ADMIN — HEPARIN SODIUM 5000 UNITS: 5000 INJECTION, SOLUTION INTRAVENOUS; SUBCUTANEOUS at 05:10

## 2018-10-31 RX ADMIN — ATORVASTATIN CALCIUM 80 MG: 20 TABLET, FILM COATED ORAL at 09:10

## 2018-10-31 RX ADMIN — LEVETIRACETAM 500 MG: 500 TABLET ORAL at 08:10

## 2018-10-31 NOTE — PROGRESS NOTES
[facility] Hospital Medicine  Progress Note    Patient Name: Lorenzo Tran Sr.  MRN: [id number]  Patient Class: IP- Inpatient   Admission Date: 10/23/2018  Length of Stay: 8 days  Attending Physician: Vickie Anderson MD  Primary Care Provider: Padmini Dailey [facility] Medicine Team: [facility] MED 1 YUNIOR Wu    Subjective:     Principal Problem:Brain metastasis    HPI:  66 yo M w/ PMH of HTN, CAD s/p CABG, carotid artery disease s/p stenting, and elevated PSA who presents as transfer for evaluation of brain lesion. Patient was brought to [facility] ED by his family w/ concern of altered mental status and generalized weakness. Family was concerned that he was not acting like himself. Patient shares that yesterday he attempted to take a bath and was unable to lift himself out of the tub. He waited 4 hours until a family member called EMS. He endorses chronic RLE weakness secondary to work related incident approximately 30 years ago. He also endorses new RUE weakness. On evaluation in the ED, he was noted to have very mild R facial droop. CT head demonstrated vasogenic edema w/ 1 cm nodular lesion in the L frontal lobe. CXR was significant for mass like consolidations in the L lung. Complains of chronic cough w/ occasional sputum. Shares that he had 1 episode of hemoptysis 2-3 months ago. Patient has no previous lung history but endorses a long history of smoking cigars. Reports that he would smoke a pack of cigars a day. In the ED prior to transfer he received decadron 10mg. No seizure like activity noted and no previous seizure history. Reports shortness of breath and urinary symptoms. Denies fever, chills, chest pain, and abdominal pain.       Hospital Course:  10/25 underwent bronchoscopy, biopsy was taken from the his lung mass, MRI brain showed frontal mass with vasogenic edema, abdominal CT showed multiple liver mass concerning for met, neurosurgery consulted will  involve hem/och tomorrow.   10/26 U/S testicles showed right sided hydrocele,will follow with urology out patient, biopsy still in process, no acute interventions by neurosurgery. hem/onc following and developing treatment plan.  10/27-28-29 Pt is stable, except his BP in which we added amlodipine for a better control.  10/30 Plan for rehab placement is pending. Pt will F/U with hem/onc @ [facility]. In regards to radiation therapy for his brain lesion, Neurosurgery recommended to perform the procedure in [facility] as an outpatient.  10/31 patient denied multiple rehabs, SW/CM working on placement issues.       Review of Systems   Constitutional: Negative for chills, diaphoresis, fatigue and fever.   Respiratory: Negative for cough and shortness of breath.    Cardiovascular: Negative for chest pain, palpitations and leg swelling.   Gastrointestinal: Negative for abdominal pain, blood in stool, diarrhea, nausea and vomiting.   Genitourinary: Positive for scrotal swelling. Negative for decreased urine volume, flank pain and hematuria.   Skin: Negative for rash and wound.   Neurological: Positive for weakness, numbness and headaches. Negative for tremors, seizures and facial asymmetry.   Psychiatric/Behavioral: Negative for agitation and confusion.   Interval history: complained of headache and chroninc right lower extremity numbness.   Objective:     Vital Signs (Most Recent):  Temp: 99 °F (37.2 °C) (10/31/18 1124)  Pulse: (!) 54 (10/31/18 1124)  Resp: 20 (10/31/18 1124)  BP: (!) 150/72 (10/31/18 1124)  SpO2: 96 % (10/31/18 1124) Vital Signs (24h Range):  Temp:  [98 °F (36.7 °C)-99 °F (37.2 °C)] 99 °F (37.2 °C)  Pulse:  [49-60] 54  Resp:  [14-20] 20  SpO2:  [94 %-97 %] 96 %  BP: (123-150)/(60-73) 150/72     Weight: 74.8 kg (165 lb 0 oz)  Body mass index is 25.84 kg/m².    Intake/Output Summary (Last 24 hours) at 10/31/2018 1356  Last data filed at 10/31/2018 1008  Gross per 24 hour   Intake 980 ml   Output 0 ml   Net 980 ml       Physical Exam   Constitutional: He is oriented to person, place, and time. He appears well-developed and well-nourished. No distress.   HENT:   Head: Normocephalic and atraumatic.   Mouth/Throat: Oropharynx is clear and moist. No oropharyngeal exudate.   Eyes: Conjunctivae and EOM are normal.   Neck: Normal range of motion. Neck supple.   Cardiovascular: Normal rate, regular rhythm, normal heart sounds and intact distal pulses.   No murmur heard.  Pulmonary/Chest: Effort normal and breath sounds normal. No respiratory distress. He has no rales. He exhibits no tenderness.   Lt lung mildly decrease in breath sounds    Abdominal: Soft. Bowel sounds are normal. He exhibits no distension. There is no tenderness.   Genitourinary:   Genitourinary Comments: Right sided scrotal swelling, soft, non tender, prostate exam -ve for hard masses or tenderness    Musculoskeletal: Normal range of motion. He exhibits no edema, tenderness or deformity.   Neurological: He is alert and oriented to person, place, and time. A sensory deficit is present.   AAOx3  Mild expressive aphasia(improving)  Symmetric smile; no gross CN deficits  Strength 5/5 on L, 4/5 RUE, 3/5 RLE     Skin: Skin is warm and dry.   Vitals reviewed.      Significant Labs:   BMP:   Recent Labs   Lab 10/30/18  0406   *   *   K 3.9      CO2 22*   BUN 17   CREATININE 0.8   CALCIUM 8.4*   MG 2.1     CBC:   Recent Labs   Lab 10/30/18  0406   WBC 13.28*   HGB 12.4*   HCT 38.9*        CMP:   Recent Labs   Lab 10/30/18  0406   *   K 3.9      CO2 22*   *   BUN 17   CREATININE 0.8   CALCIUM 8.4*   PROT 5.8*   ALBUMIN 2.3*   BILITOT 0.3   ALKPHOS 61   AST 14   ALT 31   ANIONGAP 8   EGFRNONAA >60.0     Assessment/Plan:      * Brain metastasis    Lung mass  Vasogenic cerebral edema  66 yo M w/ PMH of HTN, CAD s/p CABG, carotid artery disease s/p stenting, and elevated PSA who presents as transfer for evaluation of brain lesion w/ newly  found mass-like consolidations on CXR. Concern for primary lung lesion w/ significant smoking history and brain metastasis w/ vasogenic edema. Mild expressive aphasia, R>L weakness, difficulty w/ mobility. Hypertensive concerning for Cushing reflex secondary to cerebral edema. HR wnl.   - IV decadron 4mg q6. Pt has mild increase in WBC mostly due to white shift from steroid usage.   - keppra 500 mg BID  - CT Neck/chest/abdomen/pelvis w/ contrast showed b/l lung mass, liver masses and  MRI brain showed frontal brain mass  - underwent bronch biopsy showed non-small cell lung ca  - neurosurgery following appreciate their recs, they F/U with as an outpatient, and no procedures to be done other than XRT treatment   - hem/onc updated us today that the Pt will FU with Hem/Onc @ [facility]. Next appointment is next Tuesday 11/6/2018.    Neurosurgery latest Recs:  - Continue dex for cerebral edema. Currently on 4 mg q 6 hours. Recommend 2 week taper down to 2 mg BID, if ok per primary team and HemeOnc.  - Continue Keppra 500mg for seizure prophylaxis   - Maintain SBP <160     Hyponatremia    Levels are mildly low  - Most likely due to SIADH       Discharge planning issues    Discharge Recommendations:  rehabilitation facility   Discharge Equipment Recommendations: walker, rolling     Pending rehab placement      Testicle swelling    Unsure duration  - patient reported it was noticed by his PCP 6 month ago  - denies pain, reported difficulty urination, dysuria and dripping   - right testicular swelling, soft,non tender. Soft non tender prostate on exam.  - continue Flomax and finasteride  - U/s testicles consistent with right testicles hydrocele   - will arrange urology appointment upon discharge      Lab Results   Component Value Date    PSA 2.2 10/25/2018    PSA 6.9 (H) 05/18/2017              Vasogenic cerebral edema    Check brain mets       Lung mass    - Biopsy showed non-small cell lung ca, and cytology still pending      Plan:  - Rehab placement is still pending.  - Pt will FU with Hem/Onc @ hospitals. Next appointment is next Tuesday 11/6/2018.     Chest pain    - Described as sharp, radiating from his R chest across to the L, non-reproducible  - Reports that this is a chronic occurrence, comes and goes   - Concern for MSK vs ACS vs PE vs likely cancerous masses on CXR  - Obtained EKG, negative for STEMI;   - resumed aspirin 81 mg  --- resolved, Pt has PRN pain meds      Essential hypertension    - stable  - resume coreg 12.5 mg BID  - possibly secondary to cushing reflex, will continue to monitor  - In hospital amlodipine 5 mg was added      MCCRAY (dyspnea on exertion)    - Chronic likely secondary to COPD given CXR findings and smoking history  - Not on O2 at home  - duonebs PRN  - On tiotropium daily       Elevated PSA    - hx of elevated PSA  - nursing reports of urinary incontinence, dribbling, weak stream  - resume finasteride, tamsulosin       Coronary artery disease involving native heart    On coreg, Asprin and atorvastatin        Tobacco abuse    - acknowledges need to quit  - would benefit from smoking cessation counseling  - nicotine patch.          VTE Risk Mitigation (From admission, onward)        Ordered     heparin (porcine) injection 5,000 Units  Every 8 hours      10/26/18 0915     IP VTE HIGH RISK PATIENT  Once      10/24/18 1302              YUNIOR Wu  Department of Hospital Medicine   [facility]

## 2018-10-31 NOTE — SUBJECTIVE & OBJECTIVE
Review of Systems   Constitutional: Negative for chills, diaphoresis, fatigue and fever.   Respiratory: Negative for cough and shortness of breath.    Cardiovascular: Negative for chest pain, palpitations and leg swelling.   Gastrointestinal: Negative for abdominal pain, blood in stool, diarrhea, nausea and vomiting.   Genitourinary: Positive for scrotal swelling. Negative for decreased urine volume, flank pain and hematuria.   Skin: Negative for rash and wound.   Neurological: Positive for weakness, numbness and headaches. Negative for tremors, seizures and facial asymmetry.   Psychiatric/Behavioral: Negative for agitation and confusion.   Interval history: complained of headache and chroninc right lower extremity numbness.   Objective:     Vital Signs (Most Recent):  Temp: 99 °F (37.2 °C) (10/31/18 1124)  Pulse: (!) 54 (10/31/18 1124)  Resp: 20 (10/31/18 1124)  BP: (!) 150/72 (10/31/18 1124)  SpO2: 96 % (10/31/18 1124) Vital Signs (24h Range):  Temp:  [98 °F (36.7 °C)-99 °F (37.2 °C)] 99 °F (37.2 °C)  Pulse:  [49-60] 54  Resp:  [14-20] 20  SpO2:  [94 %-97 %] 96 %  BP: (123-150)/(60-73) 150/72     Weight: 74.8 kg (165 lb 0 oz)  Body mass index is 25.84 kg/m².    Intake/Output Summary (Last 24 hours) at 10/31/2018 1356  Last data filed at 10/31/2018 1008  Gross per 24 hour   Intake 980 ml   Output 0 ml   Net 980 ml      Physical Exam   Constitutional: He is oriented to person, place, and time. He appears well-developed and well-nourished. No distress.   HENT:   Head: Normocephalic and atraumatic.   Mouth/Throat: Oropharynx is clear and moist. No oropharyngeal exudate.   Eyes: Conjunctivae and EOM are normal.   Neck: Normal range of motion. Neck supple.   Cardiovascular: Normal rate, regular rhythm, normal heart sounds and intact distal pulses.   No murmur heard.  Pulmonary/Chest: Effort normal and breath sounds normal. No respiratory distress. He has no rales. He exhibits no tenderness.   Lt lung mildly decrease in  breath sounds    Abdominal: Soft. Bowel sounds are normal. He exhibits no distension. There is no tenderness.   Genitourinary:   Genitourinary Comments: Right sided scrotal swelling, soft, non tender, prostate exam -ve for hard masses or tenderness    Musculoskeletal: Normal range of motion. He exhibits no edema, tenderness or deformity.   Neurological: He is alert and oriented to person, place, and time. A sensory deficit is present.   AAOx3  Mild expressive aphasia(improving)  Symmetric smile; no gross CN deficits  Strength 5/5 on L, 4/5 RUE, 3/5 RLE     Skin: Skin is warm and dry.   Vitals reviewed.      Significant Labs:   BMP:   Recent Labs   Lab 10/30/18  0406   *   *   K 3.9      CO2 22*   BUN 17   CREATININE 0.8   CALCIUM 8.4*   MG 2.1     CBC:   Recent Labs   Lab 10/30/18  0406   WBC 13.28*   HGB 12.4*   HCT 38.9*        CMP:   Recent Labs   Lab 10/30/18  0406   *   K 3.9      CO2 22*   *   BUN 17   CREATININE 0.8   CALCIUM 8.4*   PROT 5.8*   ALBUMIN 2.3*   BILITOT 0.3   ALKPHOS 61   AST 14   ALT 31   ANIONGAP 8   EGFRNONAA >60.0

## 2018-10-31 NOTE — SUBJECTIVE & OBJECTIVE
Interval History 10/31/2018:  Patient is seen for follow-up rehab evaluation and recommendations: No acute events over night. Participating with therapy.     HPI, Past Medical, Family, and Social History remains the same as documented in the initial encounter.    Scheduled Medications:    amLODIPine  5 mg Oral Daily    aspirin  81 mg Oral Daily    atorvastatin  80 mg Oral Daily    dexamethasone  4 mg Oral Q8H    Followed by    [START ON 11/3/2018] dexamethasone  2 mg Oral Q6H    Followed by    [START ON 11/7/2018] dexamethasone  2 mg Oral Q8H    Followed by    [START ON 11/11/2018] dexamethasone  2 mg Oral Q12H    finasteride  5 mg Oral Daily    gabapentin  300 mg Oral TID    heparin (porcine)  5,000 Units Subcutaneous Q8H    levETIRAcetam  500 mg Oral BID    nicotine  1 patch Transdermal Daily    pantoprazole  40 mg Oral Daily    tamsulosin  0.4 mg Oral Daily    tiotropium  1 capsule Inhalation Daily     Diagnostic Results:   Labs: Reviewed    PRN Medications: dextrose 50%, dextrose 50%, glucagon (human recombinant), glucose, glucose, hydrALAZINE, HYDROcodone-acetaminophen, insulin aspart U-100, ramelteon, sodium chloride 0.9%    Review of Systems   Constitutional: Negative for fatigue and fever.   HENT: Negative for trouble swallowing and voice change.    Respiratory: Positive for cough. Negative for shortness of breath.    Cardiovascular: Negative for chest pain and leg swelling.   Gastrointestinal: Negative for abdominal distention and abdominal pain.   Genitourinary: Negative for difficulty urinating and flank pain.   Musculoskeletal: Positive for gait problem. Negative for back pain.   Skin: Negative for color change and rash.   Neurological: Positive for weakness and numbness (RLE and R hip). Negative for speech difficulty.   Psychiatric/Behavioral: Positive for confusion. Negative for agitation.     Objective:     Vital Signs (Most Recent):  Temp: 99 °F (37.2 °C) (10/31/18 1124)  Pulse: (!) 54  (10/31/18 1124)  Resp: 20 (10/31/18 1124)  BP: (!) 150/72 (10/31/18 1124)  SpO2: 96 % (10/31/18 1124)    Vital Signs (24h Range):  Temp:  [98 °F (36.7 °C)-99 °F (37.2 °C)] 99 °F (37.2 °C)  Pulse:  [49-60] 54  Resp:  [14-20] 20  SpO2:  [94 %-97 %] 96 %  BP: (123-150)/(60-73) 150/72     Physical Exam   Constitutional: He appears well-developed and well-nourished.   HENT:   Head: Normocephalic and atraumatic.   Eyes: EOM are normal. Pupils are equal, round, and reactive to light. Right eye exhibits no discharge. Left eye exhibits no discharge.   Neck: Neck supple.   Pulmonary/Chest: Effort normal. No respiratory distress.   Abdominal: He exhibits no distension. There is no tenderness.   Musculoskeletal: He exhibits no edema or deformity.   Neurological:   -  Mental Status: Awake and alert. Slow to answer questions. Mild confusion present.   -  Vision: blurry vision improves with glasses  - Motor: RUE: 4/5.  LUE: 5/5.  RLE: 4/5.  LLE: 4/5.  -  Tone: normal  -  Sensory:  Intact to light touch and pin prick.   Skin: Skin is warm and dry.   Psychiatric: He has a normal mood and affect. His behavior is normal.     NEUROLOGICAL EXAMINATION:     CRANIAL NERVES     CN III, IV, VI   Pupils are equal, round, and reactive to light.  Extraocular motions are normal.

## 2018-10-31 NOTE — PLAN OF CARE
Problem: Occupational Therapy Goal  Goal: Occupational Therapy Goal  Goals to be met by: 11/20     Patient will increase functional independence with ADLs by performing:    UE Dressing with Set-up Assistance.  LE Dressing with Contact Guard Assistance.  Grooming while seated with Set-up Assistance.  Toileting from toilet with Contact Guard Assistance for hygiene and clothing management.      Outcome: Ongoing (interventions implemented as appropriate)  Goals remain appropriate. Cont POC.    LUIS Vizcarra  10/31/2018  Pager: [phone]

## 2018-10-31 NOTE — PLAN OF CARE
Problem: Patient Care Overview  Goal: Plan of Care Review  Outcome: Ongoing (interventions implemented as appropriate)   10/31/18 7908   Coping/Psychosocial   Plan Of Care Reviewed With patient   Patient is awake and alert, able to verbalized needs and wants. Respirations regular and unlabored on room air. POC ongoing and reviewed with the patient. Questions and concerns addressed. Denies pain, needs attended. No acute distress noted. Safety maintained. Call light and personal belongings within reached.

## 2018-10-31 NOTE — PLAN OF CARE
Problem: Patient Care Overview  Goal: Plan of Care Review  Outcome: Ongoing (interventions implemented as appropriate)  Pt remains free of falls and injury. Pt makes statement of no pain. Pt makes frequent position changes. Bed low and locked, Call light within reach.

## 2018-10-31 NOTE — PT/OT/SLP PROGRESS
Occupational Therapy   Treatment    Name: Lorenzo Tran Sr.  MRN: [id number]  Admitting Diagnosis:  Brain metastasis       Recommendations:     Discharge Recommendations: rehabilitation facility  Discharge Equipment Recommendations:  walker, rolling, cane, straight  Barriers to discharge:  Inaccessible home environment, Decreased caregiver support    Subjective     Communicated with: RN prior to session.  Pain/Comfort:  · Pain Rating 1: 0/10  · Pain Rating Post-Intervention 1: 0/10    Patients cultural, spiritual, Scientologist conflicts given the current situation: none stated    Objective:     Patient found with: (none)    General Precautions: Standard, fall   Orthopedic Precautions:N/A   Braces: N/A     Occupational Performance:    Bed Mobility:    · Patient completed Supine to Sit with minimum assistance , pt required more tactile cues to initiate movement toward EOB than actual physical assistance (except CGA group home through transition from R sidelying to sitting EOB)    Functional Mobility/Transfers:  · Patient completed Sit <> Stand Transfer with contact guard assistance  with  no assistive device   · Patient completed Bed <> Chair Transfer using Stand Pivot technique with contact guard assistance with no assistive device  · Functional Mobility: SBA in stance, CGA after 1 minute 2/2 fatigue and increased sway. Pt able to practice sit to stand t/f 5x from bedside chair with SBA to stand, and CGA to sit safely and controlled.    Activities of Daily Living:  · Lower Body Dressing: contact guard assistance to don L sock EOB; SBA to don R socks; both socks donned by crossing leg over knee (not figure-4)    Patient left up in chair with call button in reach    AMPAC 6 Click:  AMPAC Total Score: 19    Treatment & Education:  Pt ed re OT role and POC. Pt ed re safety. Pt ed re technique for donning socks. Pt ed re sit to stand t/f and stand to sit transition.  Education:    Assessment:     Lorenzo Tran Sr.  is a 67 y.o. male with a medical diagnosis of Brain metastasis.  He presents with the following performance deficits affecting function: weakness, impaired endurance, impaired self care skills, impaired functional mobilty, gait instability, impaired balance, decreased lower extremity function.  Pt participates well and is motivated to regain functional independence. Pt demo increased agitation with OT encouragement to attempt to perform self care tasks without assistance, and to practice standing with OT CGA and no AD. Pt took direction well however, and demo improved balance in stance and improved sit to stand and stand to sit safety. Pt is progressing well.    Rehab Prognosis:  Good; patient would benefit from acute skilled OT services to address these deficits and reach maximum level of function.       Plan:     Patient to be seen 3 x/week to address the above listed problems via self-care/home management, therapeutic activities, therapeutic exercises  · Plan of Care Expires: 11/26/18  · Plan of Care Reviewed with: patient    This Plan of care has been discussed with the patient who was involved in its development and understands and is in agreement with the identified goals and treatment plan    GOALS:   Multidisciplinary Problems     Occupational Therapy Goals        Problem: Occupational Therapy Goal    Goal Priority Disciplines Outcome Interventions   Occupational Therapy Goal     OT, PT/OT Ongoing (interventions implemented as appropriate)    Description:  Goals to be met by: 11/20     Patient will increase functional independence with ADLs by performing:    UE Dressing with Set-up Assistance.  LE Dressing with Contact Guard Assistance.  Grooming while seated with Set-up Assistance.  Toileting from toilet with Contact Guard Assistance for hygiene and clothing management.                       Time Tracking:     OT Date of Treatment: 10/31/18  OT Start Time: 1306  OT Stop Time: 1330  OT Total Time (min): 24  min    Billable Minutes:Self Care/Home Management 8 minutes  Therapeutic Activity 16 minutes    LUIS Vizcarra  10/31/2018  Pager: [phone]

## 2018-10-31 NOTE — PROGRESS NOTES
[facility]  Physical Medicine & Rehab  Progress Note    Patient Name: Lorenzo Tran Sr.  MRN: [id number]  Admission Date: 10/23/2018  Length of Stay: 8 days  Attending Physician: Vickie Anderson MD    Subjective:     Principal Problem:Brain metastasis    Hospital Course:   10/26/2018: Evaluated by OT.  Bed mobility Jeffrey.  Transfers Jeffrey Adl's Jeffrey.   10/29/2018: Participated with OT.  Bed mobility Jeffrey.  Sit to stand Jeffrey w/ RW. Transfers Jeffrey w/ RW. Grooming CGA. UBD Jeffrey. LBD totalA. Toileting Jeffrey.     Interval History 10/31/2018:  Patient is seen for follow-up rehab evaluation and recommendations: No acute events over night. Participating with therapy.     HPI, Past Medical, Family, and Social History remains the same as documented in the initial encounter.    Scheduled Medications:    amLODIPine  5 mg Oral Daily    aspirin  81 mg Oral Daily    atorvastatin  80 mg Oral Daily    dexamethasone  4 mg Oral Q8H    Followed by    [START ON 11/3/2018] dexamethasone  2 mg Oral Q6H    Followed by    [START ON 11/7/2018] dexamethasone  2 mg Oral Q8H    Followed by    [START ON 11/11/2018] dexamethasone  2 mg Oral Q12H    finasteride  5 mg Oral Daily    gabapentin  300 mg Oral TID    heparin (porcine)  5,000 Units Subcutaneous Q8H    levETIRAcetam  500 mg Oral BID    nicotine  1 patch Transdermal Daily    pantoprazole  40 mg Oral Daily    tamsulosin  0.4 mg Oral Daily    tiotropium  1 capsule Inhalation Daily     Diagnostic Results:   Labs: Reviewed    PRN Medications: dextrose 50%, dextrose 50%, glucagon (human recombinant), glucose, glucose, hydrALAZINE, HYDROcodone-acetaminophen, insulin aspart U-100, ramelteon, sodium chloride 0.9%    Review of Systems   Constitutional: Negative for fatigue and fever.   HENT: Negative for trouble swallowing and voice change.    Respiratory: Positive for cough. Negative for shortness of breath.    Cardiovascular: Negative for chest pain and leg  swelling.   Gastrointestinal: Negative for abdominal distention and abdominal pain.   Genitourinary: Negative for difficulty urinating and flank pain.   Musculoskeletal: Positive for gait problem. Negative for back pain.   Skin: Negative for color change and rash.   Neurological: Positive for weakness and numbness (RLE and R hip). Negative for speech difficulty.   Psychiatric/Behavioral: Positive for confusion. Negative for agitation.     Objective:     Vital Signs (Most Recent):  Temp: 99 °F (37.2 °C) (10/31/18 1124)  Pulse: (!) 54 (10/31/18 1124)  Resp: 20 (10/31/18 1124)  BP: (!) 150/72 (10/31/18 1124)  SpO2: 96 % (10/31/18 1124)    Vital Signs (24h Range):  Temp:  [98 °F (36.7 °C)-99 °F (37.2 °C)] 99 °F (37.2 °C)  Pulse:  [49-60] 54  Resp:  [14-20] 20  SpO2:  [94 %-97 %] 96 %  BP: (123-150)/(60-73) 150/72     Physical Exam   Constitutional: He appears well-developed and well-nourished.   HENT:   Head: Normocephalic and atraumatic.   Eyes: EOM are normal. Pupils are equal, round, and reactive to light. Right eye exhibits no discharge. Left eye exhibits no discharge.   Neck: Neck supple.   Pulmonary/Chest: Effort normal. No respiratory distress.   Abdominal: He exhibits no distension. There is no tenderness.   Musculoskeletal: He exhibits no edema or deformity.   Neurological:   -  Mental Status: Awake and alert. Slow to answer questions. Mild confusion present.   -  Vision: blurry vision improves with glasses  - Motor: RUE: 4/5.  LUE: 5/5.  RLE: 4/5.  LLE: 4/5.  -  Tone: normal  -  Sensory:  Intact to light touch and pin prick.   Skin: Skin is warm and dry.   Psychiatric: He has a normal mood and affect. His behavior is normal.     Assessment/Plan:      * Brain metastasis    - CT head demonstrated vasogenic edema w/ 1 cm nodular lesion in the L frontal lobe.   - CXR was significant for mass like consolidations in the L lung. Complaint of chronic cough w/ occasional sputum.   - 10/25 underwent bronchoscopy, biopsy  was taken from the his lung mass, bx still pending.   - Oncology following and suspecting non small cell lung carcinoma with metastases to brain, official pathology report pending.   - NSGY following and no acute neurosurgical intervention indicated, plan for outpt radisurgery.      Impaired mobility and activities of daily living    - Related to prolonged/acute hospital course.     Recommendations  -  Encourage mobility, OOB in chair at least 3 hours per day, and early ambulation as appropriate  -  PT/OT evaluate and treat  -  Pain management  -  Monitor for and prevent skin breakdown and pressure ulcers  · Early mobility, repositioning/weight shifting every 20-30 minutes when sitting, turn patient every 2 hours, proper mattress/overlay and chair cushioning, pressure relief/heel protector boots  -  DVT prophylaxis    -  Reviewed discharge options (IP rehab, SNF, HH therapy, and OP therapy)     Non-small cell carcinoma of lung    - see brain metastasis     Testicle swelling    - US consistent with right testicle hydrocele, f/u out pt urology     Lung mass    - 10/25 underwent bronchoscopy, biopsy was taken from the his lung mass, bx still pending.   - Oncology following and official pathology non small cell lung carcinoma with metastases to brain.        Recommend Inpatient Rehab.  IRF preference per patient/[facility].  Barriers to IRF admission:  Out of network for [facility].     Za Neves NP  Department of Physical Medicine & Rehab   [facility]

## 2018-11-01 LAB
POCT GLUCOSE: 112 MG/DL (ref 70–110)
POCT GLUCOSE: 161 MG/DL (ref 70–110)
POCT GLUCOSE: 178 MG/DL (ref 70–110)
POCT GLUCOSE: 216 MG/DL (ref 70–110)

## 2018-11-01 PROCEDURE — 25000003 PHARM REV CODE 250: Performed by: STUDENT IN AN ORGANIZED HEALTH CARE EDUCATION/TRAINING PROGRAM

## 2018-11-01 PROCEDURE — 63600175 PHARM REV CODE 636 W HCPCS: Performed by: STUDENT IN AN ORGANIZED HEALTH CARE EDUCATION/TRAINING PROGRAM

## 2018-11-01 PROCEDURE — 99231 SBSQ HOSP IP/OBS SF/LOW 25: CPT | Mod: ,,, | Performed by: HOSPITALIST

## 2018-11-01 PROCEDURE — 11000001 HC ACUTE MED/SURG PRIVATE ROOM

## 2018-11-01 PROCEDURE — 97530 THERAPEUTIC ACTIVITIES: CPT

## 2018-11-01 PROCEDURE — S4991 NICOTINE PATCH NONLEGEND: HCPCS | Performed by: STUDENT IN AN ORGANIZED HEALTH CARE EDUCATION/TRAINING PROGRAM

## 2018-11-01 PROCEDURE — 25000242 PHARM REV CODE 250 ALT 637 W/ HCPCS: Performed by: STUDENT IN AN ORGANIZED HEALTH CARE EDUCATION/TRAINING PROGRAM

## 2018-11-01 PROCEDURE — 97116 GAIT TRAINING THERAPY: CPT

## 2018-11-01 RX ORDER — LORAZEPAM 0.5 MG/1
0.5 TABLET ORAL ONCE AS NEEDED
Status: COMPLETED | OUTPATIENT
Start: 2018-11-01 | End: 2018-11-01

## 2018-11-01 RX ADMIN — HEPARIN SODIUM 5000 UNITS: 5000 INJECTION, SOLUTION INTRAVENOUS; SUBCUTANEOUS at 06:11

## 2018-11-01 RX ADMIN — GABAPENTIN 300 MG: 300 CAPSULE ORAL at 09:11

## 2018-11-01 RX ADMIN — TIOTROPIUM BROMIDE 18 MCG: 18 CAPSULE ORAL; RESPIRATORY (INHALATION) at 10:11

## 2018-11-01 RX ADMIN — ATORVASTATIN CALCIUM 80 MG: 20 TABLET, FILM COATED ORAL at 09:11

## 2018-11-01 RX ADMIN — AMLODIPINE BESYLATE 5 MG: 5 TABLET ORAL at 09:11

## 2018-11-01 RX ADMIN — HYDROCODONE BITARTRATE AND ACETAMINOPHEN 1 TABLET: 5; 325 TABLET ORAL at 03:11

## 2018-11-01 RX ADMIN — PANTOPRAZOLE SODIUM 40 MG: 40 TABLET, DELAYED RELEASE ORAL at 09:11

## 2018-11-01 RX ADMIN — GABAPENTIN 300 MG: 300 CAPSULE ORAL at 03:11

## 2018-11-01 RX ADMIN — LEVETIRACETAM 500 MG: 500 TABLET ORAL at 09:11

## 2018-11-01 RX ADMIN — LORAZEPAM 0.5 MG: 0.5 TABLET ORAL at 03:11

## 2018-11-01 RX ADMIN — TRAZODONE HYDROCHLORIDE 25 MG: 50 TABLET ORAL at 09:11

## 2018-11-01 RX ADMIN — NICOTINE 1 PATCH: 14 PATCH, EXTENDED RELEASE TRANSDERMAL at 09:11

## 2018-11-01 RX ADMIN — TAMSULOSIN HYDROCHLORIDE 0.4 MG: 0.4 CAPSULE ORAL at 09:11

## 2018-11-01 RX ADMIN — DEXAMETHASONE 4 MG: 4 TABLET ORAL at 09:11

## 2018-11-01 RX ADMIN — HEPARIN SODIUM 5000 UNITS: 5000 INJECTION, SOLUTION INTRAVENOUS; SUBCUTANEOUS at 03:11

## 2018-11-01 RX ADMIN — DEXAMETHASONE 4 MG: 4 TABLET ORAL at 06:11

## 2018-11-01 RX ADMIN — DEXAMETHASONE 4 MG: 4 TABLET ORAL at 03:11

## 2018-11-01 RX ADMIN — HYDROCODONE BITARTRATE AND ACETAMINOPHEN 1 TABLET: 5; 325 TABLET ORAL at 01:11

## 2018-11-01 RX ADMIN — ASPIRIN 81 MG: 81 TABLET, COATED ORAL at 09:11

## 2018-11-01 RX ADMIN — FINASTERIDE 5 MG: 5 TABLET, FILM COATED ORAL at 09:11

## 2018-11-01 RX ADMIN — HEPARIN SODIUM 5000 UNITS: 5000 INJECTION, SOLUTION INTRAVENOUS; SUBCUTANEOUS at 09:11

## 2018-11-01 NOTE — ASSESSMENT & PLAN NOTE
- Biopsy showed non-small cell lung ca, and cytology has similar results     Plan:  - Rehab placement is still pending.  - Pt will FU with Hem/Onc @ hospitals. Next appointment is next Tuesday 11/6/2018.

## 2018-11-01 NOTE — ASSESSMENT & PLAN NOTE
- hx of elevated PSA  - nursing reports of urinary incontinence, dribbling, weak stream  - resume finasteride, tamsulosin  Not an active problem

## 2018-11-01 NOTE — PLAN OF CARE
Problem: Physical Therapy Goal  Goal: Physical Therapy Goal  Goals to be met by: 18     Patient will increase functional independence with mobility by performin. Sit to stand transfer with Standby Assistance  2. Gait  x 50 feet with Contact Guard Assistance using Rolling Walker.- MET  Revised- Gait x 125 ft with SBA using RW.    3. Ascend/descend 6 stair with bilateral Handrails Minimal Assistance using no AD.   4. Lower extremity exercise program x15 reps per handout, with supervision      Outcome: Ongoing (interventions implemented as appropriate)  PT for gait training and therapeutic activity. Pt met gait goal, which was updated to reflect his current status. Continue POC.    Clare Alvarado, SPT  2018

## 2018-11-01 NOTE — SUBJECTIVE & OBJECTIVE
Review of Systems   Constitutional: Positive for activity change. Negative for appetite change, chills, diaphoresis, fatigue and fever.   HENT: Negative for rhinorrhea and sore throat.    Eyes: Negative for photophobia and visual disturbance.   Respiratory: Negative for cough, chest tightness, shortness of breath and wheezing.    Cardiovascular: Negative for chest pain, palpitations and leg swelling.   Gastrointestinal: Negative for abdominal distention, abdominal pain, blood in stool, constipation, diarrhea, nausea and vomiting.   Genitourinary: Positive for scrotal swelling (Varicocele ). Negative for flank pain, frequency and hematuria.   Skin: Negative for rash and wound.   Neurological: Positive for weakness. Negative for dizziness, tremors, seizures, speech difficulty, light-headedness, numbness and headaches.   Hematological: Does not bruise/bleed easily.   Psychiatric/Behavioral: Positive for sleep disturbance. Negative for agitation and confusion.     Objective:     Vital Signs (Most Recent):  Temp: 98 °F (36.7 °C) (11/01/18 0840)  Pulse: (!) 58 (11/01/18 1009)  Resp: 16 (11/01/18 1009)  BP: (!) 167/78(Dr Anderson at bedside and is aware of pts BP) (11/01/18 0840)  SpO2: 98 % (11/01/18 0840) Vital Signs (24h Range):  Temp:  [97.1 °F (36.2 °C)-99.2 °F (37.3 °C)] 98 °F (36.7 °C)  Pulse:  [52-89] 58  Resp:  [15-20] 16  SpO2:  [93 %-98 %] 98 %  BP: (113-167)/(56-80) 167/78     Weight: 74.8 kg (165 lb 0 oz)  Body mass index is 25.84 kg/m².    Intake/Output Summary (Last 24 hours) at 11/1/2018 1020  Last data filed at 11/1/2018 0940  Gross per 24 hour   Intake 720 ml   Output --   Net 720 ml      Physical Exam   Constitutional: He is oriented to person, place, and time. He appears well-developed and well-nourished. No distress.   HENT:   Head: Normocephalic.   Eyes: No scleral icterus.   Cardiovascular: Normal rate, regular rhythm and normal heart sounds.   No murmur heard.  Pulmonary/Chest: Effort normal. No  respiratory distress. He exhibits no tenderness.   Abdominal: Soft. Bowel sounds are normal. He exhibits no distension. There is no tenderness.   Musculoskeletal: Normal range of motion. He exhibits no edema or tenderness.   Neurological: He is alert and oriented to person, place, and time.   Skin: He is not diaphoretic.       Significant Labs: BMP: No results for input(s): GLU, NA, K, CL, CO2, BUN, CREATININE, CALCIUM, MG in the last 48 hours.

## 2018-11-01 NOTE — ASSESSMENT & PLAN NOTE
- Described as sharp, radiating from his R chest across to the L, non-reproducible  - Reports that this is a chronic occurrence, comes and goes   - Obtained EKG, negative for STEMI;   - resumed aspirin 81 mg    Problem resolved, Pt has PRN pain meds

## 2018-11-01 NOTE — PLAN OF CARE
Rec'd insurance letter  O Rehab referral.     11/01/18 0728   [facility] Assessment   Can the patient answer the patient profile reliably? Yes, cognitively intact     Dc planning assistance available -per insurance company- Hardeep Asencio  [phone]  Option 8   Ext [phone]    CM spoke kina Meier and was given     Insurance Providers she would like CRISTIANA Shaikh to make referrals to [facility], [facility]    [facility]      [facility]    Need prior level of function  Updated pt/ot notes  H/p    [facility]  [facility]

## 2018-11-01 NOTE — ASSESSMENT & PLAN NOTE
- stable  - possibly secondary to cushing reflex, will continue to monitor  - Added In the hospital amlodipine 5 mg. @ home he takes coreg

## 2018-11-01 NOTE — ASSESSMENT & PLAN NOTE
Unsure duration  - patient reported it was noticed by his PCP several month ago  - denies pain, reported difficulty urination, dysuria and dripping   - right testicular swelling, soft,non tender. Soft non tender prostate on exam.  - continue Flomax and finasteride  - U/s testicles consistent with right testicles hydrocele   - urology appointment upon discharge      Lab Results   Component Value Date    PSA 2.2 10/25/2018    PSA 6.9 (H) 05/18/2017     Not an active problem

## 2018-11-01 NOTE — ASSESSMENT & PLAN NOTE
- Chronic likely secondary to COPD given CXR findings and smoking history  - Not on O2 at home  - duonebs PRN  - On tiotropium daily  Not an active problem

## 2018-11-01 NOTE — ASSESSMENT & PLAN NOTE
Lung mets to brain which causing Vasogenic cerebral edema  66 yo M w/ PMH of HTN, CAD s/p CABG, carotid artery disease s/p stenting, and elevated PSA who presents as transfer for evaluation of brain lesion w/ newly found mass-like consolidations on CXR. Concern for primary lung lesion w/ significant smoking history and brain metastasis w/ vasogenic edema. Mild expressive aphasia, R>L weakness, difficulty w/ mobility. Hypertensive concerning for Cushing reflex secondary to cerebral edema. HR wnl.   - IV decadron 4mg q6. Pt has mild increase in WBC mostly due to white shift from steroid usage.   - keppra 500 mg BID  - CT Neck/chest/abdomen/pelvis w/ contrast showed b/l lung mass, liver masses and  MRI brain showed frontal brain mass  - underwent bronch biopsy showed non-small cell lung ca  - neurosurgery following appreciate their recs, they F/U with as an outpatient, and no procedures to be done other than XRT treatment   - hem/onc updated us today that the Pt will FU with Hem/Onc @ [facility]. Next appointment is next Tuesday 11/6/2018.    Neurosurgery latest Recs:  - Continue dex for cerebral edema. Currently on 4 mg q 6 hours. Recommend 2 week taper down to 2 mg BID  - Continue Keppra 500mg for seizure prophylaxis   - Maintain SBP <160

## 2018-11-01 NOTE — PT/OT/SLP PROGRESS
Physical Therapy Treatment    Patient Name:  Lorenzo Tran Sr.   MRN:  [id number]    Recommendations:     Discharge Recommendations:  rehabilitation facility   Discharge Equipment Recommendations: walker, rolling   Barriers to discharge: Inaccessible home    Assessment:     Lorenzo Tran Sr. is a 67 y.o. male admitted with a medical diagnosis of Brain metastasis. He presents with the following impairments/functional limitations:  weakness, gait instability, impaired cardiopulmonary response to activity, impaired balance, impaired self care skills, impaired functional mobilty. Pt demonstrating impaired balance, requiring HHA and grabbing the wall for stability when ambulating without an AD. Pt's stability improved and assistance needed decreased when using a RW. Pt demonstrates very good motivation to participate in therapy.      Rehab Prognosis:  Good; patient would benefit from acute skilled PT services to address these deficits and reach maximum level of function.      Recent Surgery: * No surgery found *      Plan:     During this hospitalization, patient to be seen 3 x/week to address the above listed problems via gait training, therapeutic activities, therapeutic exercises  · Plan of Care Expires:  11/25/18   Plan of Care Reviewed with: patient    Subjective     Communicated with nsg prior to session.  Patient found supine, family present upon PT entry to room, agreeable to treatment.      Chief Complaint: dizziness  Patient comments/goals: I want to walk  Pain/Comfort:  Pain Rating 1: 0/10    Patients cultural, spiritual, Anabaptist conflicts given the current situation: none reported    Objective:     Patient found with: peripheral IV     General Precautions: Standard, fall   Orthopedic Precautions:N/A   Braces: N/A     Functional Mobility:  · Bed Mobility:     · Rolling Right: stand by assistance  · Scooting: stand by assistance  · Supine to Sit: stand by assistance  · Sit to Supine: stand by  assistance  · Transfers:     · Sit to Stand: x 1 with no AD and contact guard assistance and x 3 with CGA and rolling walker  · Toilet Transfer: contact guard assistance with grab bars using Step Transfer. Pt required min A toileting assistance  · Gait: 3 trials described below. No LOB throughout trials  · 80 ft with L HHA with minimal assistance for balance. Pt reaching for the wall for increased stability with the R hand  · 45 ft with RW with CGA and verbal cues for increased stride length and wider JAMAL  · 100 ft with RW with CGA and verbal cues for increased stride length and wider JAMAL. One standing rest break during this trial.       AM-PAC 6 CLICK MOBILITY  Turning over in bed (including adjusting bedclothes, sheets and blankets)?: 4  Sitting down on and standing up from a chair with arms (e.g., wheelchair, bedside commode, etc.): 3  Moving from lying on back to sitting on the side of the bed?: 4  Moving to and from a bed to a chair (including a wheelchair)?: 3  Need to walk in hospital room?: 3  Climbing 3-5 steps with a railing?: 3  Basic Mobility Total Score: 20       Therapeutic Activities and Exercises:  PT assisted pt with donning/doffing diaper.     Patient left supine with all lines intact, call button in reach and family present..    GOALS:   Multidisciplinary Problems     Physical Therapy Goals        Problem: Physical Therapy Goal    Goal Priority Disciplines Outcome Goal Variances Interventions   Physical Therapy Goal     PT, PT/OT Ongoing (interventions implemented as appropriate)     Description:  Goals to be met by: 18     Patient will increase functional independence with mobility by performin. Sit to stand transfer with Standby Assistance  2. Gait  x 50 feet with Contact Guard Assistance using Rolling Walker.- MET  Revised- Gait x 125 ft with SBA using RW.    3. Ascend/descend 6 stair with bilateral Handrails Minimal Assistance using no AD.   4. Lower extremity exercise program x15  reps per handout, with supervision                        Time Tracking:     PT Received On: 11/01/18  PT Start Time: 1430     PT Stop Time: 1502  PT Total Time (min): 32 min     Billable Minutes: Gait Training 20 min and Therapeutic Activity 12 min    Treatment Type: Treatment  PT/PTA: PT           Clare Alvarado, SPT  11/01/2018

## 2018-11-01 NOTE — PROGRESS NOTES
[facility] Hospital Medicine  Progress Note    Patient Name: Lorenzo Tran Sr.  MRN: [id number]  Patient Class: IP- Inpatient   Admission Date: 10/23/2018  Length of Stay: 9 days  Attending Physician: Vickie Anderson MD  Primary Care Provider: Padmini Dailey [facility] Medicine Team: [facility] MED 1 Tawnya Corbin MD    Subjective:     Principal Problem:Brain metastasis    HPI:  68 yo M w/ PMH of HTN, CAD s/p CABG, carotid artery disease s/p stenting, and elevated PSA who presents as transfer for evaluation of brain lesion. Patient was brought to [facility] ED by his family w/ concern of altered mental status and generalized weakness. Family was concerned that he was not acting like himself. Patient shares that yesterday he attempted to take a bath and was unable to lift himself out of the tub. He waited 4 hours until a family member called EMS. He endorses chronic RLE weakness secondary to work related incident approximately 30 years ago. He also endorses new RUE weakness. On evaluation in the ED, he was noted to have very mild R facial droop. CT head demonstrated vasogenic edema w/ 1 cm nodular lesion in the L frontal lobe. CXR was significant for mass like consolidations in the L lung. Complains of chronic cough w/ occasional sputum. Shares that he had 1 episode of hemoptysis 2-3 months ago. Patient has no previous lung history but endorses a long history of smoking cigars. Reports that he would smoke a pack of cigars a day. In the ED prior to transfer he received decadron 10mg. No seizure like activity noted and no previous seizure history. Reports shortness of breath and urinary symptoms. Denies fever, chills, chest pain, and abdominal pain.       Hospital Course:  10/25 underwent bronchoscopy, biopsy was taken from the his lung mass, MRI brain showed frontal mass with vasogenic edema, abdominal CT showed multiple liver mass concerning for met, neurosurgery consulted will  involve hem/och tomorrow.   10/26 U/S testicles showed right sided hydrocele,will follow with urology out patient, biopsy still in process, no acute interventions by neurosurgery. hem/onc following and developing treatment plan.  10/27-28-29 Pt is stable, except his BP in which we added amlodipine for a better control.  10/30 Plan for rehab placement is pending. Pt will F/U with hem/onc @ [facility]. In regards to radiation therapy for his brain lesion, Neurosurgery recommended [place] [facility] [place].  10/31 patient denied multiple rehabs, SW/CM working on placement issues.     No changes, pending placement to rehab.    Review of Systems   Constitutional: Positive for activity change. Negative for appetite change, chills, diaphoresis, fatigue and fever.   HENT: Negative for rhinorrhea and sore throat.    Eyes: Negative for photophobia and visual disturbance.   Respiratory: Negative for cough, chest tightness, shortness of breath and wheezing.    Cardiovascular: Negative for chest pain, palpitations and leg swelling.   Gastrointestinal: Negative for abdominal distention, abdominal pain, blood in stool, constipation, diarrhea, nausea and vomiting.   Genitourinary: Positive for scrotal swelling (Varicocele ). Negative for flank pain, frequency and hematuria.   Skin: Negative for rash and wound.   Neurological: Positive for weakness. Negative for dizziness, tremors, seizures, speech difficulty, light-headedness, numbness and headaches.   Hematological: Does not bruise/bleed easily.   Psychiatric/Behavioral: Positive for sleep disturbance. Negative for agitation and confusion.     Objective:     Vital Signs (Most Recent):  Temp: 98 °F (36.7 °C) (11/01/18 0840)  Pulse: (!) 58 (11/01/18 1009)  Resp: 16 (11/01/18 1009)  BP: (!) 167/78(Dr Anderson at bedside and is aware of pts BP) (11/01/18 0840)  SpO2: 98 % (11/01/18 0840) Vital Signs (24h Range):  Temp:  [97.1 °F (36.2 °C)-99.2 °F (37.3 °C)] 98 °F (36.7  °C)  Pulse:  [52-89] 58  Resp:  [15-20] 16  SpO2:  [93 %-98 %] 98 %  BP: (113-167)/(56-80) 167/78     Weight: 74.8 kg (165 lb 0 oz)  Body mass index is 25.84 kg/m².    Intake/Output Summary (Last 24 hours) at 11/1/2018 1020  Last data filed at 11/1/2018 0940  Gross per 24 hour   Intake 720 ml   Output --   Net 720 ml      Physical Exam   Constitutional: He is oriented to person, place, and time. He appears well-developed and well-nourished. No distress.   HENT:   Head: Normocephalic.   Eyes: No scleral icterus.   Cardiovascular: Normal rate, regular rhythm and normal heart sounds.   No murmur heard.  Pulmonary/Chest: Effort normal. No respiratory distress. He exhibits no tenderness.   Abdominal: Soft. Bowel sounds are normal. He exhibits no distension. There is no tenderness.   Musculoskeletal: Normal range of motion. He exhibits no edema or tenderness.   Neurological: He is alert and oriented to person, place, and time.   Skin: He is not diaphoretic.       Significant Labs: BMP: No results for input(s): GLU, NA, K, CL, CO2, BUN, CREATININE, CALCIUM, MG in the last 48 hours.      Assessment/Plan:      * Brain metastasis    Lung mets to brain which causing Vasogenic cerebral edema  68 yo M w/ PMH of HTN, CAD s/p CABG, carotid artery disease s/p stenting, and elevated PSA who presents as transfer for evaluation of brain lesion w/ newly found mass-like consolidations on CXR. Concern for primary lung lesion w/ significant smoking history and brain metastasis w/ vasogenic edema. Mild expressive aphasia, R>L weakness, difficulty w/ mobility. Hypertensive concerning for Cushing reflex secondary to cerebral edema. HR wnl.   - IV decadron 4mg q6. Pt has mild increase in WBC mostly due to white shift from steroid usage.   - keppra 500 mg BID  - CT Neck/chest/abdomen/pelvis w/ contrast showed b/l lung mass, liver masses and  MRI brain showed frontal brain mass  - underwent bronch biopsy showed non-small cell lung ca  -  neurosurgery following appreciate their recs, they F/U with as an outpatient, and no procedures to be done other than XRT treatment   - hem/onc updated us today that the Pt will FU with Hem/Onc @ [facility]. Next appointment is next Tuesday 11/6/2018.    Neurosurgery latest Recs:  - Continue dex for cerebral edema. Currently on 4 mg q 6 hours. Recommend 2 week taper down to 2 mg BID  - Continue Keppra 500mg for seizure prophylaxis   - Maintain SBP <160     Hyponatremia    Levels are mildly low  - Most likely due to SIADH  Not an active problem      Discharge planning issues    Pending rehab placement      Testicle swelling    Unsure duration  - patient reported it was noticed by his PCP several month ago  - denies pain, reported difficulty urination, dysuria and dripping   - right testicular swelling, soft,non tender. Soft non tender prostate on exam.  - continue Flomax and finasteride  - U/s testicles consistent with right testicles hydrocele   - urology appointment upon discharge      Lab Results   Component Value Date    PSA 2.2 10/25/2018    PSA 6.9 (H) 05/18/2017     Not an active problem          Vasogenic cerebral edema    Check brain mets       Lung mass    - Biopsy showed non-small cell lung ca, and cytology has similar results     Plan:  - Rehab placement is still pending.  - Pt will FU with Hem/Onc @ [facility]. Next appointment is next Tuesday 11/6/2018.     Chest pain    - Described as sharp, radiating from his R chest across to the L, non-reproducible  - Reports that this is a chronic occurrence, comes and goes   - Obtained EKG, negative for STEMI;   - resumed aspirin 81 mg    Problem resolved, Pt has PRN pain meds      Essential hypertension    - stable  - possibly secondary to cushing reflex, will continue to monitor  - Added In the hospital amlodipine 5 mg. @ home he takes coreg      MCCRAY (dyspnea on exertion)    - Chronic likely secondary to COPD given CXR findings and smoking history  - Not on O2 at home  -  duonebs PRN  - On tiotropium daily  Not an active problem        Elevated PSA    - hx of elevated PSA  - nursing reports of urinary incontinence, dribbling, weak stream  - resume finasteride, tamsulosin  Not an active problem      Coronary artery disease involving native heart    On Asprin and atorvastatin   Not an active problem      Tobacco abuse    - acknowledges need to quit  - nicotine patch.          VTE Risk Mitigation (From admission, onward)        Ordered     heparin (porcine) injection 5,000 Units  Every 8 hours      10/26/18 0915     IP VTE HIGH RISK PATIENT  Once      10/24/18 1730              Tawnya Corbin MD  Department of Hospital Medicine   [facility]

## 2018-11-02 ENCOUNTER — TELEPHONE (OUTPATIENT)
Dept: NEUROSURGERY | Facility: CLINIC | Age: 68
End: 2018-11-02

## 2018-11-02 LAB
ALBUMIN SERPL BCP-MCNC: 2.7 G/DL
ALP SERPL-CCNC: 82 U/L
ALT SERPL W/O P-5'-P-CCNC: 25 U/L
ANION GAP SERPL CALC-SCNC: 7 MMOL/L
AST SERPL-CCNC: 8 U/L
BASOPHILS # BLD AUTO: 0.04 K/UL
BASOPHILS NFR BLD: 0.2 %
BILIRUB SERPL-MCNC: 0.3 MG/DL
BUN SERPL-MCNC: 27 MG/DL
CALCIUM SERPL-MCNC: 9 MG/DL
CHLORIDE SERPL-SCNC: 101 MMOL/L
CO2 SERPL-SCNC: 23 MMOL/L
CREAT SERPL-MCNC: 0.9 MG/DL
DIFFERENTIAL METHOD: ABNORMAL
EOSINOPHIL # BLD AUTO: 0.3 K/UL
EOSINOPHIL NFR BLD: 1.5 %
ERYTHROCYTE [DISTWIDTH] IN BLOOD BY AUTOMATED COUNT: 15.9 %
EST. GFR  (AFRICAN AMERICAN): >60 ML/MIN/1.73 M^2
EST. GFR  (NON AFRICAN AMERICAN): >60 ML/MIN/1.73 M^2
GLUCOSE SERPL-MCNC: 126 MG/DL
HCT VFR BLD AUTO: 43.8 %
HGB BLD-MCNC: 14.2 G/DL
IMM GRANULOCYTES # BLD AUTO: 0.7 K/UL
IMM GRANULOCYTES NFR BLD AUTO: 3.6 %
LYMPHOCYTES # BLD AUTO: 1.6 K/UL
LYMPHOCYTES NFR BLD: 8.3 %
MAGNESIUM SERPL-MCNC: 2.4 MG/DL
MCH RBC QN AUTO: 27.2 PG
MCHC RBC AUTO-ENTMCNC: 32.4 G/DL
MCV RBC AUTO: 84 FL
MONOCYTES # BLD AUTO: 1.1 K/UL
MONOCYTES NFR BLD: 5.5 %
NEUTROPHILS # BLD AUTO: 15.7 K/UL
NEUTROPHILS NFR BLD: 80.9 %
NRBC BLD-RTO: 0 /100 WBC
PHOSPHATE SERPL-MCNC: 3.1 MG/DL
PLATELET # BLD AUTO: 230 K/UL
PMV BLD AUTO: 10.4 FL
POCT GLUCOSE: 172 MG/DL (ref 70–110)
POCT GLUCOSE: 190 MG/DL (ref 70–110)
POCT GLUCOSE: 204 MG/DL (ref 70–110)
POCT GLUCOSE: 236 MG/DL (ref 70–110)
POTASSIUM SERPL-SCNC: 4.5 MMOL/L
PROT SERPL-MCNC: 6.4 G/DL
RBC # BLD AUTO: 5.22 M/UL
SODIUM SERPL-SCNC: 131 MMOL/L
WBC # BLD AUTO: 19.35 K/UL

## 2018-11-02 PROCEDURE — 11000001 HC ACUTE MED/SURG PRIVATE ROOM

## 2018-11-02 PROCEDURE — 83735 ASSAY OF MAGNESIUM: CPT

## 2018-11-02 PROCEDURE — 36415 COLL VENOUS BLD VENIPUNCTURE: CPT

## 2018-11-02 PROCEDURE — 97802 MEDICAL NUTRITION INDIV IN: CPT

## 2018-11-02 PROCEDURE — 25000003 PHARM REV CODE 250: Performed by: STUDENT IN AN ORGANIZED HEALTH CARE EDUCATION/TRAINING PROGRAM

## 2018-11-02 PROCEDURE — 99231 SBSQ HOSP IP/OBS SF/LOW 25: CPT | Mod: ,,, | Performed by: HOSPITALIST

## 2018-11-02 PROCEDURE — 85025 COMPLETE CBC W/AUTO DIFF WBC: CPT

## 2018-11-02 PROCEDURE — 25000242 PHARM REV CODE 250 ALT 637 W/ HCPCS: Performed by: STUDENT IN AN ORGANIZED HEALTH CARE EDUCATION/TRAINING PROGRAM

## 2018-11-02 PROCEDURE — 80053 COMPREHEN METABOLIC PANEL: CPT

## 2018-11-02 PROCEDURE — 63600175 PHARM REV CODE 636 W HCPCS: Performed by: STUDENT IN AN ORGANIZED HEALTH CARE EDUCATION/TRAINING PROGRAM

## 2018-11-02 PROCEDURE — 84100 ASSAY OF PHOSPHORUS: CPT

## 2018-11-02 PROCEDURE — S4991 NICOTINE PATCH NONLEGEND: HCPCS | Performed by: STUDENT IN AN ORGANIZED HEALTH CARE EDUCATION/TRAINING PROGRAM

## 2018-11-02 RX ORDER — INSULIN LISPRO 100 [IU]/ML
INJECTION, SOLUTION INTRAVENOUS; SUBCUTANEOUS
Qty: 15 ML | Refills: 0 | Status: SHIPPED | OUTPATIENT
Start: 2018-11-02

## 2018-11-02 RX ORDER — AMOXICILLIN 250 MG
1 CAPSULE ORAL DAILY
Status: DISCONTINUED | OUTPATIENT
Start: 2018-11-03 | End: 2018-11-04 | Stop reason: HOSPADM

## 2018-11-02 RX ADMIN — GABAPENTIN 300 MG: 300 CAPSULE ORAL at 04:11

## 2018-11-02 RX ADMIN — DEXAMETHASONE 4 MG: 4 TABLET ORAL at 09:11

## 2018-11-02 RX ADMIN — LEVETIRACETAM 500 MG: 500 TABLET ORAL at 09:11

## 2018-11-02 RX ADMIN — TRAZODONE HYDROCHLORIDE 25 MG: 50 TABLET ORAL at 09:11

## 2018-11-02 RX ADMIN — HEPARIN SODIUM 5000 UNITS: 5000 INJECTION, SOLUTION INTRAVENOUS; SUBCUTANEOUS at 05:11

## 2018-11-02 RX ADMIN — INSULIN ASPART 2 UNITS: 100 INJECTION, SOLUTION INTRAVENOUS; SUBCUTANEOUS at 09:11

## 2018-11-02 RX ADMIN — HYDROCODONE BITARTRATE AND ACETAMINOPHEN 1 TABLET: 5; 325 TABLET ORAL at 09:11

## 2018-11-02 RX ADMIN — HEPARIN SODIUM 5000 UNITS: 5000 INJECTION, SOLUTION INTRAVENOUS; SUBCUTANEOUS at 01:11

## 2018-11-02 RX ADMIN — HEPARIN SODIUM 5000 UNITS: 5000 INJECTION, SOLUTION INTRAVENOUS; SUBCUTANEOUS at 09:11

## 2018-11-02 RX ADMIN — HYDROCODONE BITARTRATE AND ACETAMINOPHEN 1 TABLET: 5; 325 TABLET ORAL at 10:11

## 2018-11-02 RX ADMIN — INSULIN ASPART 2 UNITS: 100 INJECTION, SOLUTION INTRAVENOUS; SUBCUTANEOUS at 01:11

## 2018-11-02 RX ADMIN — NICOTINE 1 PATCH: 14 PATCH, EXTENDED RELEASE TRANSDERMAL at 09:11

## 2018-11-02 RX ADMIN — PANTOPRAZOLE SODIUM 40 MG: 40 TABLET, DELAYED RELEASE ORAL at 09:11

## 2018-11-02 RX ADMIN — FINASTERIDE 5 MG: 5 TABLET, FILM COATED ORAL at 09:11

## 2018-11-02 RX ADMIN — GABAPENTIN 300 MG: 300 CAPSULE ORAL at 09:11

## 2018-11-02 RX ADMIN — DEXAMETHASONE 4 MG: 4 TABLET ORAL at 01:11

## 2018-11-02 RX ADMIN — TAMSULOSIN HYDROCHLORIDE 0.4 MG: 0.4 CAPSULE ORAL at 09:11

## 2018-11-02 RX ADMIN — AMLODIPINE BESYLATE 5 MG: 5 TABLET ORAL at 09:11

## 2018-11-02 RX ADMIN — ASPIRIN 81 MG: 81 TABLET, COATED ORAL at 09:11

## 2018-11-02 RX ADMIN — ATORVASTATIN CALCIUM 80 MG: 20 TABLET, FILM COATED ORAL at 09:11

## 2018-11-02 RX ADMIN — TIOTROPIUM BROMIDE 18 MCG: 18 CAPSULE ORAL; RESPIRATORY (INHALATION) at 09:11

## 2018-11-02 RX ADMIN — DEXAMETHASONE 4 MG: 4 TABLET ORAL at 05:11

## 2018-11-02 NOTE — PROGRESS NOTES
" [facility]  Adult Nutrition  Progress Note    SUMMARY       Recommendations    Recommendation/Intervention: Cont w/ mech soft diet. Encourage po intake > 75%. 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 Po intake 100% medically stable awaiting acceptance to residential care. spoke w. fm at bedside state no nutritional needs at this time. pt wt stable. no signs of malnutrition    Nutrition Risk Screen    Nutrition Risk Screen: (P) no indicators present    Nutrition/Diet History    Patient Reported Diet/Restrictions/Preferences: soft  Do you have any cultural, spiritual, Buddhism conflicts, given your current situation?: none reported  Food Allergies: NKFA  Factors Affecting Nutritional Intake: None identified at this time    Anthropometrics    Temp: 97.3 °F (36.3 °C)  Height Method: Measured  Height: 5' 7" (170.2 cm)  Height (inches): 67 in  Weight Method: Bed Scale  Weight: 74.8 kg (165 lb 0 oz)  Weight (lb): 165 lb  Ideal Body Weight (IBW), Male: 148 lb  % Ideal Body Weight, Male (lb): 111.49 lb  BMI (Calculated): 25.9  BMI Grade: 25 - 29.9 - overweight       Lab/Procedures/Meds    Pertinent Labs Reviewed: reviewed  Pertinent Labs Comments: Na-131, Glu-126, BUN--26  Pertinent Medications Reviewed: reviewed  Pertinent Medications Comments: statin, dexamethasone, insulin, heparin    Physical Findings/Assessment    Overall Physical Appearance: advanced age, weak  Oral/Mouth Cavity: WDL  Skin: intact    Estimated/Assessed Needs                            CHO Requirement: 50% total intake      Nutrition Prescription Ordered    Current Diet Order: [facility] soft    Evaluation of Received Nutrient/Fluid " Intake    Comments: LBM: 10/31/18  % Intake of Estimated Energy Needs: 75 - 100 %  % Meal Intake: 75 - 100 %    Nutrition Risk    Level of Risk/Frequency of Follow-up: low     Assessment and Plan    No nutritional dx at this time    Monitor and Evaluation    Food and Nutrient Intake: energy intake, food and beverage intake  Food and Nutrient Adminstration: diet order  Anthropometric Measurements: weight, weight change  Biochemical Data, Medical Tests and Procedures: (all labs)  Nutrition-Focused Physical Findings: overall appearance     Nutrition Follow-Up    RD Follow-up?: Yes

## 2018-11-02 NOTE — PLAN OF CARE
Problem: Patient Care Overview  Goal: Plan of Care Review  Outcome: Ongoing (interventions implemented as appropriate)  POC reviewed with patient and patient's sister. Both verbalize understanding. Patient up to chair to eat breakfast and lunch with 1-person assist. Sister @ bedside. Safety precautions in place; call light within reach, bed in low position, wheels locked, side rails up x2. Will continue to monitor.     Problem: Fall Risk (Adult)  Goal: Identify Related Risk Factors and Signs and Symptoms  Related risk factors and signs and symptoms are identified upon initiation of Human Response Clinical Practice Guideline (CPG)  Outcome: Ongoing (interventions implemented as appropriate)   11/02/18 1529   Fall Risk   Related Risk Factors (Fall Risk) age-related changes;confusion/agitation;impaired vision;polypharmacy       Problem: Pressure Ulcer Risk (Avel Scale) (Adult,Obstetrics,Pediatric)  Goal: Identify Related Risk Factors and Signs and Symptoms  Related risk factors and signs and symptoms are identified upon initiation of Human Response Clinical Practice Guideline (CPG)  Outcome: Ongoing (interventions implemented as appropriate)   11/02/18 1529   Pressure Ulcer Risk (Avel Scale)   Related Risk Factors (Pressure Ulcer Risk (Avel Scale)) cognitive impairment;medication

## 2018-11-02 NOTE — PLAN OF CARE
Problem: Patient Care Overview  Goal: Plan of Care Review    Recommendations     Recommendation/Intervention: Cont w/ mech soft diet. Encourage po intake > 75%. 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 Po intake 100% medically stable awaiting acceptance to snf care. spoke w. fm at bedside state no nutritional needs at this time. pt wt stable. no signs of malnutrition

## 2018-11-02 NOTE — ASSESSMENT & PLAN NOTE
- Possibly secondary to cushing reflex, will continue to monitor  - Home he takes coreg. Home med are D/C  - In the hospital on amlodipine and hydralazine .

## 2018-11-02 NOTE — PLAN OF CARE
Problem: Fall Risk (Adult)  Goal: Identify Related Risk Factors and Signs and Symptoms  Related risk factors and signs and symptoms are identified upon initiation of Human Response Clinical Practice Guideline (CPG)  Outcome: Ongoing (interventions implemented as appropriate)   11/01/18 1740   Fall Risk   Related Risk Factors (Fall Risk) age-related changes;bladder function altered;confusion/agitation;fatigue/slow reaction;polypharmacy;neuro disease/injury;sensory deficits;environment unfamiliar   Signs and Symptoms (Fall Risk) presence of risk factors     Goal: Absence of Falls  Patient will demonstrate the desired outcomes by discharge/transition of care.  Outcome: Ongoing (interventions implemented as appropriate)   11/01/18 1740   Fall Risk (Adult)   Absence of Falls making progress toward outcome

## 2018-11-02 NOTE — ASSESSMENT & PLAN NOTE
- Described as sharp, radiating from his R chest across to the L, non-reproducible  - Reports that this is a chronic occurrence, comes and goes   - Obtained EKG, negative for STEMI;     Problem resolved, Pt has PRN pain meds

## 2018-11-02 NOTE — SUBJECTIVE & OBJECTIVE
Review of Systems   Constitutional: Negative for appetite change, chills, diaphoresis, fatigue and fever.   HENT: Negative for rhinorrhea and sore throat.    Eyes: Negative for visual disturbance.   Respiratory: Negative for apnea, cough, shortness of breath and stridor.    Cardiovascular: Negative for chest pain, palpitations and leg swelling.   Gastrointestinal: Negative for abdominal distention, abdominal pain, constipation, diarrhea, nausea and vomiting.   Genitourinary: Positive for testicular pain (Rt sided varicocele). Negative for flank pain and hematuria.   Skin: Negative for rash and wound.   Neurological: Negative for tremors, seizures, light-headedness and headaches.   Psychiatric/Behavioral: Negative for agitation and confusion.     Objective:     Vital Signs (Most Recent):  Temp: 98.2 °F (36.8 °C) (11/02/18 0744)  Pulse: (!) 59 (11/02/18 0925)  Resp: 18 (11/02/18 0925)  BP: 128/71 (11/02/18 0744)  SpO2: 97 % (11/02/18 0744) Vital Signs (24h Range):  Temp:  [96.2 °F (35.7 °C)-98.4 °F (36.9 °C)] 98.2 °F (36.8 °C)  Pulse:  [53-79] 59  Resp:  [16-18] 18  SpO2:  [94 %-97 %] 97 %  BP: (102-148)/(54-72) 128/71     Weight: 74.8 kg (165 lb 0 oz)  Body mass index is 25.84 kg/m².    Intake/Output Summary (Last 24 hours) at 11/2/2018 1000  Last data filed at 11/2/2018 0500  Gross per 24 hour   Intake 720 ml   Output --   Net 720 ml      Physical Exam   Constitutional: He is oriented to person, place, and time. He appears well-developed and well-nourished. No distress.   HENT:   Head: Normocephalic.   Cardiovascular: Normal rate, regular rhythm and normal heart sounds.   No murmur heard.  Pulmonary/Chest: Effort normal and breath sounds normal. He has no wheezes. He exhibits no tenderness.   Abdominal: Soft. Bowel sounds are normal. He exhibits no distension. There is no tenderness.   Musculoskeletal: Normal range of motion. He exhibits no edema, tenderness or deformity.   Neurological: He is alert and oriented to  person, place, and time.   Skin: He is not diaphoretic.       Significant Labs:   BMP:   Recent Labs   Lab 11/02/18  0514   *   *   K 4.5      CO2 23   BUN 27*   CREATININE 0.9   CALCIUM 9.0   MG 2.4     CBC:   Recent Labs   Lab 11/02/18  0514   WBC 19.35*   HGB 14.2   HCT 43.8        CMP:   Recent Labs   Lab 11/02/18  0514   *   K 4.5      CO2 23   *   BUN 27*   CREATININE 0.9   CALCIUM 9.0   PROT 6.4   ALBUMIN 2.7*   BILITOT 0.3   ALKPHOS 82   AST 8*   ALT 25   ANIONGAP 7*   EGFRNONAA >60.0

## 2018-11-02 NOTE — PROGRESS NOTES
[facility] Hospital Medicine  Progress Note    Patient Name: Lorenzo Tran Sr.  MRN: [id number]  Patient Class: IP- Inpatient   Admission Date: 10/23/2018  Length of Stay: 10 days  Attending Physician: Vickie Anderson MD  Primary Care Provider: Padmini Dailey [facility] Medicine Team: [facility] Tawnya Corbin MD    Subjective:     Principal Problem:Brain metastasis    HPI:  68 yo M w/ PMH of HTN, CAD s/p CABG, carotid artery disease s/p stenting, and elevated PSA who presents as transfer for evaluation of brain lesion. Patient was brought to [facility] ED by his family w/ concern of altered mental status and generalized weakness. Family was concerned that he was not acting like himself. Patient shares that yesterday he attempted to take a bath and was unable to lift himself out of the tub. He waited 4 hours until a family member called EMS. He endorses chronic RLE weakness secondary to work related incident approximately 30 years ago. He also endorses new RUE weakness. On evaluation in the ED, he was noted to have very mild R facial droop. CT head demonstrated vasogenic edema w/ 1 cm nodular lesion in the L frontal lobe. CXR was significant for mass like consolidations in the L lung. Complains of chronic cough w/ occasional sputum. Shares that he had 1 episode of hemoptysis 2-3 months ago. Patient has no previous lung history but endorses a long history of smoking cigars. Reports that he would smoke a pack of cigars a day. In the ED prior to transfer he received decadron 10mg. No seizure like activity noted and no previous seizure history. Reports shortness of breath and urinary symptoms. Denies fever, chills, chest pain, and abdominal pain.       Hospital Course:  10/25 underwent bronchoscopy, biopsy was taken from the his lung mass, MRI brain showed frontal mass with vasogenic edema, abdominal CT showed multiple liver mass concerning for met, neurosurgery consulted will  involve hem/och tomorrow.   10/26 U/S testicles showed right sided hydrocele,will follow with urology out patient, biopsy still in process, no acute interventions by neurosurgery. hem/onc following and developing treatment plan.  10/27-28-29 Pt is stable, except his BP in which we added amlodipine for a better control.  10/30 Plan for rehab placement is pending. Pt will F/U with hem/onc [facility] [place]. In regards to radiation therapy for his brain lesion, Neurosurgery recommended to perform the procedure in [facility] as an outpatient.  10/31 patient denied multiple rehabs, SW/CM working on placement issues.     No changes, pending placement to rehab.    Review of Systems   Constitutional: Negative for appetite change, chills, diaphoresis, fatigue and fever.   HENT: Negative for rhinorrhea and sore throat.    Eyes: Negative for visual disturbance.   Respiratory: Negative for apnea, cough, shortness of breath and stridor.    Cardiovascular: Negative for chest pain, palpitations and leg swelling.   Gastrointestinal: Negative for abdominal distention, abdominal pain, constipation, diarrhea, nausea and vomiting.   Genitourinary: Positive for testicular pain (Rt sided varicocele). Negative for flank pain and hematuria.   Skin: Negative for rash and wound.   Neurological: Negative for tremors, seizures, light-headedness and headaches.   Psychiatric/Behavioral: Negative for agitation and confusion.     Objective:     Vital Signs (Most Recent):  Temp: 98.2 °F (36.8 °C) (11/02/18 0744)  Pulse: (!) 59 (11/02/18 0925)  Resp: 18 (11/02/18 0925)  BP: 128/71 (11/02/18 0744)  SpO2: 97 % (11/02/18 0744) Vital Signs (24h Range):  Temp:  [96.2 °F (35.7 °C)-98.4 °F (36.9 °C)] 98.2 °F (36.8 °C)  Pulse:  [53-79] 59  Resp:  [16-18] 18  SpO2:  [94 %-97 %] 97 %  BP: (102-148)/(54-72) 128/71     Weight: 74.8 kg (165 lb 0 oz)  Body mass index is 25.84 kg/m².    Intake/Output Summary (Last 24 hours) at 11/2/2018 1000  Last data filed at 11/2/2018  0500  Gross per 24 hour   Intake 720 ml   Output --   Net 720 ml      Physical Exam   Constitutional: He is oriented to person, place, and time. He appears well-developed and well-nourished. No distress.   HENT:   Head: Normocephalic.   Cardiovascular: Normal rate, regular rhythm and normal heart sounds.   No murmur heard.  Pulmonary/Chest: Effort normal and breath sounds normal. He has no wheezes. He exhibits no tenderness.   Abdominal: Soft. Bowel sounds are normal. He exhibits no distension. There is no tenderness.   Musculoskeletal: Normal range of motion. He exhibits no edema, tenderness or deformity.   Neurological: He is alert and oriented to person, place, and time.   Skin: He is not diaphoretic.       Significant Labs:   BMP:   Recent Labs   Lab 11/02/18  0514   *   *   K 4.5      CO2 23   BUN 27*   CREATININE 0.9   CALCIUM 9.0   MG 2.4     CBC:   Recent Labs   Lab 11/02/18  0514   WBC 19.35*   HGB 14.2   HCT 43.8        CMP:   Recent Labs   Lab 11/02/18  0514   *   K 4.5      CO2 23   *   BUN 27*   CREATININE 0.9   CALCIUM 9.0   PROT 6.4   ALBUMIN 2.7*   BILITOT 0.3   ALKPHOS 82   AST 8*   ALT 25   ANIONGAP 7*   EGFRNONAA >60.0     Assessment/Plan:      * Brain metastasis    66 yo M w/ PMH of HTN, CAD s/p CABG, carotid artery disease s/p stenting, and elevated PSA. He is recently diagnosed with non-small cell lung ca based on lung biopsy bronchoscopy, with mets to brain and liver. CT Neck/chest/abdomen/pelvis w/ contrast showed b/l lung mass, liver masses and  MRI brain showed frontal brain mass. underwent bronch biopsy showed non-small cell lung ca, cytology has similar results    - On PO decadron 4mg q8 that will be tapered to 2 mg. Was on IV  - keppra 500 mg BID  - Neurosurgery recs, they F/U with as an outpatient, and no procedures to be done other than XRT treatment   - hem/onc updated us today that the Pt will FU with Hem/Onc @ [facility]. Next appointment is  next Tuesday 11/6/2018.       Hyponatremia    Levels are mildly low. Most likely due to SIADH. Not an active problem      Discharge planning issues    Pending rehab placement      Testicle swelling    Unsure duration  - patient reported it was noticed by his PCP several month ago  - denies pain, reported difficulty urination, dysuria and dripping   - right testicular swelling, soft,non tender. Soft non tender prostate on exam.  - continue Flomax and finasteride  - U/s testicles consistent with right testicles hydrocele   - Urology appointment upon discharge           Vasogenic cerebral edema    Check brain mets       Lung mass    Check brain mets     Chest pain    - Described as sharp, radiating from his R chest across to the L, non-reproducible  - Reports that this is a chronic occurrence, comes and goes   - Obtained EKG, negative for STEMI;     Problem resolved, Pt has PRN pain meds      Essential hypertension    - Possibly secondary to cushing reflex, will continue to monitor  - Home he takes coreg. Home med are D/C  - In the hospital on amlodipine and hydralazine .      MCCRAY (dyspnea on exertion)    - Chronic likely secondary to COPD given CXR findings and smoking history. Not on O2 at home  - On duonebs PRN, tiotropium daily    Not an active problem        Elevated PSA    - hx of elevated PSA  - nursing reports of urinary incontinence, dribbling, weak stream  - resume finasteride, tamsulosin  Not an active problem      Coronary artery disease involving native heart    On Asprin and atorvastatin. Not an active problem      Tobacco abuse    Acknowledges need to quit, and he is on nicotine patch.          VTE Risk Mitigation (From admission, onward)        Ordered     heparin (porcine) injection 5,000 Units  Every 8 hours      10/26/18 0980     IP VTE HIGH RISK PATIENT  Once      10/24/18 2649              Tawnya Corbin MD  Department of Hospital Medicine   [facility]

## 2018-11-02 NOTE — ASSESSMENT & PLAN NOTE
68 yo M w/ PMH of HTN, CAD s/p CABG, carotid artery disease s/p stenting, and elevated PSA. He is recently diagnosed with non-small cell lung ca based on lung biopsy bronchoscopy, with mets to brain and liver. CT Neck/chest/abdomen/pelvis w/ contrast showed b/l lung mass, liver masses and  MRI brain showed frontal brain mass. underwent bronch biopsy showed non-small cell lung ca, cytology has similar results    - On PO decadron 4mg q8 that will be tapered to 2 mg. Was on IV  - keppra 500 mg BID  - Neurosurgery recs, they F/U with as an outpatient, and no procedures to be done other than XRT treatment   - hem/onc updated us today that the Pt will FU with Hem/Onc @ [facility]. Next appointment is next Tuesday 11/6/2018.

## 2018-11-02 NOTE — ASSESSMENT & PLAN NOTE
Unsure duration  - patient reported it was noticed by his PCP several month ago  - denies pain, reported difficulty urination, dysuria and dripping   - right testicular swelling, soft,non tender. Soft non tender prostate on exam.  - continue Flomax and finasteride  - U/s testicles consistent with right testicles hydrocele   - Urology appointment upon discharge

## 2018-11-02 NOTE — TELEPHONE ENCOUNTER
Pt's dtr, Stephanie called. Asked about getting GSRS scheduled. Explained he had to be an outpatient and why. She understands and would like to proceed with scheduling him.    Contacted [facility]. Will do him Friday Nov 9th.    Called dtr back. Gave her date. She will proceed with getting father D/C'd.

## 2018-11-02 NOTE — PLAN OF CARE
"CM met with pt,  pt's sister who is an oncology nurse and Dr. Neal Enciso's nurse,  at the bedside.    Zaria, RN explained that she had been in contact with daughter Monet who was supposed to call her back once obtaining a POA and to have consents signed.     However, Nurse Enciso is still waiting for daughterMonet to call her back.      Nurse Enciso explained that pt would have to be discharged from the hospital to go to a non [facility] facility      [facility] in [place], [place]  Address: [address] [place] [address] [address], [place] [address]  Phone: [phone]    to get a one time treatment.    Pt's sister (also named Zaria)  will call pt's daughter, Monet , to update her    CM had provided a number to a mobile notary to Monet last week.  [phone] however, I explained this is non [facility] affiliated and there may be more notaries. Monet stated she's been busy and hasn't had time to do this.     11/02/18 1410   Right Care Assessment   Can the patient answer the patient profile reliably? Yes, cognitively intact     CM called DAUGHTER MONET DWYER [phone] to remind her to  Call Nurse Enciso.  Monet would like senior care placement so she will look into facilities but is leaning towards the radiosurgery first then senior care placement thereafter. She will discuss this with other family members.  Also, she mentioned that Nathalie is legally  to Mr. Tran but "only on paper."  "

## 2018-11-02 NOTE — ASSESSMENT & PLAN NOTE
- Chronic likely secondary to COPD given CXR findings and smoking history. Not on O2 at home  - On duonebs PRN, tiotropium daily    Not an active problem

## 2018-11-03 LAB
POCT GLUCOSE: 119 MG/DL (ref 70–110)
POCT GLUCOSE: 137 MG/DL (ref 70–110)
POCT GLUCOSE: 155 MG/DL (ref 70–110)
POCT GLUCOSE: 86 MG/DL (ref 70–110)

## 2018-11-03 PROCEDURE — 25000242 PHARM REV CODE 250 ALT 637 W/ HCPCS: Performed by: STUDENT IN AN ORGANIZED HEALTH CARE EDUCATION/TRAINING PROGRAM

## 2018-11-03 PROCEDURE — 25000003 PHARM REV CODE 250: Performed by: STUDENT IN AN ORGANIZED HEALTH CARE EDUCATION/TRAINING PROGRAM

## 2018-11-03 PROCEDURE — 11000001 HC ACUTE MED/SURG PRIVATE ROOM

## 2018-11-03 PROCEDURE — 63600175 PHARM REV CODE 636 W HCPCS: Performed by: STUDENT IN AN ORGANIZED HEALTH CARE EDUCATION/TRAINING PROGRAM

## 2018-11-03 PROCEDURE — S4991 NICOTINE PATCH NONLEGEND: HCPCS | Performed by: STUDENT IN AN ORGANIZED HEALTH CARE EDUCATION/TRAINING PROGRAM

## 2018-11-03 PROCEDURE — 99231 SBSQ HOSP IP/OBS SF/LOW 25: CPT | Mod: ,,, | Performed by: HOSPITALIST

## 2018-11-03 RX ORDER — GABAPENTIN 300 MG/1
300 CAPSULE ORAL 3 TIMES DAILY
Qty: 90 CAPSULE | Refills: 11 | Status: SHIPPED | OUTPATIENT
Start: 2018-11-03 | End: 2019-11-03

## 2018-11-03 RX ORDER — AMLODIPINE BESYLATE 5 MG/1
5 TABLET ORAL DAILY
Qty: 30 TABLET | Refills: 11 | Status: SHIPPED | OUTPATIENT
Start: 2018-11-04 | End: 2019-11-04

## 2018-11-03 RX ORDER — AMOXICILLIN 250 MG
1 CAPSULE ORAL DAILY
COMMUNITY
Start: 2018-11-04

## 2018-11-03 RX ORDER — TRAZODONE HYDROCHLORIDE 50 MG/1
25 TABLET ORAL NIGHTLY
Qty: 15 TABLET | Refills: 11 | Status: SHIPPED | OUTPATIENT
Start: 2018-11-03 | End: 2019-11-03

## 2018-11-03 RX ADMIN — HEPARIN SODIUM 5000 UNITS: 5000 INJECTION, SOLUTION INTRAVENOUS; SUBCUTANEOUS at 06:11

## 2018-11-03 RX ADMIN — SENNOSIDES AND DOCUSATE SODIUM 1 TABLET: 8.6; 5 TABLET ORAL at 09:11

## 2018-11-03 RX ADMIN — HYDROCODONE BITARTRATE AND ACETAMINOPHEN 1 TABLET: 5; 325 TABLET ORAL at 02:11

## 2018-11-03 RX ADMIN — RAMELTEON 8 MG: 8 TABLET, FILM COATED ORAL at 12:11

## 2018-11-03 RX ADMIN — GABAPENTIN 300 MG: 300 CAPSULE ORAL at 02:11

## 2018-11-03 RX ADMIN — AMLODIPINE BESYLATE 5 MG: 5 TABLET ORAL at 09:11

## 2018-11-03 RX ADMIN — GABAPENTIN 300 MG: 300 CAPSULE ORAL at 09:11

## 2018-11-03 RX ADMIN — ASPIRIN 81 MG: 81 TABLET, COATED ORAL at 09:11

## 2018-11-03 RX ADMIN — DEXAMETHASONE 4 MG: 4 TABLET ORAL at 06:11

## 2018-11-03 RX ADMIN — TIOTROPIUM BROMIDE 18 MCG: 18 CAPSULE ORAL; RESPIRATORY (INHALATION) at 09:11

## 2018-11-03 RX ADMIN — TAMSULOSIN HYDROCHLORIDE 0.4 MG: 0.4 CAPSULE ORAL at 09:11

## 2018-11-03 RX ADMIN — LEVETIRACETAM 500 MG: 500 TABLET ORAL at 09:11

## 2018-11-03 RX ADMIN — HEPARIN SODIUM 5000 UNITS: 5000 INJECTION, SOLUTION INTRAVENOUS; SUBCUTANEOUS at 09:11

## 2018-11-03 RX ADMIN — FINASTERIDE 5 MG: 5 TABLET, FILM COATED ORAL at 09:11

## 2018-11-03 RX ADMIN — ATORVASTATIN CALCIUM 80 MG: 20 TABLET, FILM COATED ORAL at 09:11

## 2018-11-03 RX ADMIN — TRAZODONE HYDROCHLORIDE 25 MG: 50 TABLET ORAL at 09:11

## 2018-11-03 RX ADMIN — HEPARIN SODIUM 5000 UNITS: 5000 INJECTION, SOLUTION INTRAVENOUS; SUBCUTANEOUS at 02:11

## 2018-11-03 RX ADMIN — DEXAMETHASONE 2 MG: 1 TABLET ORAL at 02:11

## 2018-11-03 RX ADMIN — HYDROCODONE BITARTRATE AND ACETAMINOPHEN 1 TABLET: 5; 325 TABLET ORAL at 06:11

## 2018-11-03 RX ADMIN — PANTOPRAZOLE SODIUM 40 MG: 40 TABLET, DELAYED RELEASE ORAL at 09:11

## 2018-11-03 RX ADMIN — NICOTINE 1 PATCH: 14 PATCH, EXTENDED RELEASE TRANSDERMAL at 09:11

## 2018-11-03 NOTE — NURSING
Pt stated that he was awake and unable to go back to sleep and wanted pain medications, Pt stated his pain was a 10/10 and would never be lower. Pt also stated that his sister had given him a couple of pills to help him sleep before she left. Pt informed that the next available pain med would be 5 am, I called [facility] and NNO placed, but instructed to give PRN remelteon, Provided medication as ordered. When I gave the medication pt stated he was going to watch television and go to sleep at 2am. Instructed pt the pt best works if he relaxes and lets the medication work.

## 2018-11-03 NOTE — PLAN OF CARE
[facility]    HOME HEALTH ORDERS  FACE TO FACE ENCOUNTER    Patient Name: Lorenzo Tran Sr.  YOB: 1950    PCP: JONATHAN Dutta   PCP Address: [address] / [place] [address]  PCP Phone Number: [phone]  PCP Fax: [phone]    Encounter Date: 11/03/2018    Admit to Home Health    Diagnoses:  Active Hospital Problems    Diagnosis  POA    *Brain metastasis [C79.31]  Yes    Impaired mobility and activities of daily living [Z74.09]  Unknown    Non-small cell carcinoma of lung [C34.90]  Unknown    Hyponatremia [E87.1]  Yes    Discharge planning issues [Z02.9]  Not Applicable    Testicle swelling [N50.89]  Yes    Lung mass [R91.8]  Yes    Vasogenic cerebral edema [G93.6]  Yes    Chest pain [R07.9]  Yes    Essential hypertension [I10]  Yes    MCCRAY (dyspnea on exertion) [R06.09]  Yes    Tobacco abuse [Z72.0]  Yes    Coronary artery disease involving native heart [I25.10]  Yes      Resolved Hospital Problems   No resolved problems to display.       Future Appointments   Date Time Provider Department Center   11/6/2018 10:00 AM Anup Romero MD [facility] [address]           I have seen and examined this patient face to face today. My clinical findings that support the need for the home health skilled services and home bound status are the following:  Weakness/numbness causing balance and gait disturbance due to Weakness/Debility and Malignancy/Cancer making it taxing to leave home.  Requiring assistive device to leave home due to unsteady gait caused by  Weakness/Debility and Malignancy/Cancer.    Allergies:Review of patient's allergies indicates:  No Known Allergies    Diet: cardiac diet    Activities: activity as tolerated    Nursing:   SN to complete comprehensive assessment including routine vital signs. Instruct on disease process and s/s of complications to report to MD. Review/verify medication list sent home with the patient at time of discharge   and instruct patient/caregiver as needed. Frequency may be adjusted depending on start of care date.    Notify MD if SBP > 160 or < 90; DBP > 90 or < 50; HR > 120 or < 50; Temp > 101; Other:      CONSULTS:    Physical Therapy to evaluate and treat. Evaluate for home safety and equipment needs; Establish/upgrade home exercise program. Perform / instruct on therapeutic exercises, gait training, transfer training, and Range of Motion.  Occupational Therapy to evaluate and treat. Evaluate home environment for safety and equipment needs. Perform/Instruct on transfers, ADL training, ROM, and therapeutic exercises.   to evaluate for community resources/long-range planning.  Please assist patient in finding SNF/Rehab placement if needed after surgery.  Aide to provide assistance with personal care, ADLs, and vital signs.     MISCELLANEOUS CARE:  Diabetic Care:   Fingerstick blood sugar a.m. and p.m.  Patient is not diabetic but he is in high does steroid for his brain mets and has been requiring ISS during his hospitalization          Medications: Review discharge medications with patient and family and provide education.           Medications: Review discharge medications with patient and family and provide education.      Current Discharge Medication List      START taking these medications    Details   amLODIPine (NORVASC) 5 MG tablet Take 1 tablet (5 mg total) by mouth once daily.  Qty: 30 tablet, Refills: 11      aspirin (ECOTRIN) 81 MG EC tablet Take 1 tablet (81 mg total) by mouth once daily.  Refills: 0      blood sugar diagnostic Strp Use as directed to test blood glucose 3-4 times daily  Qty: 100 each, Refills: 3      blood-glucose meter kit Use as directed to test blood glucose 3-4 times daily  Qty: 1 each, Refills: 0      dexamethasone (DECADRON) 2 MG Tab Perform the following steroid taper:  Dex 2mg PO q6h 11/3/18-11/6/18  Dex 2mg PO q8h 11/7/18-11/10/18  Dex 2mg PO q12h 11/11/18  Qty:  120,  "Refills 0      gabapentin (NEURONTIN) 300 MG capsule Take 1 capsule (300 mg total) by mouth 3 (three) times daily.  Qty: 90 capsule, Refills: 11      insulin lispro (HUMALOG KWIKPEN) 100 unit/mL InPn pen Inject into the skin 3 times daily with meals per sliding scale. (1 unit 150-200; 2 units 201-250; 3 units 251-300; 4 units 301-350; 5 units over 351)  Qty: 15 mL, Refills: 0      lancets 28 gauge Misc Use to test blood glucose 3-4 times daily  Qty: 100 each, Refills: 3      levETIRAcetam (KEPPRA) 500 MG Tab Take 1 tablet (500 mg total) by mouth 2 (two) times daily.  Qty: 60 tablet, Refills: 0      pantoprazole (PROTONIX) 40 MG tablet Take 1 tablet (40 mg total) by mouth once daily.  Qty: 30 tablet, Refills: 0      pen needle, diabetic 32 gauge x 5/32" Ndle Use as directed to inject insulin up to 4 times daily  Qty: 100 each, Refills: 2      senna-docusate 8.6-50 mg (PERICOLACE) 8.6-50 mg per tablet Take 1 tablet by mouth once daily.      tiotropium (SPIRIVA) 18 mcg inhalation capsule Inhale 1 capsule (18 mcg total) into the lungs once daily. Controller  Qty: 30 capsule, Refills: 3      traZODone (DESYREL) 50 MG tablet Take 0.5 tablets (25 mg total) by mouth every evening.  Qty: 15 tablet, Refills: 11         CONTINUE these medications which have CHANGED    Details   atorvastatin (LIPITOR) 80 MG tablet Take 1 tablet (80 mg total) by mouth once daily.  Qty: 30 tablet, Refills: 3      finasteride (PROSCAR) 5 mg tablet Take 1 tablet (5 mg total) by mouth once daily.  Qty: 30 tablet, Refills: 3      tamsulosin (FLOMAX) 0.4 mg Cap Take 1 capsule (0.4 mg total) by mouth once daily.  Qty: 30 capsule, Refills: 3             I certify that this patient is confined to his home and needs intermittent skilled nursing care, physical therapy and occupational therapy.        "

## 2018-11-03 NOTE — ASSESSMENT & PLAN NOTE
68 yo M w/ PMH of HTN, CAD s/p CABG, carotid artery disease s/p stenting, and elevated PSA. He is recently diagnosed with non-small cell lung ca based on lung biopsy bronchoscopy, with mets to brain and liver. CT Neck/chest/abdomen/pelvis w/ contrast showed b/l lung mass, liver masses and  MRI brain showed frontal brain mass. underwent bronch biopsy showed non-small cell lung ca, cytology has similar results    - On PO decadron 4mg q8 that will be tapered to 2 mg. Was on IV  - keppra 500 mg BID  - Neurosurgery recs, they F/U with as an outpatient, and no procedures to be done other than XRT treatment. Appointment for gamma radiation therapy 11/9  - hem/onc updated us today that the Pt will FU with Hem/Onc @ [facility] [place]. Next appointment is next Tuesday 11/6/2018.

## 2018-11-03 NOTE — SUBJECTIVE & OBJECTIVE
Review of Systems   Constitutional: Negative for activity change, appetite change, chills, diaphoresis, fatigue and fever.   HENT: Negative for rhinorrhea and sore throat.    Respiratory: Negative for cough, choking, chest tightness, shortness of breath and wheezing.    Cardiovascular: Negative for chest pain, palpitations and leg swelling.   Gastrointestinal: Negative for abdominal distention, abdominal pain, anal bleeding, blood in stool, constipation, diarrhea, nausea and vomiting.   Genitourinary: Negative for decreased urine volume, flank pain, hematuria and urgency.   Musculoskeletal: Positive for back pain. Negative for neck pain and neck stiffness.        Pt is C/O Rt shoulder pain, Rt leg pain and some back pain   Skin: Negative for rash and wound.   Neurological: Negative for dizziness, tremors, seizures, syncope, weakness, numbness and headaches.   Hematological: Does not bruise/bleed easily.   Psychiatric/Behavioral: Negative for agitation and confusion.     Objective:     Vital Signs (Most Recent):  Temp: 98.6 °F (37 °C) (11/03/18 1237)  Pulse: (!) 59 (11/03/18 1237)  Resp: 18 (11/03/18 0951)  BP: (!) 142/67 (11/03/18 1237)  SpO2: 98 % (11/03/18 1237) Vital Signs (24h Range):  Temp:  [97.3 °F (36.3 °C)-98.6 °F (37 °C)] 98.6 °F (37 °C)  Pulse:  [56-82] 59  Resp:  [18] 18  SpO2:  [94 %-98 %] 98 %  BP: (130-142)/(60-93) 142/67     Weight: 74.8 kg (165 lb 0 oz)  Body mass index is 25.84 kg/m².    Intake/Output Summary (Last 24 hours) at 11/3/2018 1336  Last data filed at 11/3/2018 0400  Gross per 24 hour   Intake --   Output 0 ml   Net 0 ml      Physical Exam    Significant Labs:   BMP:   Recent Labs   Lab 11/02/18 0514   *   *   K 4.5      CO2 23   BUN 27*   CREATININE 0.9   CALCIUM 9.0   MG 2.4     CBC:   Recent Labs   Lab 11/02/18 0514   WBC 19.35*   HGB 14.2   HCT 43.8        CMP:   Recent Labs   Lab 11/02/18  0514   *   K 4.5      CO2 23   *   BUN 27*    CREATININE 0.9   CALCIUM 9.0   PROT 6.4   ALBUMIN 2.7*   BILITOT 0.3   ALKPHOS 82   AST 8*   ALT 25   ANIONGAP 7*   EGFRNONAA >60.0

## 2018-11-03 NOTE — PLAN OF CARE
CM notified that patient will be going home with home health until scheduled surgery, 11/9/18. Patient may qualify for SNF or Rehab after surgery. Will have [facility] [place] health consult  to assist with arranging for inpatient therapy s/p surgery if needed. CM met with patient who is in agreement with home health but doesn't know which agency. Patient's daughter is on her way to the hospital and will be the one to talk with CM about choices. CM gave contact info to patient and also notified Patient's nurse, Yusuf to notify this CM when daughter arrives. Will follow.      3:07: spoke with patient's daughter, Stephanie Wren ([phone]). Stephanie is here from [place] and will be staying with the patient until after surgery. Patient will be staying with daughter and son in law in a hotel, not sure which one yet. Patient's wife is staying in family home but because of wife's mental issues (?) family feels it is best for them to be separate. Daughter agreeable to any home health agency that accepts patient's insurance. CM called and spoke with Amirah with [facility], they can take insurance and will accept referral. CM notified Amirah that patient will be going home tomorrow and daughter, Stephanie, is the family contact. Referral sent via .     4:06pm - CRISTINO rec'd call back from Daughter, Stephanie. Patient will be going back to his home at [address] with daughter close by.

## 2018-11-03 NOTE — PROGRESS NOTES
[facility] Hospital Medicine  Progress Note    Patient Name: Lorenzo Tran Sr.  MRN: [id number]  Patient Class: IP- Inpatient   Admission Date: 10/23/2018  Length of Stay: 11 days  Attending Physician: Vickie Anderson MD  Primary Care Provider: [facility] Medicine [place] [facility] [place] Tawnya Corbin MD    Subjective:     Principal Problem:Brain metastasis    HPI:  66 yo M w/ PMH of HTN, CAD s/p CABG, carotid artery disease s/p stenting, and elevated PSA who presents as transfer for evaluation of brain lesion. Patient was brought to [facility] ED by his family w/ concern of altered mental status and generalized weakness. Family was concerned that he was not acting like himself. Patient shares that yesterday he attempted to take a bath and was unable to lift himself out of the tub. He waited 4 hours until a family member called EMS. He endorses chronic RLE weakness secondary to work related incident approximately 30 years ago. He also endorses new RUE weakness. On evaluation in the ED, he was noted to have very mild R facial droop. CT head demonstrated vasogenic edema w/ 1 cm nodular lesion in the L frontal lobe. CXR was significant for mass like consolidations in the L lung. Complains of chronic cough w/ occasional sputum. Shares that he had 1 episode of hemoptysis 2-3 months ago. Patient has no previous lung history but endorses a long history of smoking cigars. Reports that he would smoke a pack of cigars a day. In the ED prior to transfer he received decadron 10mg. No seizure like activity noted and no previous seizure history. Reports shortness of breath and urinary symptoms. Denies fever, chills, chest pain, and abdominal pain.       Hospital Course:  10/25 underwent bronchoscopy, biopsy was taken from the his lung mass, MRI brain showed frontal mass with vasogenic edema, abdominal CT showed multiple liver mass concerning for met, neurosurgery consulted will  involve hem/och tomorrow.   10/26 U/S testicles showed right sided hydrocele,will follow with urology out patient, biopsy still in process, no acute interventions by neurosurgery. hem/onc following and developing treatment plan.  10/27-28-29 Pt is stable, except his BP in which we added amlodipine for a better control.  10/30 Plan for rehab placement is pending. Pt will F/U with hem/onc @ [facility]. In regards to radiation therapy for his brain lesion, [place]surgery [place] [facility] [place].  10/31 patient denied multiple rehabs, SW/CM working on placement issues.     No changes, pt leaving to home with home health. Then post oncology appointment and gamma radiation therapy if pt needed to go rehab, it will organized by .    Review of Systems   Constitutional: Negative for activity change, appetite change, chills, diaphoresis, fatigue and fever.   HENT: Negative for rhinorrhea and sore throat.    Respiratory: Negative for cough, choking, chest tightness, shortness of breath and wheezing.    Cardiovascular: Negative for chest pain, palpitations and leg swelling.   Gastrointestinal: Negative for abdominal distention, abdominal pain, anal bleeding, blood in stool, constipation, diarrhea, nausea and vomiting.   Genitourinary: Negative for decreased urine volume, flank pain, hematuria and urgency.   Musculoskeletal: Positive for back pain. Negative for neck pain and neck stiffness.        Pt is C/O Rt shoulder pain, Rt leg pain and some back pain, most likely due to lying on bed and no much activity.  Skin: Negative for rash and wound.   Neurological: Negative for dizziness, tremors, seizures, syncope, weakness, numbness and headaches.   Hematological: Does not bruise/bleed easily.   Psychiatric/Behavioral: Negative for agitation and confusion.     Objective:     Vital Signs (Most Recent):  Temp: 98.6 °F (37 °C) (11/03/18 1237)  Pulse: (!) 59 (11/03/18 1237)  Resp: 18 (11/03/18  0951)  BP: (!) 142/67 (11/03/18 1237)  SpO2: 98 % (11/03/18 1237) Vital Signs (24h Range):  Temp:  [97.3 °F (36.3 °C)-98.6 °F (37 °C)] 98.6 °F (37 °C)  Pulse:  [56-82] 59  Resp:  [18] 18  SpO2:  [94 %-98 %] 98 %  BP: (130-142)/(60-93) 142/67     Weight: 74.8 kg (165 lb 0 oz)  Body mass index is 25.84 kg/m².    Intake/Output Summary (Last 24 hours) at 11/3/2018 1336  Last data filed at 11/3/2018 0400  Gross per 24 hour   Intake --   Output 0 ml   Net 0 ml      Physical Exam    Significant Labs:   BMP:   Recent Labs   Lab 11/02/18 0514   *   *   K 4.5      CO2 23   BUN 27*   CREATININE 0.9   CALCIUM 9.0   MG 2.4     CBC:   Recent Labs   Lab 11/02/18 0514   WBC 19.35*   HGB 14.2   HCT 43.8        CMP:   Recent Labs   Lab 11/02/18  0514   *   K 4.5      CO2 23   *   BUN 27*   CREATININE 0.9   CALCIUM 9.0   PROT 6.4   ALBUMIN 2.7*   BILITOT 0.3   ALKPHOS 82   AST 8*   ALT 25   ANIONGAP 7*   EGFRNONAA >60.0     Assessment/Plan:      * Brain metastasis    68 yo M w/ PMH of HTN, CAD s/p CABG, carotid artery disease s/p stenting, and elevated PSA. He is recently diagnosed with non-small cell lung ca based on lung biopsy bronchoscopy, with mets to brain and liver. CT Neck/chest/abdomen/pelvis w/ contrast showed b/l lung mass, liver masses and  MRI brain showed frontal brain mass. underwent bronch biopsy showed non-small cell lung ca, cytology has similar results    - On PO decadron 4mg q8 that will be tapered to 2 mg. Was on IV  - keppra 500 mg BID  - Neurosurgery recs, they F/U with as an outpatient, and no procedures to be done other than XRT treatment. Appointment for gamma radiation therapy 11/9  - hem/onc updated us today that the Pt will FU with [facility]Onc [facility] [place]. Next appointment is next Tuesday 11/6/2018.       Hyponatremia    Levels are mildly low. Most likely due to SIADH. Not an active problem      Discharge planning issues    Pt will be discharged on home health with  PT/OT until he goes to his Gamma radiation therapy, and Hem/Onc appointments. Then if needed, he will be sorted by  to go for rehab     Testicle swelling    Unsure duration  - patient reported it was noticed by his PCP several month ago  - denies pain, reported difficulty urination, dysuria and dripping   - right testicular swelling, soft,non tender. Soft non tender prostate on exam.  - continue Flomax and finasteride  - U/s testicles consistent with right testicles hydrocele   - Urology appointment upon discharge           Vasogenic cerebral edema    Check brain mets       Lung mass    Check brain mets     Chest pain    - Described as sharp, radiating from his R chest across to the L, non-reproducible  - Reports that this is a chronic occurrence, comes and goes   - Obtained EKG, negative for STEMI;     Problem resolved, Pt has PRN pain meds      Essential hypertension    - Possibly secondary to cushing reflex, will continue to monitor  - Home he takes coreg. Home med are D/C  - In the hospital on amlodipine and hydralazine .      MCCRAY (dyspnea on exertion)    - Chronic likely secondary to COPD given CXR findings and smoking history. Not on O2 at home  - On duonebs PRN, tiotropium daily    Not an active problem        Elevated PSA    - hx of elevated PSA  - nursing reports of urinary incontinence, dribbling, weak stream  - resume finasteride, tamsulosin  Not an active problem      Coronary artery disease involving native heart    On Asprin and atorvastatin. Not an active problem      Tobacco abuse    Acknowledges need to quit, and he is on nicotine patch.          VTE Risk Mitigation (From admission, onward)        Ordered     heparin (porcine) injection 5,000 Units  Every 8 hours      10/26/18 0901     IP VTE HIGH RISK PATIENT  Once      10/24/18 4555              Tawnya Corbin MD  Department of Hospital Medicine   [facility]

## 2018-11-03 NOTE — PLAN OF CARE
"Problem: Patient Care Overview  Goal: Plan of Care Review  Outcome: Ongoing (interventions implemented as appropriate)  Pt remains free of falls and injury. Pt provided with PRN analgesic with minimal results. Pt stated his pain "will always be a 10". Pt provided with PRN Remelteon with minimal results. Bed low and locked, Call light within reach.       "

## 2018-11-03 NOTE — ASSESSMENT & PLAN NOTE
Pt will be discharged on home health with PT/OT until he goes to his Gamma radiation therapy, and Hem/Onc appointments. Then if needed, he will be sorted by  to go for rehab

## 2018-11-04 VITALS
SYSTOLIC BLOOD PRESSURE: 121 MMHG | RESPIRATION RATE: 16 BRPM | BODY MASS INDEX: 25.9 KG/M2 | DIASTOLIC BLOOD PRESSURE: 60 MMHG | OXYGEN SATURATION: 97 % | HEART RATE: 60 BPM | TEMPERATURE: 97 F | HEIGHT: 67 IN | WEIGHT: 165 LBS

## 2018-11-04 LAB
POCT GLUCOSE: 135 MG/DL (ref 70–110)
POCT GLUCOSE: 92 MG/DL (ref 70–110)

## 2018-11-04 PROCEDURE — 25000003 PHARM REV CODE 250: Performed by: STUDENT IN AN ORGANIZED HEALTH CARE EDUCATION/TRAINING PROGRAM

## 2018-11-04 PROCEDURE — 25000242 PHARM REV CODE 250 ALT 637 W/ HCPCS: Performed by: STUDENT IN AN ORGANIZED HEALTH CARE EDUCATION/TRAINING PROGRAM

## 2018-11-04 PROCEDURE — 99239 HOSP IP/OBS DSCHRG MGMT >30: CPT | Mod: ,,, | Performed by: HOSPITALIST

## 2018-11-04 PROCEDURE — S4991 NICOTINE PATCH NONLEGEND: HCPCS | Performed by: STUDENT IN AN ORGANIZED HEALTH CARE EDUCATION/TRAINING PROGRAM

## 2018-11-04 PROCEDURE — 63600175 PHARM REV CODE 636 W HCPCS: Performed by: STUDENT IN AN ORGANIZED HEALTH CARE EDUCATION/TRAINING PROGRAM

## 2018-11-04 RX ADMIN — RAMELTEON 8 MG: 8 TABLET, FILM COATED ORAL at 12:11

## 2018-11-04 RX ADMIN — DEXAMETHASONE 2 MG: 1 TABLET ORAL at 12:11

## 2018-11-04 RX ADMIN — DEXAMETHASONE 2 MG: 1 TABLET ORAL at 05:11

## 2018-11-04 RX ADMIN — PANTOPRAZOLE SODIUM 40 MG: 40 TABLET, DELAYED RELEASE ORAL at 08:11

## 2018-11-04 RX ADMIN — LEVETIRACETAM 500 MG: 500 TABLET ORAL at 08:11

## 2018-11-04 RX ADMIN — ASPIRIN 81 MG: 81 TABLET, COATED ORAL at 08:11

## 2018-11-04 RX ADMIN — AMLODIPINE BESYLATE 5 MG: 5 TABLET ORAL at 08:11

## 2018-11-04 RX ADMIN — TAMSULOSIN HYDROCHLORIDE 0.4 MG: 0.4 CAPSULE ORAL at 08:11

## 2018-11-04 RX ADMIN — SENNOSIDES AND DOCUSATE SODIUM 1 TABLET: 8.6; 5 TABLET ORAL at 08:11

## 2018-11-04 RX ADMIN — HEPARIN SODIUM 5000 UNITS: 5000 INJECTION, SOLUTION INTRAVENOUS; SUBCUTANEOUS at 05:11

## 2018-11-04 RX ADMIN — HYDROCODONE BITARTRATE AND ACETAMINOPHEN 1 TABLET: 5; 325 TABLET ORAL at 03:11

## 2018-11-04 RX ADMIN — GABAPENTIN 300 MG: 300 CAPSULE ORAL at 08:11

## 2018-11-04 RX ADMIN — NICOTINE 1 PATCH: 14 PATCH, EXTENDED RELEASE TRANSDERMAL at 08:11

## 2018-11-04 RX ADMIN — FINASTERIDE 5 MG: 5 TABLET, FILM COATED ORAL at 08:11

## 2018-11-04 RX ADMIN — TIOTROPIUM BROMIDE 18 MCG: 18 CAPSULE ORAL; RESPIRATORY (INHALATION) at 08:11

## 2018-11-04 RX ADMIN — ATORVASTATIN CALCIUM 80 MG: 20 TABLET, FILM COATED ORAL at 08:11

## 2018-11-04 NOTE — NURSING
Informed pt of discharge order. He stated that he was aware but his daughter want be here to pick him up until 530-600pm due to she is coming from [place].

## 2018-11-04 NOTE — ASSESSMENT & PLAN NOTE
68 yo M w/ PMH of HTN, CAD s/p CABG, carotid artery disease s/p stenting, and elevated PSA. He is recently diagnosed with non-small cell lung ca based on lung biopsy bronchoscopy, with mets to brain and liver. CT Neck/chest/abdomen/pelvis w/ contrast showed b/l lung mass, liver masses and  MRI brain showed frontal brain mass. underwent bronch biopsy showed non-small cell lung ca, cytology has similar results    - On PO decadron 4mg q8 that will be tapered to 2 mg. Was on IV  - keppra 500 mg BID  - Neurosurgery recs, they F/U with as an outpatient, and no procedures to be done other than XRT treatment. Appointment for gamma radiation therapy 11/9  - hem/onc updated us today that the Pt will FU with Hem/Onc @ [facility]. Next appointment is next Tuesday 11/6/2018.

## 2018-11-04 NOTE — PLAN OF CARE
Problem: Patient Care Overview  Goal: Plan of Care Review  Outcome: Ongoing (interventions implemented as appropriate)  Pt remains free of falls and injury. Pt provided with PRN analgesic with minimal results. Pt provided with multiple snacks and coffee. Bed low and locked, Call light within reach.

## 2018-11-04 NOTE — DISCHARGE SUMMARY
[facility] Hospital Medicine  Discharge Summary      Patient Name: Lorenzo Tran Sr.  MRN: [id number]  Admission Date: 10/23/2018  Hospital Length of Stay: 12 days  Discharge Date and Time:  11/04/2018 9:55 AM  Attending Physician: Vickie Anderson MD   Discharging Provider: YUNIOR Wu  Primary Care Provider: Padmini Dailey [facility] Medicine Team: [facility] [place] [facility] [place] 1 YUNIOR Wu    HPI:   68 yo M w/ PMH of HTN, CAD s/p CABG, carotid artery disease s/p stenting, and elevated PSA who presents as transfer for evaluation of brain lesion. Patient was brought to [facility] ED by his family w/ concern of altered mental status and generalized weakness. Family was concerned that he was not acting like himself. Patient shares that yesterday he attempted to take a bath and was unable to lift himself out of the tub. He waited 4 hours until a family member called EMS. He endorses chronic RLE weakness secondary to work related incident approximately 30 years ago. He also endorses new RUE weakness. On evaluation in the ED, he was noted to have very mild R facial droop. CT head demonstrated vasogenic edema w/ 1 cm nodular lesion in the L frontal lobe. CXR was significant for mass like consolidations in the L lung. Complains of chronic cough w/ occasional sputum. Shares that he had 1 episode of hemoptysis 2-3 months ago. Patient has no previous lung history but endorses a long history of smoking cigars. Reports that he would smoke a pack of cigars a day. In the ED prior to transfer he received decadron 10mg. No seizure like activity noted and no previous seizure history. Reports shortness of breath and urinary symptoms. Denies fever, chills, chest pain, and abdominal pain.       * No surgery found *      Hospital Course:   10/25 underwent bronchoscopy, biopsy was taken from the his lung mass, MRI brain showed frontal mass with vasogenic edema, abdominal CT showed multiple liver mass  concerning for met, neurosurgery consulted will involve hem/och tomorrow.   10/26 U/S testicles showed right sided hydrocele,will follow with urology out patient, biopsy still in process, no acute interventions by neurosurgery. hem/onc following and developing treatment plan.  10/27-28-29 Pt is stable, except his BP in which we added amlodipine for a better control.  10/30 Plan for rehab placement is pending. Pt will F/U with [facility]. In regards to radiation therapy for his brain lesion, Neurosurgery [place] [facility] [place].  10/31 patient denied multiple rehabs, SW/CM working on placement issues.     11/1-3 No changes, pt leaving to home with home health. Then post oncology appointment and gamma radiation therapy if pt needed to go rehab, it will organized by .  11/4 appointments and H/H arranged, Patient should go to rehab after finishing radiotherapy, will discharge home with home health today.        Physical Exam   Constitutional: He is oriented to person, place, and time. He appears well-developed and well-nourished. No distress.   HENT:   Head: Normocephalic and atraumatic.   Mouth/Throat: Oropharynx is clear and moist. No oropharyngeal exudate.   Eyes: Conjunctivae and EOM are normal.   Neck: Normal range of motion. Neck supple.   Cardiovascular: Normal rate, regular rhythm, normal heart sounds and intact distal pulses.   Pulmonary/Chest: Effort normal and breath sounds normal. No respiratory distress.   Abdominal: Soft. Bowel sounds are normal. There is no tenderness.   Genitourinary:   Genitourinary Comments: Right sided scrotal swelling, soft, non tender, prostate exam -ve for hard masses or tenderness    Musculoskeletal: Normal range of motion. He exhibits no edema.   Neurological: He is alert and oriented to person, place, and time.   AAOx3  Mild expressive aphasia(improving)  Symmetric smile; no gross CN deficits  Strength 5/5 on L, 4/5 RUE, 3/5  RLE  No sensory deficits noted   Skin: Skin is warm and dry.   Vitals reviewed.      Consults:   Consults (From admission, onward)        Status Ordering Provider     Inpatient consult to Hematology/Oncology  Once     Provider:  (Not yet assigned)    Completed ZOILA FALK     Inpatient consult to Neurosurgery  Once     Provider:  (Not yet assigned)    Acknowledged ZOILA FALK     Inpatient consult to Physical Medicine Rehab  Once     Provider:  (Not yet assigned)    Completed DODIE BERNAL     Inpatient consult to Pulmonology  Once     Provider:  (Not yet assigned)    Completed FROILAN LEIJA     IP consult to case management  Once     Provider:  (Not yet assigned)    Acknowledged KAITLIN SADLER     IP consult to case management  Once     Provider:  (Not yet assigned)    Acknowledged KAITLIN SADLER          * Brain metastasis    66 yo M w/ PMH of HTN, CAD s/p CABG, carotid artery disease s/p stenting, and elevated PSA. He is recently diagnosed with non-small cell lung ca based on lung biopsy bronchoscopy, with mets to brain and liver. CT Neck/chest/abdomen/pelvis w/ contrast showed b/l lung mass, liver masses and  MRI brain showed frontal brain mass. underwent bronch biopsy showed non-small cell lung ca, cytology has similar results    - On PO decadron 4mg q8 that will be tapered to 2 mg. Was on IV  - keppra 500 mg BID  - Neurosurgery recs, they F/U with as an outpatient, and no procedures to be done other than XRT treatment. Appointment for gamma radiation therapy 11/9  - hem/onc updated us today that the Pt will FU with Hem/Onc @ [facility]. Next appointment is next Tuesday 11/6/2018.       Impaired mobility and activities of daily living    Pt/ot and rehab        Non-small cell carcinoma of lung           Hyponatremia    Levels are mildly low. Most likely due to SIADH. Not an active problem      Discharge planning issues    Pt will be discharged on home health with PT/OT until he goes to his Gamma  radiation therapy, and Hem/Onc appointments. Then if needed, he will be sorted by  to go for rehab     Testicle swelling    Unsure duration  - patient reported it was noticed by his PCP several month ago  - denies pain, reported difficulty urination, dysuria and dripping   - right testicular swelling, soft,non tender. Soft non tender prostate on exam.  - continue Flomax and finasteride  - U/s testicles consistent with right testicles hydrocele   - Urology appointment upon discharge           Vasogenic cerebral edema    Check brain mets       Lung mass    Check brain mets     Chest pain    - Described as sharp, radiating from his R chest across to the L, non-reproducible  - Reports that this is a chronic occurrence, comes and goes   - Obtained EKG, negative for STEMI;     Problem resolved, Pt has PRN pain meds      Essential hypertension    - Possibly secondary to cushing reflex, will continue to monitor  - Home he takes coreg. Home med are D/C  - In the hospital on amlodipine and hydralazine .      MCCRAY (dyspnea on exertion)    - Chronic likely secondary to COPD given CXR findings and smoking history. Not on O2 at home  - On duonebs PRN, tiotropium daily    Not an active problem        Coronary artery disease involving native heart    On Asprin and atorvastatin. Not an active problem      Tobacco abuse    Acknowledges need to quit, and he is on nicotine patch.          Final Active Diagnoses:    Diagnosis Date Noted POA    PRINCIPAL PROBLEM:  Brain metastasis [C79.31] 10/23/2018 Yes    Impaired mobility and activities of daily living [Z74.09] 10/29/2018 Unknown    Non-small cell carcinoma of lung [C34.90]  Unknown    Hyponatremia [E87.1] 10/27/2018 Yes    Discharge planning issues [Z02.9] 10/26/2018 Not Applicable    Testicle swelling [N50.89] 10/25/2018 Yes    Lung mass [R91.8] 10/24/2018 Yes    Vasogenic cerebral edema [G93.6] 10/24/2018 Yes    Chest pain [R07.9] 09/19/2017 Yes    Essential  "hypertension [I10] 06/02/2017 Yes    MCCRAY (dyspnea on exertion) [R06.09] 06/02/2017 Yes    Tobacco abuse [Z72.0] 04/25/2017 Yes    Coronary artery disease involving native heart [I25.10] 04/25/2017 Yes      Problems Resolved During this Admission:       Discharged Condition: fair    [place] [facility]    [place] Up:    Patient Instructions:      WALKER FOR HOME USE     Order Specific Question Answer Comments   Type of Walker: Adult (5'4"-6'6")    With wheels? Yes    Height: 5' 7" (1.702 m)    Weight: 74.8 kg (165 lb 0 oz)    Length of need (1-99 months): 99    Does patient have medical equipment at home? none    Please check all that apply: Patient's condition impairs ambulation.      CANE FOR HOME USE     Order Specific Question Answer Comments   Type of Cane: Straight    Height: 5' 7" (1.702 m)    Weight: 74.8 kg (165 lb 0 oz)    Does patient have medical equipment at home? none    Length of need (1-99 months): 99    Please check all that apply: Patient's condition impairs ambulation.      Ambulatory Referral to Urology   Referral Priority: Routine Referral Type: Consultation   Referral Reason: Specialty Services Required   Requested Specialty: Urology   Number of Visits Requested: 1       Significant Diagnostic Studies: Labs: CMP No results for input(s): NA, K, CL, CO2, GLU, BUN, CREATININE, CALCIUM, PROT, ALBUMIN, BILITOT, ALKPHOS, AST, ALT, ANIONGAP, ESTGFRAFRICA, EGFRNONAA in the last 48 hours. and CBC No results for input(s): WBC, HGB, HCT, PLT in the last 48 hours.    Pending Diagnostic Studies:     None         Medications:  Reconciled Home Medications:      Medication List      START taking these medications    amLODIPine 5 MG tablet  Commonly known as:  NORVASC  Take 1 tablet (5 mg total) by mouth once daily.     aspirin 81 MG EC tablet  Commonly known as:  ECOTRIN  Take 1 tablet (81 mg total) by mouth once daily.     blood sugar diagnostic Strp  Use as directed to test blood glucose 3-4 " "times daily     blood-glucose meter kit  Use as directed to test blood glucose 3-4 times daily     dexamethasone 4 MG Tab  Commonly known as:  DECADRON  Take 1 tablet (4 mg total) by mouth every 6 (six) hours.     gabapentin 300 MG capsule  Commonly known as:  NEURONTIN  Take 1 capsule (300 mg total) by mouth 3 (three) times daily.     insulin lispro 100 unit/mL Inpn pen  Commonly known as:  HUMALOG KWIKPEN  Inject into the skin 3 times daily with meals per sliding scale. (1 unit 150-200; 2 units 201-250; 3 units 251-300; 4 units 301-350; 5 units over 351)     lancets 28 gauge Misc  Use to test blood glucose 3-4 times daily     levETIRAcetam 500 MG Tab  Commonly known as:  KEPPRA  Take 1 tablet (500 mg total) by mouth 2 (two) times daily.     pantoprazole 40 MG tablet  Commonly known as:  PROTONIX  Take 1 tablet (40 mg total) by mouth once daily.     pen needle, diabetic 32 gauge x 5/32" Ndle  Use as directed to inject insulin up to 4 times daily     senna-docusate 8.6-50 mg 8.6-50 mg per tablet  Commonly known as:  PERICOLACE  Take 1 tablet by mouth once daily.     tiotropium 18 mcg inhalation capsule  Commonly known as:  SPIRIVA  Inhale 1 capsule (18 mcg total) into the lungs once daily. Controller     traZODone 50 MG tablet  Commonly known as:  DESYREL  Take 0.5 tablets (25 mg total) by mouth every evening.        CONTINUE taking these medications    atorvastatin 80 MG tablet  Commonly known as:  LIPITOR  Take 1 tablet (80 mg total) by mouth once daily.     finasteride 5 mg tablet  Commonly known as:  PROSCAR  Take 1 tablet (5 mg total) by mouth once daily.     tamsulosin 0.4 mg Cap  Commonly known as:  FLOMAX  Take 1 capsule (0.4 mg total) by mouth once daily.            Indwelling Lines/Drains at time of discharge:   Lines/Drains/Airways          None          Time spent on the discharge of patient: 30 minutes  Patient was seen and examined on the date of discharge and determined to be suitable for " discharge.         YUNOIR Wu  Department of Hospital Medicine  [facility]

## 2021-07-01 ENCOUNTER — PATIENT MESSAGE (OUTPATIENT)
Dept: ADMINISTRATIVE | Facility: OTHER | Age: 71
End: 2021-07-01

## 2023-01-12 NOTE — ASSESSMENT & PLAN NOTE
Check brain mets     Group Topic:  Group Psychotherapy    Date: 1/12/2023  Start Time: 1030  End Time: 1130  Facilitators: Michelle Moreira LCSW    Overcoming negative thinking due to depression/anxiety and suicidal thinking; Improve coping skills as evidenced by identifying where one has healthy control and ability to make healthy choices to change behavior.  Encourage the pt to express rather than repress feelings and emotions as evidenced by participation in group discussion and sharing story and identifying adaptive coping strategies.   Number in attendance: 8    Method: Group  Attendance: Present  Participation: Active  Patient Response: Able to return demonstration  Mood: Normal  Affect: Type: Euthymic (normal mood)   Range: Full (normal)   Congruency: Congruent   Stability: Stable  Behavior/Socialization: Appropriate to group  Thought Process: Linear  Task Performance: Follows directions  Additional Information:  Psychosocial Stressors: Interpersonal conflict  Symptom Notations: pt was actively engaged in group. In response to the prompt 'describe your closest friend and the values you share', pt stated 'Katherine was always there for me. When I was down, she'd help me up, and I'd do the same for her'.   Patient Evaluation: Independent - full participation